# Patient Record
Sex: FEMALE | Race: WHITE | NOT HISPANIC OR LATINO | Employment: OTHER | URBAN - METROPOLITAN AREA
[De-identification: names, ages, dates, MRNs, and addresses within clinical notes are randomized per-mention and may not be internally consistent; named-entity substitution may affect disease eponyms.]

---

## 2017-05-04 ENCOUNTER — TELEPHONE (OUTPATIENT)
Dept: OBSTETRICS AND GYNECOLOGY | Facility: CLINIC | Age: 62
End: 2017-05-04

## 2019-02-20 ENCOUNTER — TRANSCRIBE ORDERS (OUTPATIENT)
Dept: ADMINISTRATIVE | Facility: HOSPITAL | Age: 64
End: 2019-02-20

## 2019-02-20 DIAGNOSIS — Z12.39 BREAST SCREENING: Primary | ICD-10-CM

## 2019-02-25 ENCOUNTER — HOSPITAL ENCOUNTER (OUTPATIENT)
Dept: MAMMOGRAPHY | Facility: HOSPITAL | Age: 64
Discharge: HOME | End: 2019-02-25
Admitting: NURSE PRACTITIONER

## 2019-02-25 DIAGNOSIS — Z12.39 BREAST SCREENING: ICD-10-CM

## 2019-02-25 PROCEDURE — 77067 SCR MAMMO BI INCL CAD: CPT

## 2019-08-22 ENCOUNTER — TRANSCRIBE ORDERS (OUTPATIENT)
Dept: ADMINISTRATIVE | Facility: HOSPITAL | Age: 64
End: 2019-08-22

## 2019-08-22 DIAGNOSIS — R13.10 DYSPHAGIA, UNSPECIFIED TYPE: Primary | ICD-10-CM

## 2019-09-05 ENCOUNTER — HOSPITAL ENCOUNTER (OUTPATIENT)
Dept: GENERAL RADIOLOGY | Facility: HOSPITAL | Age: 64
Discharge: HOME | End: 2019-09-05

## 2019-09-05 ENCOUNTER — HOSPITAL ENCOUNTER (OUTPATIENT)
Dept: GENERAL RADIOLOGY | Facility: HOSPITAL | Age: 64
Discharge: HOME | End: 2019-09-05
Admitting: OTOLARYNGOLOGY

## 2019-09-05 DIAGNOSIS — R13.10 DYSPHAGIA, UNSPECIFIED TYPE: ICD-10-CM

## 2019-09-05 PROCEDURE — 74220 X-RAY XM ESOPHAGUS 1CNTRST: CPT

## 2019-09-05 PROCEDURE — 92611 MOTION FLUOROSCOPY/SWALLOW: CPT

## 2019-09-05 PROCEDURE — 74230 X-RAY XM SWLNG FUNCJ C+: CPT

## 2019-09-05 RX ADMIN — BARIUM SULFATE 50 ML: 400 SUSPENSION ORAL at 10:14

## 2019-09-05 RX ADMIN — BARIUM SULFATE 183 ML: 960 POWDER, FOR SUSPENSION ORAL at 10:13

## 2019-09-05 RX ADMIN — BARIUM SULFATE 135 ML: 980 POWDER, FOR SUSPENSION ORAL at 09:11

## 2019-09-05 RX ADMIN — BARIUM SULFATE 135 ML: 980 POWDER, FOR SUSPENSION ORAL at 10:13

## 2019-09-05 RX ADMIN — ANTACID/ANTIFLATULENT 1 TABLET: 380; 550; 10; 10 GRANULE, EFFERVESCENT ORAL at 09:11

## 2019-09-05 RX ADMIN — BARIUM SULFATE 183 ML: 960 POWDER, FOR SUSPENSION ORAL at 09:11
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
## 2019-10-08 ENCOUNTER — OFFICE VISIT (OUTPATIENT)
Dept: OBSTETRICS AND GYNECOLOGY | Facility: CLINIC | Age: 64
End: 2019-10-08

## 2019-10-08 VITALS
SYSTOLIC BLOOD PRESSURE: 116 MMHG | WEIGHT: 129 LBS | HEIGHT: 59 IN | BODY MASS INDEX: 26 KG/M2 | DIASTOLIC BLOOD PRESSURE: 62 MMHG

## 2019-10-08 DIAGNOSIS — Z00.00 ANNUAL PHYSICAL EXAM: Primary | ICD-10-CM

## 2019-10-08 DIAGNOSIS — N95.2 ATROPHIC VAGINITIS: ICD-10-CM

## 2019-10-08 DIAGNOSIS — N95.1 MENOPAUSE SYNDROME: ICD-10-CM

## 2019-10-08 PROCEDURE — 99386 PREV VISIT NEW AGE 40-64: CPT | Performed by: OBSTETRICS & GYNECOLOGY

## 2019-10-08 PROCEDURE — 99213 OFFICE O/P EST LOW 20 MIN: CPT | Performed by: OBSTETRICS & GYNECOLOGY

## 2019-10-08 RX ORDER — VALSARTAN 40 MG/1
40 TABLET ORAL DAILY
COMMUNITY
Start: 2019-09-23 | End: 2021-07-15

## 2019-10-08 RX ORDER — RIZATRIPTAN BENZOATE 10 MG/1
10 TABLET ORAL ONCE AS NEEDED
COMMUNITY
End: 2021-07-15

## 2019-10-08 RX ORDER — OMEPRAZOLE 20 MG/1
20 CAPSULE, DELAYED RELEASE ORAL DAILY
COMMUNITY
Start: 2019-09-23 | End: 2021-07-15

## 2019-10-08 RX ORDER — HYDROCHLOROTHIAZIDE 12.5 MG/1
12.5 TABLET ORAL DAILY
COMMUNITY
Start: 2019-07-19 | End: 2020-07-18

## 2019-10-08 RX ORDER — HYDROCODONE BITARTRATE AND ACETAMINOPHEN 5; 325 MG/1; MG/1
1 TABLET ORAL EVERY 6 HOURS PRN
COMMUNITY
End: 2021-05-06 | Stop reason: SDUPTHER

## 2019-10-08 NOTE — PROGRESS NOTES
HPI   Domitila Paiz  is a 63 y.o. female who presents for several reasons.  First, it is been many years since she had a gynecologic exam.  She would like to reestablish care.  She reports normal mammogram earlier this year.  Colonoscopy is planned for November.  Second, she has hot flashes and night sweats.  She used to take Estrace pills.  She stopped them approximately 11 months ago.  Since then, her hot flashes and night sweats have returned.  Third, she has dyspareunia due to vaginal dryness.  She reports pain and dryness with difficulty on penetration.  This has not responded to the use of a personal lubricant.    Chief Complaint   Patient presents with   • New Gyn     Patient is here for a new gyn annual.       Past Medical History:   Diagnosis Date   • Hypertension        Past Surgical History:   Procedure Laterality Date   • HYSTERECTOMY         Social History     Socioeconomic History   • Marital status:      Spouse name: Not on file   • Number of children: Not on file   • Years of education: Not on file   • Highest education level: Not on file   Tobacco Use   • Smoking status: Never Smoker   Substance and Sexual Activity   • Alcohol use: No     Frequency: Never   • Drug use: No   • Sexual activity: Yes     Partners: Male       The following portions of the patient's history were reviewed and updated as appropriate: allergies, current medications, past family history, past medical history, past social history, past surgical history and problem list.    Review of Systems this is positive for hot flashes and night sweats.  It is positive for vaginal dryness.  It is positive for dyspareunia.  It is negative for vaginal bleeding.  It is negative for fever or chills.  It is negative for any spleen weight change.  It is negative for nausea or vomiting.  All other systems are reviewed and are negative          Physical Exam   Constitutional: She is oriented to person, place, and time. She appears  well-developed and well-nourished.   HENT:   Head: Normocephalic and atraumatic.   Cardiovascular: Normal rate and regular rhythm.   Pulmonary/Chest: Effort normal and breath sounds normal. She has no wheezes. She has no rales.   The breasts are homogeneous.  There are no palpable lumps.  Nipple discharge and axillary adenopathy are absent.   Abdominal: Soft. She exhibits no distension. There is no tenderness.   Genitourinary: There is no lesion on the right labia. There is no lesion on the left labia. Right adnexum displays no mass. No vaginal discharge found.   Genitourinary Comments: The vagina is atrophic but without lesion  No pelvic masses are palpable   Neurological: She is alert and oriented to person, place, and time.   Skin: Skin is warm and dry.   Nursing note and vitals reviewed.      Assessment    Domitila was seen today for new gyn.    Diagnoses and all orders for this visit:    Annual physical exam    Menopause syndrome    Atrophic vaginitis    Other orders  -     conjugated estrogens (PREMARIN) 0.625 MG/GM vaginal cream; 0.5 grams vaginally twice weekly        Plan  1. Normal gynecologic exam  2. The patient reports a recent normal mammogram.  Counseled regarding the importance of regular screening mammograms.  3. Colonoscopy is planned for next month.  4. Menopausal symptoms.  Counseled regarding options for management.  The benefits, risks and alternatives to using systemic hormone replacement were discussed.  Transdermal hormone replacement was recommended.  I answered the patient's questions and reviewed data from the [facility].  After consideration, the patient declined systemic hormone replacement  5. Dyspareunia and atrophic vaginitis.  Counseled.  We discussed the benefits and risks of using vaginal estrogen.  The patient would like to try this.  I answered her questions and discussed proper use.  A prescription for Premarin cream has been sent.  6. 15 minutes of direct face-to-face counseling were  undertaken to discuss issues #4 and 5 listed above.    7. Return in about 1 year (around 10/8/2020).    Social History     Tobacco Use   Smoking Status Never Smoker   8.     9.

## 2019-12-03 ENCOUNTER — TRANSCRIBE ORDERS (OUTPATIENT)
Dept: ADMINISTRATIVE | Facility: HOSPITAL | Age: 64
End: 2019-12-03

## 2019-12-03 DIAGNOSIS — R22.1 MASS OF RIGHT SIDE OF NECK: Primary | ICD-10-CM

## 2019-12-05 ENCOUNTER — APPOINTMENT (OUTPATIENT)
Dept: CT IMAGING | Facility: HOSPITAL | Age: 64
End: 2019-12-05

## 2020-01-06 ENCOUNTER — HOSPITAL ENCOUNTER (OUTPATIENT)
Dept: CT IMAGING | Facility: HOSPITAL | Age: 65
Discharge: HOME | End: 2020-01-06
Admitting: OTOLARYNGOLOGY

## 2020-01-06 DIAGNOSIS — R22.1 MASS OF RIGHT SIDE OF NECK: ICD-10-CM

## 2020-01-06 LAB — CREAT BLDA-MCNC: 0.8 MG/DL (ref 0.6–1.3)

## 2020-01-06 PROCEDURE — 82565 ASSAY OF CREATININE: CPT

## 2020-01-06 PROCEDURE — 25010000002 IOPAMIDOL 61 % SOLUTION: Performed by: OTOLARYNGOLOGY

## 2020-01-06 PROCEDURE — 70491 CT SOFT TISSUE NECK W/DYE: CPT

## 2020-01-06 RX ADMIN — IOPAMIDOL 75 ML: 612 INJECTION, SOLUTION INTRAVENOUS at 08:45

## 2020-02-04 ENCOUNTER — OFFICE VISIT (OUTPATIENT)
Dept: GASTROENTEROLOGY | Facility: CLINIC | Age: 65
End: 2020-02-04

## 2020-02-04 VITALS
SYSTOLIC BLOOD PRESSURE: 122 MMHG | HEIGHT: 59 IN | WEIGHT: 129.6 LBS | BODY MASS INDEX: 26.13 KG/M2 | TEMPERATURE: 98.5 F | DIASTOLIC BLOOD PRESSURE: 72 MMHG

## 2020-02-04 DIAGNOSIS — R13.10 DYSPHAGIA, UNSPECIFIED TYPE: Primary | ICD-10-CM

## 2020-02-04 DIAGNOSIS — K21.9 GASTROESOPHAGEAL REFLUX DISEASE, ESOPHAGITIS PRESENCE NOT SPECIFIED: ICD-10-CM

## 2020-02-04 PROCEDURE — 99204 OFFICE O/P NEW MOD 45 MIN: CPT | Performed by: INTERNAL MEDICINE

## 2020-02-04 RX ORDER — UBIDECARENONE 75 MG
50 CAPSULE ORAL DAILY
COMMUNITY
End: 2020-11-11

## 2020-02-04 RX ORDER — SODIUM CHLORIDE, SODIUM LACTATE, POTASSIUM CHLORIDE, CALCIUM CHLORIDE 600; 310; 30; 20 MG/100ML; MG/100ML; MG/100ML; MG/100ML
30 INJECTION, SOLUTION INTRAVENOUS CONTINUOUS
Status: CANCELLED | OUTPATIENT
Start: 2020-02-04
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
## 2020-02-24 ENCOUNTER — OUTSIDE FACILITY SERVICE (OUTPATIENT)
Dept: GASTROENTEROLOGY | Facility: CLINIC | Age: 65
End: 2020-02-24

## 2020-02-24 PROCEDURE — 43239 EGD BIOPSY SINGLE/MULTIPLE: CPT | Performed by: INTERNAL MEDICINE

## 2020-03-05 ENCOUNTER — TELEPHONE (OUTPATIENT)
Dept: GASTROENTEROLOGY | Facility: CLINIC | Age: 65
End: 2020-03-05

## 2020-03-05 NOTE — TELEPHONE ENCOUNTER
----- Message from Jarrell PENG MD sent at 3/2/2020  5:39 PM EST -----  Regarding: Biopsy results  Okay to call results, recommend continue/switch PPI for management of Santillan's esophagus.  Would offer follow-up EGD in 2 years time.  If swallowing issues persist can consider video fluoroscopy swallow study as well as esophageal manometry to better define.  ----- Message -----  From: Interface, Scans Incoming  Sent: 3/2/2020   1:46 PM EST  To: Jarrell PENG MD

## 2020-03-05 NOTE — TELEPHONE ENCOUNTER
I would explained to the patient that findings in the duodenum were nonspecific but may be associated with underlying medical conditions although she does not have any the clinical manifestations of those conditions.  I would be happy to discuss this with her at length if she wishes to follow-up and do so.

## 2020-03-20 NOTE — TELEPHONE ENCOUNTER
As per our conversation, would advise patient sleep on a wedge (on her stomach if she wishes) and also to follow antireflux measures such as not eating late and avoiding foods that potentiate reflux.  Manometry would be for testing esophageal function including lower esophageal sphincter pressure to see if that predisposes her to reflux.  I would advise the patient follow-up with myself or the nurse practitioner to discuss these issues at length if she so desires.

## 2020-03-20 NOTE — TELEPHONE ENCOUNTER
"Call to pt.  Advise per path report that mild duodenal lymphocytosis.  Stomach body with gastritis.  GE junction with evidence of Santillan's.    Advise per Dr Franco notes.      Verb understanding.   has had swallow study - see media tab 8/22/19.    States sleeps on her stomach, but if she turns onto her back \"even for just a minute I have terrible choking sensation\".  Does not have this issue if recliner.  Advise elevate HOB, or use pillows.  cannot do this because usually sleeps on stomach.   would like to pursue further testing, but wants to understand better what manometry would show.    Questions to Dr Franco.   "

## 2020-03-23 NOTE — TELEPHONE ENCOUNTER
Call to pt.  Advise per DR Franco note.  Verb understanding.  States will try reflux measures at this time - f/u in future if needed.

## 2020-11-06 ENCOUNTER — APPOINTMENT (OUTPATIENT)
Dept: CARDIOLOGY | Facility: HOSPITAL | Age: 65
End: 2020-11-06

## 2020-11-06 ENCOUNTER — HOSPITAL ENCOUNTER (EMERGENCY)
Facility: HOSPITAL | Age: 65
Discharge: HOME | End: 2020-11-06
Attending: EMERGENCY MEDICINE | Admitting: EMERGENCY MEDICINE

## 2020-11-06 ENCOUNTER — APPOINTMENT (OUTPATIENT)
Dept: GENERAL RADIOLOGY | Facility: HOSPITAL | Age: 65
End: 2020-11-06

## 2020-11-06 VITALS
HEART RATE: 74 BPM | HEIGHT: 59 IN | DIASTOLIC BLOOD PRESSURE: 85 MMHG | SYSTOLIC BLOOD PRESSURE: 139 MMHG | BODY MASS INDEX: 26.18 KG/M2 | OXYGEN SATURATION: 98 % | RESPIRATION RATE: 16 BRPM | TEMPERATURE: 98.5 F

## 2020-11-06 DIAGNOSIS — R06.02 SHORTNESS OF BREATH: ICD-10-CM

## 2020-11-06 DIAGNOSIS — M79.605 LOWER EXTREMITY PAIN, LEFT: Primary | ICD-10-CM

## 2020-11-06 LAB
ALBUMIN SERPL-MCNC: 4.3 G/DL (ref 3.5–5.2)
ALBUMIN/GLOB SERPL: 1.4 G/DL
ALP SERPL-CCNC: 118 U/L (ref 39–117)
ALT SERPL W P-5'-P-CCNC: 31 U/L (ref 1–33)
ANION GAP SERPL CALCULATED.3IONS-SCNC: 11 MMOL/L (ref 5–15)
AST SERPL-CCNC: 43 U/L (ref 1–32)
BASOPHILS # BLD AUTO: 0.07 10*3/MM3 (ref 0–0.2)
BASOPHILS NFR BLD AUTO: 0.7 % (ref 0–1.5)
BH CV LOWER VASCULAR LEFT COMMON FEMORAL AUGMENT: NORMAL
BH CV LOWER VASCULAR LEFT COMMON FEMORAL COMPETENT: NORMAL
BH CV LOWER VASCULAR LEFT COMMON FEMORAL COMPRESS: NORMAL
BH CV LOWER VASCULAR LEFT COMMON FEMORAL PHASIC: NORMAL
BH CV LOWER VASCULAR LEFT COMMON FEMORAL SPONT: NORMAL
BH CV LOWER VASCULAR LEFT DISTAL FEMORAL COMPRESS: NORMAL
BH CV LOWER VASCULAR LEFT GASTRONEMIUS COMPRESS: NORMAL
BH CV LOWER VASCULAR LEFT GREATER SAPH AK COMPRESS: NORMAL
BH CV LOWER VASCULAR LEFT GREATER SAPH BK COMPRESS: NORMAL
BH CV LOWER VASCULAR LEFT LESSER SAPH COMPRESS: NORMAL
BH CV LOWER VASCULAR LEFT MID FEMORAL AUGMENT: NORMAL
BH CV LOWER VASCULAR LEFT MID FEMORAL COMPETENT: NORMAL
BH CV LOWER VASCULAR LEFT MID FEMORAL COMPRESS: NORMAL
BH CV LOWER VASCULAR LEFT MID FEMORAL PHASIC: NORMAL
BH CV LOWER VASCULAR LEFT MID FEMORAL SPONT: NORMAL
BH CV LOWER VASCULAR LEFT PERONEAL COMPRESS: NORMAL
BH CV LOWER VASCULAR LEFT POPLITEAL AUGMENT: NORMAL
BH CV LOWER VASCULAR LEFT POPLITEAL COMPETENT: NORMAL
BH CV LOWER VASCULAR LEFT POPLITEAL COMPRESS: NORMAL
BH CV LOWER VASCULAR LEFT POPLITEAL PHASIC: NORMAL
BH CV LOWER VASCULAR LEFT POPLITEAL SPONT: NORMAL
BH CV LOWER VASCULAR LEFT POSTERIOR TIBIAL COMPRESS: NORMAL
BH CV LOWER VASCULAR LEFT PROFUNDA FEMORAL COMPRESS: NORMAL
BH CV LOWER VASCULAR LEFT PROXIMAL FEMORAL COMPRESS: NORMAL
BH CV LOWER VASCULAR LEFT SAPHENOFEMORAL JUNCTION COMPRESS: NORMAL
BH CV LOWER VASCULAR RIGHT COMMON FEMORAL AUGMENT: NORMAL
BH CV LOWER VASCULAR RIGHT COMMON FEMORAL COMPETENT: NORMAL
BH CV LOWER VASCULAR RIGHT COMMON FEMORAL COMPRESS: NORMAL
BH CV LOWER VASCULAR RIGHT COMMON FEMORAL PHASIC: NORMAL
BH CV LOWER VASCULAR RIGHT COMMON FEMORAL SPONT: NORMAL
BH CV POP FLUID COLLECT LEFT: 1
BILIRUB SERPL-MCNC: 0.2 MG/DL (ref 0–1.2)
BUN SERPL-MCNC: 14 MG/DL (ref 8–23)
BUN/CREAT SERPL: 18.7 (ref 7–25)
CALCIUM SPEC-SCNC: 9.2 MG/DL (ref 8.6–10.5)
CHLORIDE SERPL-SCNC: 101 MMOL/L (ref 98–107)
CO2 SERPL-SCNC: 25 MMOL/L (ref 22–29)
CREAT SERPL-MCNC: 0.75 MG/DL (ref 0.57–1)
D DIMER PPP FEU-MCNC: 0.66 MCGFEU/ML (ref 0–0.49)
DEPRECATED RDW RBC AUTO: 41 FL (ref 37–54)
EOSINOPHIL # BLD AUTO: 0.16 10*3/MM3 (ref 0–0.4)
EOSINOPHIL NFR BLD AUTO: 1.6 % (ref 0.3–6.2)
ERYTHROCYTE [DISTWIDTH] IN BLOOD BY AUTOMATED COUNT: 14.1 % (ref 12.3–15.4)
GFR SERPL CREATININE-BSD FRML MDRD: 78 ML/MIN/1.73
GLOBULIN UR ELPH-MCNC: 3 GM/DL
GLUCOSE SERPL-MCNC: 94 MG/DL (ref 65–99)
HCT VFR BLD AUTO: 33.7 % (ref 34–46.6)
HGB BLD-MCNC: 10.9 G/DL (ref 12–15.9)
HOLD SPECIMEN: NORMAL
HOLD SPECIMEN: NORMAL
IMM GRANULOCYTES # BLD AUTO: 0.03 10*3/MM3 (ref 0–0.05)
IMM GRANULOCYTES NFR BLD AUTO: 0.3 % (ref 0–0.5)
LYMPHOCYTES # BLD AUTO: 2.44 10*3/MM3 (ref 0.7–3.1)
LYMPHOCYTES NFR BLD AUTO: 25 % (ref 19.6–45.3)
MCH RBC QN AUTO: 26.2 PG (ref 26.6–33)
MCHC RBC AUTO-ENTMCNC: 32.3 G/DL (ref 31.5–35.7)
MCV RBC AUTO: 81 FL (ref 79–97)
MONOCYTES # BLD AUTO: 1.1 10*3/MM3 (ref 0.1–0.9)
MONOCYTES NFR BLD AUTO: 11.3 % (ref 5–12)
NEUTROPHILS NFR BLD AUTO: 5.96 10*3/MM3 (ref 1.7–7)
NEUTROPHILS NFR BLD AUTO: 61.1 % (ref 42.7–76)
NRBC BLD AUTO-RTO: 0 /100 WBC (ref 0–0.2)
PLATELET # BLD AUTO: 306 10*3/MM3 (ref 140–450)
PMV BLD AUTO: 9.6 FL (ref 6–12)
POTASSIUM SERPL-SCNC: 4.4 MMOL/L (ref 3.5–5.2)
PROT SERPL-MCNC: 7.3 G/DL (ref 6–8.5)
QT INTERVAL: 359 MS
RBC # BLD AUTO: 4.16 10*6/MM3 (ref 3.77–5.28)
SODIUM SERPL-SCNC: 137 MMOL/L (ref 136–145)
TROPONIN T SERPL-MCNC: <0.01 NG/ML (ref 0–0.03)
WBC # BLD AUTO: 9.76 10*3/MM3 (ref 3.4–10.8)
WHOLE BLOOD HOLD SPECIMEN: NORMAL
WHOLE BLOOD HOLD SPECIMEN: NORMAL

## 2020-11-06 PROCEDURE — 25010000003 MEPERIDINE PER 100 MG: Performed by: EMERGENCY MEDICINE

## 2020-11-06 PROCEDURE — 93971 EXTREMITY STUDY: CPT

## 2020-11-06 PROCEDURE — 96374 THER/PROPH/DIAG INJ IV PUSH: CPT

## 2020-11-06 PROCEDURE — 84484 ASSAY OF TROPONIN QUANT: CPT | Performed by: EMERGENCY MEDICINE

## 2020-11-06 PROCEDURE — 36415 COLL VENOUS BLD VENIPUNCTURE: CPT

## 2020-11-06 PROCEDURE — 85025 COMPLETE CBC W/AUTO DIFF WBC: CPT | Performed by: EMERGENCY MEDICINE

## 2020-11-06 PROCEDURE — 93010 ELECTROCARDIOGRAM REPORT: CPT | Performed by: INTERNAL MEDICINE

## 2020-11-06 PROCEDURE — 71045 X-RAY EXAM CHEST 1 VIEW: CPT

## 2020-11-06 PROCEDURE — 93005 ELECTROCARDIOGRAM TRACING: CPT | Performed by: EMERGENCY MEDICINE

## 2020-11-06 PROCEDURE — 80053 COMPREHEN METABOLIC PANEL: CPT | Performed by: EMERGENCY MEDICINE

## 2020-11-06 PROCEDURE — 99284 EMERGENCY DEPT VISIT MOD MDM: CPT

## 2020-11-06 PROCEDURE — 85379 FIBRIN DEGRADATION QUANT: CPT | Performed by: EMERGENCY MEDICINE

## 2020-11-06 RX ORDER — MEPERIDINE HYDROCHLORIDE 25 MG/ML
50 INJECTION INTRAMUSCULAR; INTRAVENOUS; SUBCUTANEOUS ONCE
Status: COMPLETED | OUTPATIENT
Start: 2020-11-06 | End: 2020-11-06

## 2020-11-06 RX ORDER — SODIUM CHLORIDE 0.9 % (FLUSH) 0.9 %
10 SYRINGE (ML) INJECTION AS NEEDED
Status: DISCONTINUED | OUTPATIENT
Start: 2020-11-06 | End: 2020-11-06 | Stop reason: HOSPADM

## 2020-11-06 RX ORDER — ASPIRIN 81 MG/1
324 TABLET, CHEWABLE ORAL ONCE
Status: DISCONTINUED | OUTPATIENT
Start: 2020-11-06 | End: 2020-11-06 | Stop reason: HOSPADM

## 2020-11-06 RX ADMIN — MEPERIDINE HYDROCHLORIDE 50 MG: 25 INJECTION INTRAMUSCULAR; INTRAVENOUS; SUBCUTANEOUS at 13:20
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
## 2020-11-06 NOTE — DISCHARGE INSTRUCTIONS
Your Doppler ultrasound does not show evidence of blood clots.  Your lab testing does not show evidence of heart attack or other serious cardiac problems.  Chest x-ray was found to be free of pneumonia or abnormal fluid collections.  Please take Tylenol and/or Motrin to help with pain of the left lower extremity.  I would recommend minimizing movement and exertion of the left leg until your symptoms have improved.

## 2020-11-06 NOTE — ED TRIAGE NOTES
Pt arrives via EMS from [facility]. Pt had cardiac stents placed on 11/3/20 at [facility] and was discharged 2 days ago. Pt reports that last night she developed L calf pain and this morning started to experience CP and SOA. Pt has had 2 nitro and 324 mg ASA PTA with some relief. Pt requested to come to [facility] [place] instead of [facility]. Pt masked at arrival and triage staff wore all appropriate PPE.

## 2020-11-11 ENCOUNTER — OFFICE VISIT (OUTPATIENT)
Dept: CARDIOLOGY | Facility: CLINIC | Age: 65
End: 2020-11-11

## 2020-11-11 VITALS
DIASTOLIC BLOOD PRESSURE: 100 MMHG | BODY MASS INDEX: 25.48 KG/M2 | HEART RATE: 84 BPM | SYSTOLIC BLOOD PRESSURE: 162 MMHG | WEIGHT: 126.4 LBS | HEIGHT: 59 IN

## 2020-11-11 DIAGNOSIS — E78.2 MIXED HYPERLIPIDEMIA: ICD-10-CM

## 2020-11-11 DIAGNOSIS — I10 ESSENTIAL HYPERTENSION: ICD-10-CM

## 2020-11-11 DIAGNOSIS — I25.118 CORONARY ARTERY DISEASE OF NATIVE ARTERY OF NATIVE HEART WITH STABLE ANGINA PECTORIS (HCC): Primary | ICD-10-CM

## 2020-11-11 PROCEDURE — 99204 OFFICE O/P NEW MOD 45 MIN: CPT | Performed by: INTERNAL MEDICINE

## 2020-11-11 PROCEDURE — 93000 ELECTROCARDIOGRAM COMPLETE: CPT | Performed by: INTERNAL MEDICINE

## 2020-11-11 RX ORDER — DOXYCYCLINE 100 MG/1
100 TABLET ORAL AS NEEDED
COMMUNITY
Start: 2020-07-14 | End: 2021-07-15

## 2020-11-11 RX ORDER — EZETIMIBE 10 MG/1
10 TABLET ORAL DAILY
COMMUNITY
Start: 2020-10-31 | End: 2021-06-03

## 2020-11-11 RX ORDER — CARVEDILOL 6.25 MG/1
6.25 TABLET ORAL 2 TIMES DAILY WITH MEALS
Qty: 180 TABLET | Refills: 3 | Status: SHIPPED | OUTPATIENT
Start: 2020-11-11 | End: 2020-11-20 | Stop reason: SDUPTHER

## 2020-11-11 RX ORDER — ASPIRIN 81 MG/1
81 TABLET ORAL DAILY
COMMUNITY
Start: 2020-10-19 | End: 2021-04-28

## 2020-11-11 RX ORDER — NITROGLYCERIN 0.4 MG/1
0.4 TABLET SUBLINGUAL
COMMUNITY
Start: 2020-10-31

## 2020-11-11 RX ORDER — PRASUGREL 10 MG/1
10 TABLET, FILM COATED ORAL DAILY
COMMUNITY
Start: 2020-10-31 | End: 2021-04-28

## 2020-11-11 RX ORDER — ROSUVASTATIN CALCIUM 20 MG/1
20 TABLET, COATED ORAL DAILY
COMMUNITY
Start: 2020-10-19 | End: 2021-06-03

## 2020-11-11 RX ORDER — METOPROLOL SUCCINATE 25 MG/1
25 TABLET, EXTENDED RELEASE ORAL DAILY
COMMUNITY
Start: 2020-10-20 | End: 2020-11-11

## 2020-11-11 RX ORDER — ALBUTEROL SULFATE 90 UG/1
2 AEROSOL, METERED RESPIRATORY (INHALATION)
COMMUNITY
End: 2021-07-15

## 2020-11-11 RX ORDER — MULTIPLE VITAMINS W/ MINERALS TAB 9MG-400MCG
1 TAB ORAL DAILY
COMMUNITY
End: 2021-07-15

## 2020-11-11 NOTE — PROGRESS NOTES
[facility] New Patient Office Note     Encounter Date:20  Patient:Domitila Paiz  :1955  MRN:[id number]    Referring Provider: Self    Consulted for: Coronary artery disease    Chief Complaint:   Chief Complaint   Patient presents with   • Chest Pain   • Shortness of Breath     History of Presenting Illness:      Ms. Paiz is a 64 y.o. woman past medical history notable for coronary artery disease status post percutaneous intervention, hypertension, mixed hyperlipidemia, and chronic back pain related orthopedic issues status post surgeries who presents to our office for additional evaluation regarding her recent diagnosis of coronary artery disease with recent stenting.      Patient's cardiac care thus far has been at [facility] she initially underwent a cardiac CTA for further evaluation of her chest pain last month which demonstrated LAD stenosis.  She did undergo a cardiac catheterization on 10/30/2020 where she had a stent placed to the proximal LAD.  Unfortunately, she started have more chest pain after the procedure and the following day she had a repeat cardiac catheterization which demonstrated stable coronary findings.  She did have concerns for access issues from her right groin and had an ultrasound which was normal.  She continued to have chest pain and thus represented back to [facility] ED on 11/3 and was noted to have elevated troponins and continued chest pain and had a repeat cardiac catheterization where her prior moderate 50% lesion in the mid LAD had seem to progress to 75% and was reported as hazy.  She had a second stent placed at that time.      Unfortunately she continues to have chest discomfort.  She states that she continues to have significant dyspnea on exertion as well as chest pressure.  For the most part this is been actually worse since her initial presentation for an elective cardiac catheterization.  With regards to her symptoms they are constant  throughout most the day.  There is minimal exertional component to them.  She does state that it does seem worse if her blood pressure gets elevated.    With regards to her current medical regimen metoprolol is a new medicine for her was added after her recent stenting.  She is also been on dual antiplatelet therapy with aspirin and Effient.  She denies any bleeding issues while on dual antiplatelet therapy.  She has had bruising but relates this to being held down during her last cardiac catheterization.    Due to continued symptoms as well as concerns for possible leg swelling and bruising in her legs she did present to our emergency room on 11/6/2020 where she was ruled out for DVT with lower extremity Dopplers which were normal.  Given the issues that she had at [facility] she wanted to seek out a second opinion and was referred to us from the ED.      Review of Systems:  Review of Systems   Constitution: Positive for malaise/fatigue.   HENT: Negative.    Eyes: Negative.    Cardiovascular: Positive for chest pain, dyspnea on exertion and leg swelling.   Respiratory: Positive for shortness of breath.    Endocrine: Negative.    Hematologic/Lymphatic: Negative.    Skin: Negative.    Musculoskeletal: Negative.    Gastrointestinal: Negative.    Genitourinary: Negative.    Neurological: Negative.    Psychiatric/Behavioral: Negative.    Allergic/Immunologic: Negative.        Prior to Admission medications    Medication Sig Start Date End Date Taking? Authorizing Provider   albuterol sulfate  (90 Base) MCG/ACT inhaler Inhale 2 puffs.   Yes ProviderBarnden MD   aspirin 81 MG EC tablet Take 81 mg by mouth Daily. 10/19/20  Yes Branden Britton MD   doxycycline (ADOXA) 100 MG tablet Take 100 mg by mouth Daily. 7/14/20  Yes Branden Britton MD   ezetimibe (ZETIA) 10 MG tablet Take 10 mg by mouth Daily. 10/31/20  Yes ProviderBranden MD   HYDROcodone-acetaminophen (NORCO) 5-325 MG per tablet  Take 1 tablet by mouth Every 6 (Six) Hours As Needed.   Yes Branden Britton MD   multivitamin with minerals (MULTIPLE VITAMINS-MINERALS PO) Take 1 tablet by mouth Daily.   Yes Branden Britton MD   nitroglycerin (NITROSTAT) 0.4 MG SL tablet Place 0.4 mg under the tongue. 10/31/20  Yes Branden Britton MD   omeprazole (priLOSEC) 20 MG capsule Take 20 mg by mouth Daily. 9/23/19  Yes Branden Britton MD   prasugrel (EFFIENT) 10 MG tablet Take 10 mg by mouth Daily. 10/31/20  Yes Branden Britton MD   rizatriptan (MAXALT) 10 MG tablet Take 10 mg by mouth 1 (One) Time As Needed for Migraine. May repeat in 2 hours if needed   Yes Branden Britton MD   rosuvastatin (CRESTOR) 20 MG tablet Take 20 mg by mouth Daily. 10/19/20  Yes Branden Britton MD   valsartan (DIOVAN) 40 MG tablet Take 40 mg by mouth Daily. 9/23/19  Yes Branden Britton MD   metoprolol succinate XL (TOPROL-XL) 25 MG 24 hr tablet Take 25 mg by mouth Daily. 10/20/20 11/11/20 Yes Branden Britton MD   carvedilol (COREG) 6.25 MG tablet Take 1 tablet by mouth 2 (Two) Times a Day With Meals. 11/11/20   Carlos A Vasques MD   conjugated estrogens (PREMARIN) 0.625 MG/GM vaginal cream 0.5 grams vaginally twice weekly 10/8/19 11/11/20  Allan Hannon MD   vitamin B-12 (CYANOCOBALAMIN) 100 MCG tablet Take 50 mcg by mouth Daily.  11/11/20  ProviderBranden MD       Allergies   Allergen Reactions   • Wasp Venom Anaphylaxis   • Morphine Other (See Comments) and Hives     Pt states she almost od'd in the hosp on this years ago   • Poison Ivy Extract Hives   • Butorphanol Itching, Rash and Hives       Past Medical History:   Diagnosis Date   • Hypertension        Past Surgical History:   Procedure Laterality Date   • CARDIAC SURGERY     • COLONOSCOPY  2012    Dr. Lam   • HYSTERECTOMY         Social History     Socioeconomic History   • Marital status:      Spouse name: Not on file   • Number of children: Not on  "file   • Years of education: Not on file   • Highest education level: Not on file   Tobacco Use   • Smoking status: Never Smoker   • Smokeless tobacco: Never Used   Substance and Sexual Activity   • Alcohol use: No     Frequency: Never     Comment: caffeine use    • Drug use: No   • Sexual activity: Yes     Partners: Male       Family History   Problem Relation Age of Onset   • Hypertension Mother        The following portions of the patient's history were reviewed and updated as appropriate: allergies, current medications, past family history, past medical history, past social history, past surgical history and problem list.       Objective:       Vitals:    11/11/20 1046   BP: 162/100   BP Location: Left arm   Patient Position: Sitting   Pulse: 84   Weight: 57.3 kg (126 lb 6.4 oz)   Height: 149.9 cm (59\")       Physical Exam:  Constitutional: Well appearing, well developed, no acute distress   HENT: Oropharynx clear and membrane moist  Eyes: Normal conjunctiva, no sclera icterus.  Neck: Supple, no carotid bruit bilaterally.  Cardiovascular: Regular rate and rhythm, No Murmur, No bilateral lower extremity edema.  Pulmonary: Normal respiratory effort, normal lung sounds, no wheezing.  Abdominal: Soft, nontender, no hepatosplenomegaly, liver is non-pulsatile.  Neurological: Alert and orient x 3.   Skin: Warm, dry, no ecchymosis, no rash.  Psych: Appropriate mood and affect. Normal judgment and insight.      Lab Results   Component Value Date    GLUCOSE 94 11/06/2020    BUN 14 11/06/2020    CREATININE 0.75 11/06/2020    EGFRIFNONA 78 11/06/2020    BCR 18.7 11/06/2020    K 4.4 11/06/2020    CO2 25.0 11/06/2020    CALCIUM 9.2 11/06/2020    ALBUMIN 4.30 11/06/2020    LABIL2 1.3 03/14/2019    AST 43 (H) 11/06/2020    ALT 31 11/06/2020       Lab Results   Component Value Date    WBC 9.76 11/06/2020    HGB 10.9 (L) 11/06/2020    HCT 33.7 (L) 11/06/2020    MCV 81.0 11/06/2020     11/06/2020       Lab Results "   Component Value Date    TROPONINI 0.092 (H) 11/02/2020    TROPONINT <0.010 11/06/2020       Lab Results   Component Value Date    CHLPL 214 (H) 03/14/2019    CHLPL 208 (H) 07/13/2018    CHLPL 200 (H) 06/20/2018     Lab Results   Component Value Date    TRIG 60 03/14/2019    TRIG 87 07/13/2018    TRIG 56 06/20/2018     Lab Results   Component Value Date    HDL 69 03/14/2019    HDL 65 07/13/2018    HDL 67 06/20/2018     Lab Results   Component Value Date     (H) 03/14/2019     (H) 07/13/2018     (H) 06/20/2018       Lab Results   Component Value Date    TSH 0.957 03/14/2019         ECG 12 Lead    Date/Time: 11/11/2020 2:28 PM  Performed by: Carlos A Vasques MD  Authorized by: Carlos A Vasques MD   Comparison: compared with previous ECG from 11/6/2020  Similar to previous ECG  Rhythm: sinus rhythm  Conduction: 1st degree AV block    Clinical impression: non-specific ECG        Cardiac catheterization 11/3/2020:  · Left main: This gives rise to the LAD and left circumflex. The left main is free of disease.  · LAD: This gives rise to a single diagonal fairly distally. It terminates at the apex. The stent in the proximal vessel is widely patent. Just prior to the diagonal branch there is a focal somewhat hazy 75% stenosis focally  · LCx: This gives rise to 2 moderate marginal is a small terminal marginal. The left circumflex is free of disease.  · RCA: This is dominant giving rise to the PDA and a posterolateral left ventricular branch both of which are large. These extend out to the ape with up to 20% narrowing in the ostium of the PDA x. The right coronary has luminal irregularity  · Successful percutaneous intervention to mid LAD with placement of 2.25 x 12 mm resolute Kt drug-eluting stent    Cardiac catheterization 10/30/2020:  · No change from cardiac catheterization earlier in the day    Cardiac catheterization 10/30/2020:  · Left Main: The left main is a large caliber vessel with a  normal take off from the left coronary cusp that bifurcates to form a left anterior descending artery and a left circumflex artery. There is no angiographically significant coronary artery disease noted.  · Left anterior descending artery:The LAD is a medium caliber vessel. There is a non-calcified underfilled segment proximally followed by sequential calcified lesions 50-75% with a focal mid >75% lesion. There is a focal mid calcified 50% stenosis. There is a small caliber high takeoff diagonal and a moderate sized mid/distal takeoff diagonal 2 with no significant disease  · Left circumflex artery: The circumflex is a medium caliber, non-dominant vessel. There is a mid trifurcation where OM1 and OM2 takeoff from a small caliber AV groove branch. There is a motion artifact mid OM1/2 that is uninterpretable, these are moderate caliber vessels with no significant disease  · Right coronary artery: The RCA is a large caliber, dominant vessel. It has a normal takeoff from the right coronary cusp. The RCA terminates as a PDA and right posterolateral branch. There are focal <25% calcified stenoses in the mid RCA  · Left Ventricle: The ventricular cavity size is within normal limits. There are no stigmata of prior infarction. There is no abnormal filling defect. LVEF is estimated at 55%.  · Successful implantation of a 2.5 x18 mm resolute Kt drug-eluting stent to proximal LAD stenoses    Cardiac CTA/16/2020:  · Total calcium score 125.   · Normal coronary origins and courses.    · Severe proximal LAD disease with sequential lesions, maximum >75%        Assessment:          Diagnosis Plan   1. Coronary artery disease of native artery of native heart with stable angina pectoris (CMS/[facility])  Ambulatory Referral to Cardiac Rehab    ECG 12 Lead   2. Mixed hyperlipidemia     3. Essential hypertension            Plan:       Ms. Paiz is a 64 y.o. woman past medical history notable for coronary artery disease status post  percutaneous intervention, hypertension, mixed hyperlipidemia, and chronic back pain related orthopedic issues status post surgeries who presents to our office for additional evaluation regarding her recent cardiac stenting and chest pain. Her symptoms are difficult to define as she has consistently had chest discomfort and actually has felt worse ever since her initial cardiac catheterization.  At least from a lab and EKG standpoint when she came to our emergency room last week her EKG had normalized and her troponins were normal.  This is reassuring and for this reason would like to try and make some adjustment in her medical regimen as she has now had three cardiac catheterizations over the last two weeks.      I would like to change her from metoprolol to carvedilol as she has had elevated blood pressures and hopefully with optimizing her blood pressure and heart rate her symptoms will improve.  We can also consider adding Imdur or amlodipine pending her response to her beta blocker change.  In the meantime I would also like to get the actual films of her recent cardaic catheterization to better understand her recent stenting and look for other etiologies for pain such as spasm or microvascular disease.     Coronary artery disease with stable angina:  · Will continue dual antiplatelet therapy  · We will change metoprolol to carvedilol and monitor response  · Continue high potency statin  · We will consider either adding Imdur or amlodipine to medical regimen if symptoms continue  · We will get CDs of her recent cardiac catheterizations to better understand if there is alternative etiologies for her pain such as coronary artery spasm microvascular disease    Hypertension:  · We will see her response to change from metoprolol to carvedilol  · Continue valsartan  · Consider adding amlodipine if blood pressure remains elevated with good heart rate control    Mixed Hyperlipidemia:  · Continue statin    Follow Up:  4  Weeks      Thank you for allowing me to participate in the care of Domitila MCGUIRE Izabel. Feel free to contact me directly with any further questions or concerns.    Carlos A Vasques MD  [facility]  11/11/20  14:57 EST

## 2020-11-17 ENCOUNTER — TELEPHONE (OUTPATIENT)
Dept: CARDIOLOGY | Facility: CLINIC | Age: 65
End: 2020-11-17

## 2020-11-17 ENCOUNTER — TELEPHONE (OUTPATIENT)
Dept: CARDIAC REHAB | Facility: HOSPITAL | Age: 65
End: 2020-11-17

## 2020-11-17 NOTE — TELEPHONE ENCOUNTER
Called patient regarding her images from [facility] from [facility].  Cath looked good and would continue to focus on medical therapy at this time and will follow up on response to coreg.

## 2020-11-18 ENCOUNTER — HOSPITAL ENCOUNTER (OUTPATIENT)
Dept: CARDIOLOGY | Facility: HOSPITAL | Age: 65
Discharge: HOME | End: 2020-11-18

## 2020-11-18 DIAGNOSIS — Z09 FOLLOW UP: ICD-10-CM

## 2020-11-20 ENCOUNTER — TRANSCRIBE ORDERS (OUTPATIENT)
Dept: CARDIAC REHAB | Facility: HOSPITAL | Age: 65
End: 2020-11-20

## 2020-11-20 ENCOUNTER — TELEPHONE (OUTPATIENT)
Dept: CARDIOLOGY | Facility: CLINIC | Age: 65
End: 2020-11-20

## 2020-11-20 DIAGNOSIS — I25.10 CAD S/P PERCUTANEOUS CORONARY ANGIOPLASTY: Primary | ICD-10-CM

## 2020-11-20 DIAGNOSIS — Z98.61 CAD S/P PERCUTANEOUS CORONARY ANGIOPLASTY: Primary | ICD-10-CM

## 2020-11-20 RX ORDER — CARVEDILOL 12.5 MG/1
12.5 TABLET ORAL 2 TIMES DAILY WITH MEALS
Qty: 180 TABLET | Refills: 3
Start: 2020-11-20 | End: 2021-02-05

## 2020-11-20 NOTE — TELEPHONE ENCOUNTER
Called patient back and BP and HR still up and still with some CP.  I did review the images form [facility] and final angiogram was normal and no over revascularization targets and will focus on medical optimization.    Will increase Coreg from 6.25 to 12.5 mg    May consider nitrates or calcium channel block if still symptomatic

## 2020-12-03 DIAGNOSIS — I10 ESSENTIAL HYPERTENSION: Primary | ICD-10-CM

## 2020-12-10 ENCOUNTER — OFFICE VISIT (OUTPATIENT)
Dept: CARDIAC REHAB | Facility: HOSPITAL | Age: 65
End: 2020-12-10

## 2020-12-10 ENCOUNTER — TELEPHONE (OUTPATIENT)
Dept: CARDIAC REHAB | Facility: HOSPITAL | Age: 65
End: 2020-12-10

## 2020-12-10 ENCOUNTER — LAB (OUTPATIENT)
Dept: LAB | Facility: HOSPITAL | Age: 65
End: 2020-12-10

## 2020-12-10 DIAGNOSIS — Z95.5 S/P DRUG ELUTING CORONARY STENT PLACEMENT: Primary | ICD-10-CM

## 2020-12-10 DIAGNOSIS — I10 ESSENTIAL HYPERTENSION: ICD-10-CM

## 2020-12-10 DIAGNOSIS — I21.4 NSTEMI, INITIAL EPISODE OF CARE (HCC): ICD-10-CM

## 2020-12-10 LAB
ANION GAP SERPL CALCULATED.3IONS-SCNC: 8.4 MMOL/L (ref 5–15)
BUN SERPL-MCNC: 9 MG/DL (ref 8–23)
BUN/CREAT SERPL: 12.9 (ref 7–25)
CALCIUM SPEC-SCNC: 9.2 MG/DL (ref 8.6–10.5)
CHLORIDE SERPL-SCNC: 105 MMOL/L (ref 98–107)
CO2 SERPL-SCNC: 26.6 MMOL/L (ref 22–29)
CREAT SERPL-MCNC: 0.7 MG/DL (ref 0.57–1)
GFR SERPL CREATININE-BSD FRML MDRD: 84 ML/MIN/1.73
GLUCOSE SERPL-MCNC: 83 MG/DL (ref 65–99)
POTASSIUM SERPL-SCNC: 3.9 MMOL/L (ref 3.5–5.2)
SODIUM SERPL-SCNC: 140 MMOL/L (ref 136–145)

## 2020-12-10 PROCEDURE — 93797 PHYS/QHP OP CAR RHAB WO ECG: CPT

## 2020-12-10 PROCEDURE — 80048 BASIC METABOLIC PNL TOTAL CA: CPT

## 2020-12-10 PROCEDURE — 36415 COLL VENOUS BLD VENIPUNCTURE: CPT

## 2020-12-11 ENCOUNTER — TELEPHONE (OUTPATIENT)
Dept: CARDIOLOGY | Facility: CLINIC | Age: 65
End: 2020-12-11

## 2020-12-11 RX ORDER — HYDROCHLOROTHIAZIDE 12.5 MG/1
12.5 CAPSULE, GELATIN COATED ORAL DAILY
Qty: 90 CAPSULE | Refills: 3
Start: 2020-12-11 | End: 2021-02-05

## 2020-12-11 NOTE — TELEPHONE ENCOUNTER
Called patient back regarding BP and chest pain at cardiac rehab.  Unfortunately, I think she is a little confused regarding her medications    She is still only taking 6.25 mg BID rather than 12.5 mg BID.  She also has not started her HCTZ 12.5 mg either.    I have asked her to make these changes and we have moved her appointment up to make sure that these changes are made and that her blood pressure is improving.  If still not at goal would consider increasing coreg or adding amlodipine or imdur

## 2020-12-14 ENCOUNTER — TREATMENT (OUTPATIENT)
Dept: CARDIAC REHAB | Facility: HOSPITAL | Age: 65
End: 2020-12-14

## 2020-12-14 DIAGNOSIS — Z95.5 S/P DRUG ELUTING CORONARY STENT PLACEMENT: Primary | ICD-10-CM

## 2020-12-14 PROCEDURE — 93798 PHYS/QHP OP CAR RHAB W/ECG: CPT

## 2020-12-16 ENCOUNTER — TREATMENT (OUTPATIENT)
Dept: CARDIAC REHAB | Facility: HOSPITAL | Age: 65
End: 2020-12-16

## 2020-12-16 DIAGNOSIS — Z95.5 S/P DRUG ELUTING CORONARY STENT PLACEMENT: Primary | ICD-10-CM

## 2020-12-16 PROCEDURE — 93798 PHYS/QHP OP CAR RHAB W/ECG: CPT

## 2020-12-17 ENCOUNTER — OFFICE VISIT (OUTPATIENT)
Dept: CARDIOLOGY | Facility: CLINIC | Age: 65
End: 2020-12-17

## 2020-12-17 VITALS
HEART RATE: 63 BPM | WEIGHT: 126.4 LBS | SYSTOLIC BLOOD PRESSURE: 128 MMHG | HEIGHT: 59 IN | BODY MASS INDEX: 25.48 KG/M2 | DIASTOLIC BLOOD PRESSURE: 80 MMHG

## 2020-12-17 DIAGNOSIS — I10 ESSENTIAL HYPERTENSION: ICD-10-CM

## 2020-12-17 DIAGNOSIS — I25.118 CORONARY ARTERY DISEASE OF NATIVE ARTERY OF NATIVE HEART WITH STABLE ANGINA PECTORIS (HCC): Primary | ICD-10-CM

## 2020-12-17 DIAGNOSIS — E78.2 MIXED HYPERLIPIDEMIA: ICD-10-CM

## 2020-12-17 PROCEDURE — 99214 OFFICE O/P EST MOD 30 MIN: CPT | Performed by: INTERNAL MEDICINE

## 2020-12-17 PROCEDURE — 93000 ELECTROCARDIOGRAM COMPLETE: CPT | Performed by: INTERNAL MEDICINE

## 2020-12-17 RX ORDER — AMLODIPINE BESYLATE 2.5 MG/1
2.5 TABLET ORAL DAILY
Qty: 90 TABLET | Refills: 3 | Status: SHIPPED | OUTPATIENT
Start: 2020-12-17 | End: 2021-02-05

## 2020-12-17 NOTE — PROGRESS NOTES
[facility] New Patient Office Note     Encounter Date:20  Patient:Domitila Paiz  :1955  MRN:[id number]      Chief Complaint:   Chief Complaint   Patient presents with   • Chest Pain     1 month f/u   • Shortness of Breath   • Coronary Artery Disease     History of Presenting Illness:      Ms. Paiz is a 65 y.o. woman past medical history notable for coronary artery disease status post percutaneous intervention, hypertension, mixed hyperlipidemia, and chronic back pain related orthopedic issues status post surgeries who presents to our office for follow up.  Since her last visit she was still having issues with elevated blood pressures.  Unfortunate there is some confusion regarding recommendations were made regarding titration of her blood pressure regimen but fortunately after clarifying these and increasing her carvedilol up to 12.5 mg twice a day as well as restarting her hydrochlorothiazide her blood pressure is making improvements.  She has started cardiac rehab and after getting her blood pressure better controlled is doing better at cardiac rehab.  She does continue to have occasional episodes of right-sided chest pain.  This pain typically occurs randomly throughout the day.  Will occur maybe 4 or 5 times a week sporadically.  Her pain is not exertional.  In general from an exertional standpoint she is slowly improving.  She actually went on a hike with her son this past weekend and did quite well.    Her only other complaint is occasional leg swelling and pain on her left side of her leg.  She did have some prior spine surgery issues      Review of Systems:  Review of Systems   Constitution: Positive for malaise/fatigue.   HENT: Negative.    Eyes: Negative.    Cardiovascular: Positive for chest pain, dyspnea on exertion and leg swelling.   Respiratory: Positive for shortness of breath.    Endocrine: Negative.    Hematologic/Lymphatic: Negative.    Skin: Negative.     Musculoskeletal: Negative.    Gastrointestinal: Negative.    Genitourinary: Negative.    Neurological: Negative.    Psychiatric/Behavioral: Negative.    Allergic/Immunologic: Negative.        Current Outpatient Medications on File Prior to Visit   Medication Sig Dispense Refill   • albuterol sulfate  (90 Base) MCG/ACT inhaler Inhale 2 puffs.     • aspirin 81 MG EC tablet Take 81 mg by mouth Daily.     • carvedilol (COREG) 12.5 MG tablet Take 1 tablet by mouth 2 (Two) Times a Day With Meals. 180 tablet 3   • doxycycline (ADOXA) 100 MG tablet Take 100 mg by mouth Daily.     • ezetimibe (ZETIA) 10 MG tablet Take 10 mg by mouth Daily.     • hydroCHLOROthiazide (MICROZIDE) 12.5 MG capsule Take 1 capsule by mouth Daily. 90 capsule 3   • HYDROcodone-acetaminophen (NORCO) 5-325 MG per tablet Take 1 tablet by mouth Every 6 (Six) Hours As Needed.     • multivitamin with minerals (MULTIPLE VITAMINS-MINERALS PO) Take 1 tablet by mouth Daily.     • nitroglycerin (NITROSTAT) 0.4 MG SL tablet Place 0.4 mg under the tongue.     • omeprazole (priLOSEC) 20 MG capsule Take 20 mg by mouth Daily.     • prasugrel (EFFIENT) 10 MG tablet Take 10 mg by mouth Daily.     • rizatriptan (MAXALT) 10 MG tablet Take 10 mg by mouth 1 (One) Time As Needed for Migraine. May repeat in 2 hours if needed     • rosuvastatin (CRESTOR) 20 MG tablet Take 20 mg by mouth Daily.     • valsartan (DIOVAN) 40 MG tablet Take 40 mg by mouth Daily.       No current facility-administered medications on file prior to visit.          Allergies   Allergen Reactions   • Wasp Venom Anaphylaxis   • Morphine Other (See Comments) and Hives     Pt states she almost od'd in the hosp on this years ago   • Poison Ivy Extract Hives   • Butorphanol Itching, Rash and Hives       Past Medical History:   Diagnosis Date   • Hypertension        Past Surgical History:   Procedure Laterality Date   • CARDIAC SURGERY     • COLONOSCOPY  2012    Dr. Lam   • HYSTERECTOMY    "      Social History     Socioeconomic History   • Marital status:      Spouse name: Not on file   • Number of children: Not on file   • Years of education: Not on file   • Highest education level: Not on file   Tobacco Use   • Smoking status: Never Smoker   • Smokeless tobacco: Never Used   Substance and Sexual Activity   • Alcohol use: No     Frequency: Never     Comment: caffeine use    • Drug use: No   • Sexual activity: Yes     Partners: Male       Family History   Problem Relation Age of Onset   • Hypertension Mother        The following portions of the patient's history were reviewed and updated as appropriate: allergies, current medications, past family history, past medical history, past social history, past surgical history and problem list.       Objective:       Vitals:    12/17/20 0831   BP: 128/80   BP Location: Left arm   Patient Position: Sitting   Pulse: 63   Weight: 57.3 kg (126 lb 6.4 oz)   Height: 149.9 cm (59\")       Physical Exam:  Constitutional: Well appearing, well developed, no acute distress   HENT: Oropharynx clear and membrane moist  Eyes: Normal conjunctiva, no sclera icterus.  Neck: Supple, no carotid bruit bilaterally.  Cardiovascular: Regular rate and rhythm, No Murmur, No bilateral lower extremity edema.  Pulmonary: Normal respiratory effort, normal lung sounds, no wheezing.  Abdominal: Soft, nontender, no hepatosplenomegaly, liver is non-pulsatile.  Neurological: Alert and orient x 3.   Skin: Warm, dry, no ecchymosis, no rash.  Psych: Appropriate mood and affect. Normal judgment and insight.      Lab Results   Component Value Date    GLUCOSE 83 12/10/2020    BUN 9 12/10/2020    CREATININE 0.70 12/10/2020    EGFRIFNONA 84 12/10/2020    BCR 12.9 12/10/2020    K 3.9 12/10/2020    CO2 26.6 12/10/2020    CALCIUM 9.2 12/10/2020    ALBUMIN 4.30 11/06/2020    LABIL2 1.3 03/14/2019    AST 43 (H) 11/06/2020    ALT 31 11/06/2020       Lab Results   Component Value Date    WBC 5.90 " 11/23/2020    HGB 9.7 (L) 11/23/2020    HCT 32.2 (L) 11/23/2020    MCV 85.6 11/23/2020     11/23/2020       Lab Results   Component Value Date    TROPONINI 0.092 (H) 11/02/2020    TROPONINT <0.010 11/06/2020       Lab Results   Component Value Date    CHLPL 214 (H) 03/14/2019    CHLPL 208 (H) 07/13/2018    CHLPL 200 (H) 06/20/2018     Lab Results   Component Value Date    TRIG 60 03/14/2019    TRIG 87 07/13/2018    TRIG 56 06/20/2018     Lab Results   Component Value Date    HDL 69 03/14/2019    HDL 65 07/13/2018    HDL 67 06/20/2018     Lab Results   Component Value Date     (H) 03/14/2019     (H) 07/13/2018     (H) 06/20/2018       Lab Results   Component Value Date    TSH 0.878 11/18/2020         ECG 12 Lead    Date/Time: 12/17/2020 9:30 AM  Performed by: Carlos A Vasques MD  Authorized by: Carlos A Vasques MD   Comparison: compared with previous ECG from 11/11/2020  Similar to previous ECG  Rhythm: sinus rhythm    Clinical impression: normal ECG        Cardiac catheterization 11/3/2020:  · Left main: This gives rise to the LAD and left circumflex. The left main is free of disease.  · LAD: This gives rise to a single diagonal fairly distally. It terminates at the apex. The stent in the proximal vessel is widely patent. Just prior to the diagonal branch there is a focal somewhat hazy 75% stenosis focally  · LCx: This gives rise to 2 moderate marginal is a small terminal marginal. The left circumflex is free of disease.  · RCA: This is dominant giving rise to the PDA and a posterolateral left ventricular branch both of which are large. These extend out to the ape with up to 20% narrowing in the ostium of the PDA x. The right coronary has luminal irregularity  · Successful percutaneous intervention to mid LAD with placement of 2.25 x 12 mm resolute Kt drug-eluting stent    Cardiac catheterization 10/30/2020:  · No change from cardiac catheterization earlier in the day    Cardiac  catheterization 10/30/2020:  · Left Main: The left main is a large caliber vessel with a normal take off from the left coronary cusp that bifurcates to form a left anterior descending artery and a left circumflex artery. There is no angiographically significant coronary artery disease noted.  · Left anterior descending artery:The LAD is a medium caliber vessel. There is a non-calcified underfilled segment proximally followed by sequential calcified lesions 50-75% with a focal mid >75% lesion. There is a focal mid calcified 50% stenosis. There is a small caliber high takeoff diagonal and a moderate sized mid/distal takeoff diagonal 2 with no significant disease  · Left circumflex artery: The circumflex is a medium caliber, non-dominant vessel. There is a mid trifurcation where OM1 and OM2 takeoff from a small caliber AV groove branch. There is a motion artifact mid OM1/2 that is uninterpretable, these are moderate caliber vessels with no significant disease  · Right coronary artery: The RCA is a large caliber, dominant vessel. It has a normal takeoff from the right coronary cusp. The RCA terminates as a PDA and right posterolateral branch. There are focal <25% calcified stenoses in the mid RCA  · Left Ventricle: The ventricular cavity size is within normal limits. There are no stigmata of prior infarction. There is no abnormal filling defect. LVEF is estimated at 55%.  · Successful implantation of a 2.5 x18 mm resolute Kt drug-eluting stent to proximal LAD stenoses    Cardiac CTA/16/2020:  · Total calcium score 125.   · Normal coronary origins and courses.    · Severe proximal LAD disease with sequential lesions, maximum >75%        Assessment:          Diagnosis Plan   1. Coronary artery disease of native artery of native heart with stable angina pectoris (CMS/[facility])     2. Mixed hyperlipidemia     3. Essential hypertension            Plan:       Ms. Paiz is a 65 y.o. woman past medical history notable for  coronary artery disease status post percutaneous intervention, hypertension, mixed hyperlipidemia, and chronic back pain related orthopedic issues status post surgeries who presents to our office for scheduled follow-up.  Overall patient is doing better but still having occasional episodes of chest pain.  I would like to start amlodipine to see if some of this is somewhat related of coronary artery spasm to help.  This would also give us added benefit of improving her blood pressure which at home is still running occasionally in the 140s to 150s but in general her blood pressure heart rate are significantly improved.  I did obtain the CDs of her recent cardiac catheterization and reviewed the images which demonstrate good angiographic results after percutaneous interventions.  I would try and focus on continued medical management for the time being over repeat ischemic evaluation or cardiac catheterization.    Coronary artery disease with stable angina:  · Will continue dual antiplatelet therapy  · Continue carvedilol 12.5 mg BID  · Continue high potency statin  · We will start amlodipine 2.5 mg and monitor response    Hypertension:  · Blood pressure well controlled continue with current therapy and will monitor response to starting low-dose amlodipine    Mixed Hyperlipidemia:  · Continue statin as LDL prior to starting statin therapy  · Will get follow-up lipid panel and on follow-up    Follow Up:  3 Months      Thank you for allowing me to participate in the care of Domitila Paiz. Feel free to contact me directly with any further questions or concerns.    Carlos A Vasques MD  [facility]  12/17/20  09:31 EST

## 2020-12-28 ENCOUNTER — TREATMENT (OUTPATIENT)
Dept: CARDIAC REHAB | Facility: HOSPITAL | Age: 65
End: 2020-12-28

## 2020-12-28 DIAGNOSIS — Z95.5 S/P DRUG ELUTING CORONARY STENT PLACEMENT: Primary | ICD-10-CM

## 2020-12-28 DIAGNOSIS — I21.4 NSTEMI, INITIAL EPISODE OF CARE (HCC): ICD-10-CM

## 2020-12-28 PROCEDURE — 93798 PHYS/QHP OP CAR RHAB W/ECG: CPT

## 2021-01-04 ENCOUNTER — TREATMENT (OUTPATIENT)
Dept: CARDIAC REHAB | Facility: HOSPITAL | Age: 66
End: 2021-01-04

## 2021-01-04 DIAGNOSIS — Z95.5 S/P DRUG ELUTING CORONARY STENT PLACEMENT: Primary | ICD-10-CM

## 2021-01-04 PROCEDURE — 93798 PHYS/QHP OP CAR RHAB W/ECG: CPT

## 2021-01-08 ENCOUNTER — TREATMENT (OUTPATIENT)
Dept: CARDIAC REHAB | Facility: HOSPITAL | Age: 66
End: 2021-01-08

## 2021-01-08 DIAGNOSIS — Z95.5 S/P DRUG ELUTING CORONARY STENT PLACEMENT: Primary | ICD-10-CM

## 2021-01-08 PROCEDURE — 93798 PHYS/QHP OP CAR RHAB W/ECG: CPT

## 2021-01-11 ENCOUNTER — TREATMENT (OUTPATIENT)
Dept: CARDIAC REHAB | Facility: HOSPITAL | Age: 66
End: 2021-01-11

## 2021-01-11 DIAGNOSIS — Z95.5 S/P DRUG ELUTING CORONARY STENT PLACEMENT: Primary | ICD-10-CM

## 2021-01-11 PROCEDURE — 93798 PHYS/QHP OP CAR RHAB W/ECG: CPT

## 2021-01-13 ENCOUNTER — TREATMENT (OUTPATIENT)
Dept: CARDIAC REHAB | Facility: HOSPITAL | Age: 66
End: 2021-01-13

## 2021-01-13 DIAGNOSIS — Z95.5 S/P DRUG ELUTING CORONARY STENT PLACEMENT: Primary | ICD-10-CM

## 2021-01-13 PROCEDURE — 93798 PHYS/QHP OP CAR RHAB W/ECG: CPT

## 2021-01-20 ENCOUNTER — TREATMENT (OUTPATIENT)
Dept: CARDIAC REHAB | Facility: HOSPITAL | Age: 66
End: 2021-01-20

## 2021-01-20 DIAGNOSIS — Z95.5 S/P DRUG ELUTING CORONARY STENT PLACEMENT: Primary | ICD-10-CM

## 2021-01-20 PROCEDURE — 93798 PHYS/QHP OP CAR RHAB W/ECG: CPT

## 2021-01-22 ENCOUNTER — TREATMENT (OUTPATIENT)
Dept: CARDIAC REHAB | Facility: HOSPITAL | Age: 66
End: 2021-01-22

## 2021-01-22 DIAGNOSIS — I21.4 NSTEMI, INITIAL EPISODE OF CARE (HCC): Primary | ICD-10-CM

## 2021-01-22 DIAGNOSIS — Z95.5 S/P DRUG ELUTING CORONARY STENT PLACEMENT: ICD-10-CM

## 2021-01-22 LAB — GLUCOSE BLDC GLUCOMTR-MCNC: 115 MG/DL (ref 70–130)

## 2021-01-22 PROCEDURE — 93798 PHYS/QHP OP CAR RHAB W/ECG: CPT

## 2021-01-22 PROCEDURE — 82962 GLUCOSE BLOOD TEST: CPT

## 2021-01-22 NOTE — CONSULTS
Met with patient to discuss stress and stress management. Pt has many good stress mamagement habits already. Talked about the necessity of building in relaxation as often as possible. Explored other stress management techniques such as lani and prayer. Discussed how and why stress affects the body. Patient is concerned about possible diabetes and sleep problems. Advised patient to follow up with her doctor concerning both as soon as possible.

## 2021-01-25 ENCOUNTER — TREATMENT (OUTPATIENT)
Dept: CARDIAC REHAB | Facility: HOSPITAL | Age: 66
End: 2021-01-25

## 2021-01-25 DIAGNOSIS — I21.4 NSTEMI, INITIAL EPISODE OF CARE (HCC): Primary | ICD-10-CM

## 2021-01-25 DIAGNOSIS — Z95.5 S/P DRUG ELUTING CORONARY STENT PLACEMENT: ICD-10-CM

## 2021-01-25 PROCEDURE — 93798 PHYS/QHP OP CAR RHAB W/ECG: CPT

## 2021-01-27 ENCOUNTER — TREATMENT (OUTPATIENT)
Dept: CARDIAC REHAB | Facility: HOSPITAL | Age: 66
End: 2021-01-27

## 2021-01-27 DIAGNOSIS — Z95.5 S/P DRUG ELUTING CORONARY STENT PLACEMENT: ICD-10-CM

## 2021-01-27 DIAGNOSIS — I21.4 NSTEMI, INITIAL EPISODE OF CARE (HCC): Primary | ICD-10-CM

## 2021-01-27 LAB
GLUCOSE BLDC GLUCOMTR-MCNC: 132 MG/DL (ref 70–130)
GLUCOSE BLDC GLUCOMTR-MCNC: 73 MG/DL (ref 70–130)

## 2021-01-27 PROCEDURE — 82962 GLUCOSE BLOOD TEST: CPT

## 2021-01-27 PROCEDURE — 93798 PHYS/QHP OP CAR RHAB W/ECG: CPT

## 2021-02-03 ENCOUNTER — TREATMENT (OUTPATIENT)
Dept: CARDIAC REHAB | Facility: HOSPITAL | Age: 66
End: 2021-02-03

## 2021-02-03 DIAGNOSIS — I21.4 NSTEMI, INITIAL EPISODE OF CARE (HCC): Primary | ICD-10-CM

## 2021-02-03 PROCEDURE — 93798 PHYS/QHP OP CAR RHAB W/ECG: CPT

## 2021-02-05 ENCOUNTER — LAB (OUTPATIENT)
Dept: LAB | Facility: HOSPITAL | Age: 66
End: 2021-02-05

## 2021-02-05 ENCOUNTER — TREATMENT (OUTPATIENT)
Dept: CARDIAC REHAB | Facility: HOSPITAL | Age: 66
End: 2021-02-05

## 2021-02-05 ENCOUNTER — TELEPHONE (OUTPATIENT)
Dept: CARDIOLOGY | Facility: CLINIC | Age: 66
End: 2021-02-05

## 2021-02-05 ENCOUNTER — OFFICE VISIT (OUTPATIENT)
Dept: CARDIOLOGY | Facility: CLINIC | Age: 66
End: 2021-02-05

## 2021-02-05 VITALS
BODY MASS INDEX: 26.08 KG/M2 | HEART RATE: 68 BPM | DIASTOLIC BLOOD PRESSURE: 64 MMHG | WEIGHT: 129.4 LBS | SYSTOLIC BLOOD PRESSURE: 96 MMHG | HEIGHT: 59 IN

## 2021-02-05 DIAGNOSIS — I21.4 NSTEMI, INITIAL EPISODE OF CARE (HCC): Primary | ICD-10-CM

## 2021-02-05 DIAGNOSIS — I25.118 CORONARY ARTERY DISEASE OF NATIVE ARTERY OF NATIVE HEART WITH STABLE ANGINA PECTORIS (HCC): ICD-10-CM

## 2021-02-05 DIAGNOSIS — R55 NEAR SYNCOPE: ICD-10-CM

## 2021-02-05 DIAGNOSIS — E78.2 MIXED HYPERLIPIDEMIA: ICD-10-CM

## 2021-02-05 DIAGNOSIS — I10 ESSENTIAL HYPERTENSION: ICD-10-CM

## 2021-02-05 DIAGNOSIS — I25.118 CORONARY ARTERY DISEASE OF NATIVE ARTERY OF NATIVE HEART WITH STABLE ANGINA PECTORIS (HCC): Primary | ICD-10-CM

## 2021-02-05 DIAGNOSIS — Z95.5 S/P DRUG ELUTING CORONARY STENT PLACEMENT: ICD-10-CM

## 2021-02-05 DIAGNOSIS — R00.2 PALPITATIONS: ICD-10-CM

## 2021-02-05 DIAGNOSIS — R42 DIZZINESS: ICD-10-CM

## 2021-02-05 LAB
CHOLEST SERPL-MCNC: 111 MG/DL (ref 0–200)
HDLC SERPL-MCNC: 58 MG/DL (ref 40–60)
LDLC SERPL CALC-MCNC: 33 MG/DL (ref 0–100)
LDLC/HDLC SERPL: 0.54 {RATIO}
TRIGL SERPL-MCNC: 107 MG/DL (ref 0–150)
VLDLC SERPL-MCNC: 20 MG/DL (ref 5–40)

## 2021-02-05 PROCEDURE — 93798 PHYS/QHP OP CAR RHAB W/ECG: CPT

## 2021-02-05 PROCEDURE — 93000 ELECTROCARDIOGRAM COMPLETE: CPT | Performed by: NURSE PRACTITIONER

## 2021-02-05 PROCEDURE — 99214 OFFICE O/P EST MOD 30 MIN: CPT | Performed by: NURSE PRACTITIONER

## 2021-02-05 PROCEDURE — 36415 COLL VENOUS BLD VENIPUNCTURE: CPT

## 2021-02-05 PROCEDURE — 80061 LIPID PANEL: CPT

## 2021-02-05 RX ORDER — CARVEDILOL 6.25 MG/1
6.25 TABLET ORAL 2 TIMES DAILY WITH MEALS
Start: 2021-02-05 | End: 2021-04-28 | Stop reason: SDUPTHER

## 2021-02-05 NOTE — TELEPHONE ENCOUNTER
Advised pt of stable labs and that there will be no changes to their regimen at this time. Advised to call office w/ any further questions or concerns.    RAFFY Larson

## 2021-02-05 NOTE — PROGRESS NOTES
Date of Office Visit: 2021  Encounter Provider: GIOVANNY Liu  Place of Service: [facility] CARDIOLOGY  Patient Name: Domitila Paiz  :1955    Chief Complaint   Patient presents with   • Syncope   :     HPI: Domitila Paiz is a 65 y.o. female who presents today for follow-up.  Old records have been obtained and reviewed by me.  She is a patient with a past medical history significant for coronary artery disease status post PCI, hypertension, and hyperlipidemia.  In 2020, she had a cardiac CTA at [facility] demonstrating LAD stenosis.  This was followed by cardiac catheterization and stenting of the proximal LAD.  Following that procedure, she continued having chest pain and subsequently underwent repeat cardiac catheterization demonstrating stable coronary findings.  Due to continued chest pain, she presented back to the ED where she was noted to have an elevated troponin.  Repeat cardiac catheterization demonstrated progression of the mid LAD stenosis for which she underwent a second stent placement.     She was first seen in our office by Dr. Vasques in 2020 for evaluation of her coronary disease and recent stenting.  Unfortunately, she continued having chest discomfort in addition to significant dyspnea on exertion.  She had been in our emergency room prior to that appointment for the same complaints at which time her troponin and EKG were normal.  Dr. Vasques reviewed films from her cardiac catheterization and felt everything looked stable.  Medical therapy was recommended and she was transitioned to carvedilol.   She was last seen in the office by Dr. Vasques on 2020 at which time she reported continued occasional episodes of right-sided chest pain which was overall improved.  Her only other complaint was occasional leg swelling and pain on the left side of her leg.  Dr. Vasques started her on amlodipine.  She was advised to  follow-up in 3 months.   She presents today with several complaints and concerns.  She is still having chest pains on and off that are not exertional lasting anywhere from 5 minutes to an hour.  Reportedly, they are the same as they were when she saw Dr. Vasques.  She has shortness of breath all the time that is certainly worse with exertion.  She denies any PND orthopnea.  She is fatigued all of the time.  She has occasional palpitations.  Recently, she has been having terrible issues with dizziness and low blood pressure spells.  These usually occur following her cardiac rehab session.  She becomes very dizzy and diaphoretic like she is going to pass out.  This usually coincides with blood pressures in the 90s systolic.  However, her blood pressure in the morning before taking any of her medications is usually in the 140 systolic.  Her PCP stopped her amlodipine 2 days ago.  She has been taking the carvedilol 6.25 mg in the a.m. and 12.5 mg in the p.m.  Additionally, she has been having issues with low blood sugars.  She saw her PCP a few weeks ago and was diagnosed with diabetes.  Her hemoglobin A1c was 7.4.  However, she was not started on any medications.  Evidently her PCP wanted her to discuss this with us before hand.  She reports bouts of feeling very hot and diaphoretic but also bouts of feeling very cold.  She denies any edema, syncope, bleeding difficulties or melena.    Past Medical History:   Diagnosis Date   • Hypertension        Past Surgical History:   Procedure Laterality Date   • CARDIAC SURGERY     • COLONOSCOPY  2012    Dr. Lam   • HYSTERECTOMY         Social History     Socioeconomic History   • Marital status:      Spouse name: Not on file   • Number of children: Not on file   • Years of education: Not on file   • Highest education level: Not on file   Tobacco Use   • Smoking status: Never Smoker   • Smokeless tobacco: Never Used   Substance and Sexual Activity   • Alcohol use: No      Frequency: Never     Comment: caffeine use    • Drug use: No   • Sexual activity: Yes     Partners: Male       Family History   Problem Relation Age of Onset   • Hypertension Mother        Review of Systems   Constitution: Positive for malaise/fatigue.   Cardiovascular: Positive for chest pain, dyspnea on exertion, near-syncope and palpitations. Negative for leg swelling, orthopnea, paroxysmal nocturnal dyspnea and syncope.   Respiratory: Negative.    Hematologic/Lymphatic: Negative for bleeding problem.   Musculoskeletal: Negative for falls.   Gastrointestinal: Negative for melena.   Neurological: Positive for light-headedness. Negative for dizziness.       Allergies   Allergen Reactions   • Wasp Venom Anaphylaxis   • Morphine Other (See Comments) and Hives     Pt states she almost od'd in the hosp on this years ago   • Poison Ivy Extract Hives   • Butorphanol Itching, Rash and Hives         Current Outpatient Medications:   •  albuterol sulfate  (90 Base) MCG/ACT inhaler, Inhale 2 puffs., Disp: , Rfl:   •  aspirin 81 MG EC tablet, Take 81 mg by mouth Daily., Disp: , Rfl:   •  carvedilol (COREG) 6.25 MG tablet, Take 1 tablet by mouth 2 (Two) Times a Day With Meals., Disp: , Rfl:   •  doxycycline (ADOXA) 100 MG tablet, Take 100 mg by mouth Daily., Disp: , Rfl:   •  ezetimibe (ZETIA) 10 MG tablet, Take 10 mg by mouth Daily., Disp: , Rfl:   •  HYDROcodone-acetaminophen (NORCO) 5-325 MG per tablet, Take 1 tablet by mouth Every 6 (Six) Hours As Needed., Disp: , Rfl:   •  multivitamin with minerals (MULTIPLE VITAMINS-MINERALS PO), Take 1 tablet by mouth Daily., Disp: , Rfl:   •  nitroglycerin (NITROSTAT) 0.4 MG SL tablet, Place 0.4 mg under the tongue., Disp: , Rfl:   •  omeprazole (priLOSEC) 20 MG capsule, Take 20 mg by mouth Daily., Disp: , Rfl:   •  prasugrel (EFFIENT) 10 MG tablet, Take 10 mg by mouth Daily., Disp: , Rfl:   •  rizatriptan (MAXALT) 10 MG tablet, Take 10 mg by mouth 1 (One) Time As Needed for  "Migraine. May repeat in 2 hours if needed, Disp: , Rfl:   •  rosuvastatin (CRESTOR) 20 MG tablet, Take 20 mg by mouth Daily., Disp: , Rfl:   •  valsartan (DIOVAN) 40 MG tablet, Take 40 mg by mouth Daily., Disp: , Rfl:       Objective:     Vitals:    02/05/21 0955 02/05/21 1005   BP: 108/70 96/64   BP Location: Left arm Right arm   Patient Position: Sitting Sitting   Pulse: 68    Weight: 58.7 kg (129 lb 6.4 oz)    Height: 149.9 cm (59\")      Body mass index is 26.14 kg/m².    PHYSICAL EXAM:    Neck:      Vascular: No JVD.   Pulmonary:      Effort: Pulmonary effort is normal.      Breath sounds: Normal breath sounds.   Cardiovascular:      Normal rate. Regular rhythm.      Murmurs: There is no murmur.      No gallop. No click. No rub.   Pulses:     Intact distal pulses.           ECG 12 Lead    Date/Time: 2/5/2021 10:11 AM  Performed by: Stefania Bey APRN  Authorized by: Stefania Bey APRN   Comparison: compared with previous ECG from 12/17/2020  Similar to previous ECG  Rhythm: sinus rhythm  Rate: normal  BPM: 68  T inversion: V1  T flattening: aVL and V2    Clinical impression: normal ECG  Comments: Indication: CAD              Assessment:       Diagnosis Plan   1. Coronary artery disease of native artery of native heart with stable angina pectoris (CMS/[facility])  ECG 12 Lead    Lipid Panel   2. Essential hypertension     3. Mixed hyperlipidemia  Lipid Panel   4. Dizziness  Holter Monitor - 24 Hour   5. Near syncope  Holter Monitor - 24 Hour   6. Palpitations       Orders Placed This Encounter   Procedures   • Lipid Panel     Standing Status:   Future     Number of Occurrences:   1     Standing Expiration Date:   2/5/2022   • Holter Monitor - 24 Hour     Standing Status:   Future     Standing Expiration Date:   2/5/2022     Order Specific Question:   Reason for exam?     Answer:   Dizziness     Order Specific Question:   Reason for exam?     Answer:   Presyncope or Syncope   • ECG 12 Lead     This order " was created via procedure documentation          Plan:         1.  Coronary artery disease.  Status post stenting of the proximal LAD in October 2020.  She is still reporting continued complaints of intermittent chest pain; however, it is unchanged from prior.  Her EKG is very stable.  Continue dual antiplatelet therapy with aspirin and Effient as well as carvedilol and statin.      2.  Hypertension.  Her blood pressure is actually low today.  I think low blood pressure is driving a lot of her symptoms of dizziness and near syncope.  I am going to stop her hydrochlorothiazide and decrease the carvedilol to 6.25 mg twice daily.  I advised her to call me with an update next week.      3.  Hyperlipidemia.  She is on high intensity statin therapy with rosuvastatin 20 mg daily.  Repeat lipid panel today revealed an LDL of 33 and an HDL of 58.  Continue current regimen.      4.  Dizziness/near syncope/low blood pressure.  As mentioned above, I think all of these issues are related to low blood pressure.  In addition to the medication changes, I will also order a 24-hour Holter monitor to rule out any underlying arrhythmias although my suspicion is low.      Overall I think she is stable.  In regards to her diabetes, I certainly think she needs medication.  I encouraged her to follow back up with her PCP.  I discussed her continued complaints of chest pain and shortness of breath with Dr. Vasques.  He agrees with the changes to the blood pressure medications.  We will just have to see how she does with this adjustment to make further changes to her antianginal regimen.  She will follow-up with Dr. Vasques on 3/25/2021 or sooner if needed.      As always, it has been a pleasure to participate in your patient's care.      Sincerely,         GIOVANNY Mercado

## 2021-02-05 NOTE — TELEPHONE ENCOUNTER
----- Message from GIOVANNY Galo sent at 2/5/2021 12:53 PM EST -----  Please let her know her cholesterol looks excellent.  Continue current regimen.

## 2021-02-08 ENCOUNTER — TREATMENT (OUTPATIENT)
Dept: CARDIAC REHAB | Facility: HOSPITAL | Age: 66
End: 2021-02-08

## 2021-02-08 DIAGNOSIS — I21.4 NSTEMI, INITIAL EPISODE OF CARE (HCC): Primary | ICD-10-CM

## 2021-02-08 PROCEDURE — 93798 PHYS/QHP OP CAR RHAB W/ECG: CPT

## 2021-02-11 ENCOUNTER — TELEPHONE (OUTPATIENT)
Dept: CARDIOLOGY | Facility: CLINIC | Age: 66
End: 2021-02-11

## 2021-02-24 ENCOUNTER — TREATMENT (OUTPATIENT)
Dept: CARDIAC REHAB | Facility: HOSPITAL | Age: 66
End: 2021-02-24

## 2021-02-24 DIAGNOSIS — I21.4 NSTEMI, INITIAL EPISODE OF CARE (HCC): Primary | ICD-10-CM

## 2021-02-24 DIAGNOSIS — Z95.5 S/P DRUG ELUTING CORONARY STENT PLACEMENT: ICD-10-CM

## 2021-02-24 PROCEDURE — 93798 PHYS/QHP OP CAR RHAB W/ECG: CPT

## 2021-03-01 ENCOUNTER — TREATMENT (OUTPATIENT)
Dept: CARDIAC REHAB | Facility: HOSPITAL | Age: 66
End: 2021-03-01

## 2021-03-01 DIAGNOSIS — I21.4 NSTEMI, INITIAL EPISODE OF CARE (HCC): Primary | ICD-10-CM

## 2021-03-01 PROCEDURE — 93798 PHYS/QHP OP CAR RHAB W/ECG: CPT

## 2021-03-03 ENCOUNTER — TREATMENT (OUTPATIENT)
Dept: CARDIAC REHAB | Facility: HOSPITAL | Age: 66
End: 2021-03-03

## 2021-03-03 DIAGNOSIS — I21.4 NSTEMI, INITIAL EPISODE OF CARE (HCC): Primary | ICD-10-CM

## 2021-03-03 DIAGNOSIS — Z95.5 S/P DRUG ELUTING CORONARY STENT PLACEMENT: ICD-10-CM

## 2021-03-03 PROCEDURE — 93798 PHYS/QHP OP CAR RHAB W/ECG: CPT

## 2021-03-05 ENCOUNTER — TREATMENT (OUTPATIENT)
Dept: CARDIAC REHAB | Facility: HOSPITAL | Age: 66
End: 2021-03-05

## 2021-03-05 DIAGNOSIS — I21.4 NSTEMI, INITIAL EPISODE OF CARE (HCC): Primary | ICD-10-CM

## 2021-03-05 PROCEDURE — 93798 PHYS/QHP OP CAR RHAB W/ECG: CPT

## 2021-03-08 ENCOUNTER — APPOINTMENT (OUTPATIENT)
Dept: CARDIAC REHAB | Facility: HOSPITAL | Age: 66
End: 2021-03-08

## 2021-03-10 ENCOUNTER — APPOINTMENT (OUTPATIENT)
Dept: CARDIAC REHAB | Facility: HOSPITAL | Age: 66
End: 2021-03-10

## 2021-03-12 ENCOUNTER — APPOINTMENT (OUTPATIENT)
Dept: CARDIAC REHAB | Facility: HOSPITAL | Age: 66
End: 2021-03-12

## 2021-03-15 ENCOUNTER — APPOINTMENT (OUTPATIENT)
Dept: CARDIAC REHAB | Facility: HOSPITAL | Age: 66
End: 2021-03-15

## 2021-03-17 ENCOUNTER — APPOINTMENT (OUTPATIENT)
Dept: CARDIAC REHAB | Facility: HOSPITAL | Age: 66
End: 2021-03-17

## 2021-03-19 ENCOUNTER — APPOINTMENT (OUTPATIENT)
Dept: CARDIAC REHAB | Facility: HOSPITAL | Age: 66
End: 2021-03-19

## 2021-03-23 ENCOUNTER — APPOINTMENT (OUTPATIENT)
Dept: WOMENS IMAGING | Facility: HOSPITAL | Age: 66
End: 2021-03-23

## 2021-03-23 ENCOUNTER — PROCEDURE VISIT (OUTPATIENT)
Dept: OBSTETRICS AND GYNECOLOGY | Facility: CLINIC | Age: 66
End: 2021-03-23

## 2021-03-23 ENCOUNTER — OFFICE VISIT (OUTPATIENT)
Dept: OBSTETRICS AND GYNECOLOGY | Facility: CLINIC | Age: 66
End: 2021-03-23

## 2021-03-23 VITALS
WEIGHT: 130 LBS | HEIGHT: 59 IN | SYSTOLIC BLOOD PRESSURE: 138 MMHG | BODY MASS INDEX: 26.21 KG/M2 | DIASTOLIC BLOOD PRESSURE: 70 MMHG

## 2021-03-23 DIAGNOSIS — Z01.419 WELL WOMAN EXAM: Primary | ICD-10-CM

## 2021-03-23 DIAGNOSIS — Z78.0 ASYMPTOMATIC MENOPAUSAL STATE: ICD-10-CM

## 2021-03-23 DIAGNOSIS — Z12.31 ENCOUNTER FOR SCREENING MAMMOGRAM FOR MALIGNANT NEOPLASM OF BREAST: ICD-10-CM

## 2021-03-23 DIAGNOSIS — N95.2 VAGINAL ATROPHY: ICD-10-CM

## 2021-03-23 DIAGNOSIS — Z13.820 OSTEOPOROSIS SCREENING: ICD-10-CM

## 2021-03-23 DIAGNOSIS — Z12.31 VISIT FOR SCREENING MAMMOGRAM: Primary | ICD-10-CM

## 2021-03-23 DIAGNOSIS — N89.8 VAGINAL ODOR: ICD-10-CM

## 2021-03-23 PROCEDURE — G0101 CA SCREEN;PELVIC/BREAST EXAM: HCPCS | Performed by: STUDENT IN AN ORGANIZED HEALTH CARE EDUCATION/TRAINING PROGRAM

## 2021-03-23 PROCEDURE — 77067 SCR MAMMO BI INCL CAD: CPT | Performed by: RADIOLOGY

## 2021-03-23 PROCEDURE — 77063 BREAST TOMOSYNTHESIS BI: CPT | Performed by: OBSTETRICS & GYNECOLOGY

## 2021-03-23 PROCEDURE — 99213 OFFICE O/P EST LOW 20 MIN: CPT | Performed by: STUDENT IN AN ORGANIZED HEALTH CARE EDUCATION/TRAINING PROGRAM

## 2021-03-23 PROCEDURE — 77067 SCR MAMMO BI INCL CAD: CPT | Performed by: OBSTETRICS & GYNECOLOGY

## 2021-03-23 PROCEDURE — 77063 BREAST TOMOSYNTHESIS BI: CPT | Performed by: RADIOLOGY

## 2021-03-23 RX ORDER — CONJUGATED ESTROGENS 0.62 MG/G
CREAM VAGINAL 2 TIMES WEEKLY
Qty: 30 G | Refills: 1 | Status: SHIPPED | OUTPATIENT
Start: 2021-03-25 | End: 2021-07-15

## 2021-03-25 ENCOUNTER — OFFICE VISIT (OUTPATIENT)
Dept: CARDIOLOGY | Facility: CLINIC | Age: 66
End: 2021-03-25

## 2021-03-25 VITALS
SYSTOLIC BLOOD PRESSURE: 130 MMHG | DIASTOLIC BLOOD PRESSURE: 80 MMHG | HEART RATE: 76 BPM | WEIGHT: 133.2 LBS | BODY MASS INDEX: 26.85 KG/M2 | HEIGHT: 59 IN

## 2021-03-25 DIAGNOSIS — R00.2 PALPITATIONS: ICD-10-CM

## 2021-03-25 DIAGNOSIS — I10 ESSENTIAL HYPERTENSION: ICD-10-CM

## 2021-03-25 DIAGNOSIS — I25.118 CORONARY ARTERY DISEASE OF NATIVE ARTERY OF NATIVE HEART WITH STABLE ANGINA PECTORIS (HCC): Primary | ICD-10-CM

## 2021-03-25 DIAGNOSIS — E78.2 MIXED HYPERLIPIDEMIA: ICD-10-CM

## 2021-03-25 LAB
A VAGINAE DNA VAG QL NAA+PROBE: NORMAL SCORE
BVAB2 DNA VAG QL NAA+PROBE: NORMAL SCORE
C ALBICANS DNA VAG QL NAA+PROBE: NEGATIVE
C GLABRATA DNA VAG QL NAA+PROBE: NEGATIVE
MEGA1 DNA VAG QL NAA+PROBE: NORMAL SCORE

## 2021-03-25 PROCEDURE — 99214 OFFICE O/P EST MOD 30 MIN: CPT | Performed by: INTERNAL MEDICINE

## 2021-03-25 PROCEDURE — 93000 ELECTROCARDIOGRAM COMPLETE: CPT | Performed by: INTERNAL MEDICINE

## 2021-03-25 RX ORDER — ISOSORBIDE MONONITRATE 30 MG/1
30 TABLET, EXTENDED RELEASE ORAL DAILY
Qty: 30 TABLET | Refills: 0 | Status: SHIPPED | OUTPATIENT
Start: 2021-03-25 | End: 2021-05-17

## 2021-03-25 NOTE — PROGRESS NOTES
[facility] New Patient Office Note     Encounter Date:21  Patient:Domitila Paiz  :1955  MRN:[id number]      Chief Complaint:   Chief Complaint   Patient presents with   • Coronary Artery Disease     3 month f/u   • Hypertension   • Hyperlipidemia     History of Presenting Illness:      Ms. Paiz is a 65 y.o. woman past medical history notable for coronary artery disease status post percutaneous intervention, hypertension, mixed hyperlipidemia, and chronic back pain related orthopedic issues status post surgeries who presents to our office for follow up.  She was last seen by one of our nurse practitioners Stefania Bey and I did review her last office visit note.  She was having new issues with palpitations and a Holter monitor was performed.  Since her last appointment patient is doing about the same.  She does state that she is having more episodes of palpitations.  These are associated with chest pain.  They occur about twice a week.  She did have a Holter monitor performed after her last visit to assess this but did not really capture anything.  She does state that they seem to be worse now and more frequent.  She is doing cardiac rehab which is helping her breathing is improving.  She does have issues with low blood sugars at cardiac rehab.  Additionally there were issues with low blood pressure on her prior blood pressure regimen and her carvedilol was decreased and amlodipine was stopped.  Fortunately now it seems like she will have higher blood pressures at home.  She will have blood pressures in the 150s at home even as high as 160s.  She does not have any further issues with low blood pressure.      Review of Systems:  Review of Systems   Constitutional: Positive for malaise/fatigue.   HENT: Negative.    Eyes: Negative.    Cardiovascular: Positive for chest pain, dyspnea on exertion, leg swelling and palpitations.   Respiratory: Positive for shortness of breath.    Endocrine:  Negative.    Hematologic/Lymphatic: Negative.    Skin: Negative.    Musculoskeletal: Negative.    Gastrointestinal: Negative.    Genitourinary: Negative.    Neurological: Negative.    Psychiatric/Behavioral: Negative.    Allergic/Immunologic: Negative.        Current Outpatient Medications on File Prior to Visit   Medication Sig Dispense Refill   • albuterol sulfate  (90 Base) MCG/ACT inhaler Inhale 2 puffs.     • aspirin 81 MG EC tablet Take 81 mg by mouth Daily.     • carvedilol (COREG) 6.25 MG tablet Take 1 tablet by mouth 2 (Two) Times a Day With Meals.     • conjugated estrogens (Premarin) 0.625 MG/GM vaginal cream Insert  into the vagina 2 (Two) Times a Week. Use pea size amount. 30 g 1   • doxycycline (ADOXA) 100 MG tablet Take 100 mg by mouth As Needed.     • ezetimibe (ZETIA) 10 MG tablet Take 10 mg by mouth Daily.     • Fluticasone-Umeclidin-Vilant (Trelegy Ellipta) 200-62.5-25 MCG/INH aerosol powder  Inhale 1 puff Daily.     • HYDROcodone-acetaminophen (NORCO) 5-325 MG per tablet Take 1 tablet by mouth Every 6 (Six) Hours As Needed.     • multivitamin with minerals (MULTIPLE VITAMINS-MINERALS PO) Take 1 tablet by mouth Daily.     • nitroglycerin (NITROSTAT) 0.4 MG SL tablet Place 0.4 mg under the tongue.     • omeprazole (priLOSEC) 20 MG capsule Take 20 mg by mouth Daily.     • prasugrel (EFFIENT) 10 MG tablet Take 10 mg by mouth Daily.     • rizatriptan (MAXALT) 10 MG tablet Take 10 mg by mouth 1 (One) Time As Needed for Migraine. May repeat in 2 hours if needed     • rosuvastatin (CRESTOR) 20 MG tablet Take 20 mg by mouth Daily.     • valsartan (DIOVAN) 40 MG tablet Take 40 mg by mouth Daily.       No current facility-administered medications on file prior to visit.         Allergies   Allergen Reactions   • Wasp Venom Anaphylaxis   • Morphine Other (See Comments) and Hives     Pt states she almost od'd in the hosp on this years ago   • Poison Ivy Extract Hives   • Butorphanol Itching, Rash and  "Hives       Past Medical History:   Diagnosis Date   • Heart attack (CMS/HCC)    • Hypertension        Past Surgical History:   Procedure Laterality Date   • CARDIAC SURGERY     • COLONOSCOPY  2012    Dr. Lam   • HYSTERECTOMY         Social History     Socioeconomic History   • Marital status:      Spouse name: Not on file   • Number of children: Not on file   • Years of education: Not on file   • Highest education level: Not on file   Tobacco Use   • Smoking status: Never Smoker   • Smokeless tobacco: Never Used   Substance and Sexual Activity   • Alcohol use: No     Comment: caffeine use    • Drug use: No   • Sexual activity: Yes     Partners: Male       Family History   Problem Relation Age of Onset   • Hypertension Mother        The following portions of the patient's history were reviewed and updated as appropriate: allergies, current medications, past family history, past medical history, past social history, past surgical history and problem list.       Objective:       Vitals:    03/25/21 0805   BP: 130/80   BP Location: Left arm   Patient Position: Sitting   Pulse: 76   Weight: 60.4 kg (133 lb 3.2 oz)   Height: 149.9 cm (59.02\")     Body mass index is 26.88 kg/m².     Physical Exam:  Constitutional: Well appearing, well developed, no acute distress   HENT: Oropharynx clear and membrane moist  Eyes: Normal conjunctiva, no sclera icterus.  Neck: Supple, no carotid bruit bilaterally.  Cardiovascular: Regular rate and rhythm, No Murmur, No bilateral lower extremity edema.  Pulmonary: Normal respiratory effort, normal lung sounds, no wheezing.  Abdominal: Soft, nontender, no hepatosplenomegaly, liver is non-pulsatile.  Neurological: Alert and orient x 3.   Skin: Warm, dry, no ecchymosis, no rash.  Psych: Appropriate mood and affect. Normal judgment and insight.      Lab Results   Component Value Date    GLUCOSE 83 12/10/2020    BUN 15 01/22/2021    CREATININE 0.6 (L) 01/22/2021    EGFRIFNONA 84 " 12/10/2020    BCR 23.0 (H) 01/22/2021    K 4.1 01/22/2021    CO2 27 01/22/2021    CALCIUM 8.9 01/22/2021    ALBUMIN 4.0 01/22/2021    LABIL2 1.5 01/22/2021    AST 24 01/22/2021    ALT 22 01/22/2021       Lab Results   Component Value Date    WBC 5.28 01/26/2021    HGB 9.6 (L) 01/26/2021    HCT 33.0 (L) 01/26/2021    MCV 82.5 01/26/2021     01/26/2021       Lab Results   Component Value Date    TROPONINI 0.092 (H) 11/02/2020    TROPONINT <0.010 11/06/2020       Lab Results   Component Value Date    CHOL 111 02/05/2021    CHLPL 214 (H) 03/14/2019    CHLPL 208 (H) 07/13/2018    CHLPL 200 (H) 06/20/2018     Lab Results   Component Value Date    TRIG 107 02/05/2021    TRIG 60 03/14/2019    TRIG 87 07/13/2018     Lab Results   Component Value Date    HDL 58 02/05/2021    HDL 69 03/14/2019    HDL 65 07/13/2018     Lab Results   Component Value Date    LDL 33 02/05/2021     (H) 03/14/2019     (H) 07/13/2018       Lab Results   Component Value Date    TSH 0.878 11/18/2020         ECG 12 Lead    Date/Time: 3/25/2021 8:17 AM  Performed by: Carlos A Vasques MD  Authorized by: Carlos A Vasques MD   Comparison: compared with previous ECG from 2/5/2021  Similar to previous ECG  Rhythm: sinus rhythm    Clinical impression: normal ECG        Holter Monitor 2/11/2021:  · A normal monitor study.  · Underlying heart rhythm was sinus rhythm with an average heart rate of 79 bpm and a range of 55 bpm up to 110 bpm  · No diary submitted or symptoms reported during study    Cardiac catheterization 11/3/2020:  · Left main: This gives rise to the LAD and left circumflex. The left main is free of disease.  · LAD: This gives rise to a single diagonal fairly distally. It terminates at the apex. The stent in the proximal vessel is widely patent. Just prior to the diagonal branch there is a focal somewhat hazy 75% stenosis focally  · LCx: This gives rise to 2 moderate marginal is a small terminal marginal. The left  circumflex is free of disease.  · RCA: This is dominant giving rise to the PDA and a posterolateral left ventricular branch both of which are large. These extend out to the ape with up to 20% narrowing in the ostium of the PDA x. The right coronary has luminal irregularity  · Successful percutaneous intervention to mid LAD with placement of 2.25 x 12 mm resolute Kt drug-eluting stent    Cardiac catheterization 10/30/2020:  · No change from cardiac catheterization earlier in the day    Cardiac catheterization 10/30/2020:  · Left Main: The left main is a large caliber vessel with a normal take off from the left coronary cusp that bifurcates to form a left anterior descending artery and a left circumflex artery. There is no angiographically significant coronary artery disease noted.  · Left anterior descending artery:The LAD is a medium caliber vessel. There is a non-calcified underfilled segment proximally followed by sequential calcified lesions 50-75% with a focal mid >75% lesion. There is a focal mid calcified 50% stenosis. There is a small caliber high takeoff diagonal and a moderate sized mid/distal takeoff diagonal 2 with no significant disease  · Left circumflex artery: The circumflex is a medium caliber, non-dominant vessel. There is a mid trifurcation where OM1 and OM2 takeoff from a small caliber AV groove branch. There is a motion artifact mid OM1/2 that is uninterpretable, these are moderate caliber vessels with no significant disease  · Right coronary artery: The RCA is a large caliber, dominant vessel. It has a normal takeoff from the right coronary cusp. The RCA terminates as a PDA and right posterolateral branch. There are focal <25% calcified stenoses in the mid RCA  · Left Ventricle: The ventricular cavity size is within normal limits. There are no stigmata of prior infarction. There is no abnormal filling defect. LVEF is estimated at 55%.  · Successful implantation of a 2.5 x18 mm resolute Kt  drug-eluting stent to proximal LAD stenoses    Cardiac CTA/16/2020:  · Total calcium score 125.   · Normal coronary origins and courses.    · Severe proximal LAD disease with sequential lesions, maximum >75%        Assessment:          Diagnosis Plan   1. Coronary artery disease of native artery of native heart with stable angina pectoris (CMS/HCC)  ECG 12 Lead    Adult Transthoracic Echo Complete W/ Cont if Necessary Per Protocol    Cardiac Event Monitor   2. Mixed hyperlipidemia     3. Essential hypertension     4. Palpitations  Adult Transthoracic Echo Complete W/ Cont if Necessary Per Protocol    Cardiac Event Monitor          Plan:       Ms. Paiz is a 65 y.o. woman past medical history notable for coronary artery disease status post percutaneous intervention, hypertension, mixed hyperlipidemia, and chronic back pain related orthopedic issues status post surgeries who presents to our office for scheduled follow-up.  Overall she is doing about the same.  Her EKG is unchanged but symptoms are the same if not a little worse.  I would like to try low-dose Imdur to see if this will help out with controlling her blood pressure a little bit better as well as some of her chest pains.  If she fails this then I would try Ranexa.  If after trying further medical therapy then I think we need to consider a repeat cardiac catheterization to ensure that were not missing any thing from a stent issue.  She has not had an echocardiogram and I would like to check that to make sure that were not missing any structural abnormalities which could be causing her chest pain or palpitations.  I would also like to get a longer-term monitor to better assess for any significant arrhythmia which may be causing some of her symptoms as we were not able to capture any arrhythmia on a 48-hour Holter    Coronary artery disease with stable angina:  · Will continue dual antiplatelet therapy  · Continue carvedilol 6.25 mg BID  · Continue high  potency statin  · Had a edema and low BP with amlodipine  · Will try long acting nitrates and if issues then would try ranexa     Hypertension:  · Blood pressure well controlled continue with current therapy    Mixed Hyperlipidemia:  · Continue statin as LDL prior to starting statin therapy  · Lipid panel 2/2021 demonstrates good LDL and total cholesterol  · CMP 1/2021 demonstrates normal ALT and AST    Palpitations:  · Follow-up 2-week event monitor  · Follow-up echocardiogram    Follow Up:  2 Months      Thank you for allowing me to participate in the care of Domitila Paiz. Feel free to contact me directly with any further questions or concerns.    Carlos A Vasques MD  [facility]  03/25/21  08:39 EDT

## 2021-04-07 ENCOUNTER — APPOINTMENT (OUTPATIENT)
Dept: BONE DENSITY | Facility: HOSPITAL | Age: 66
End: 2021-04-07

## 2021-04-20 ENCOUNTER — HOSPITAL ENCOUNTER (OUTPATIENT)
Dept: CARDIOLOGY | Facility: HOSPITAL | Age: 66
Discharge: HOME | End: 2021-04-20
Admitting: INTERNAL MEDICINE

## 2021-04-20 DIAGNOSIS — I25.118 CORONARY ARTERY DISEASE OF NATIVE ARTERY OF NATIVE HEART WITH STABLE ANGINA PECTORIS (HCC): ICD-10-CM

## 2021-04-20 DIAGNOSIS — R00.2 PALPITATIONS: ICD-10-CM

## 2021-04-20 PROCEDURE — 93306 TTE W/DOPPLER COMPLETE: CPT

## 2021-04-20 PROCEDURE — 93306 TTE W/DOPPLER COMPLETE: CPT | Performed by: INTERNAL MEDICINE

## 2021-04-21 LAB
AORTIC ARCH: 2.1 CM
ASCENDING AORTA: 3.5 CM
BH CV ECHO MEAS - ACS: 2 CM
BH CV ECHO MEAS - AO MAX PG (FULL): 4.3 MMHG
BH CV ECHO MEAS - AO MAX PG: 6.7 MMHG
BH CV ECHO MEAS - AO MEAN PG (FULL): 3 MMHG
BH CV ECHO MEAS - AO MEAN PG: 4 MMHG
BH CV ECHO MEAS - AO ROOT AREA (BSA CORRECTED): 1.9
BH CV ECHO MEAS - AO ROOT AREA: 7.1 CM^2
BH CV ECHO MEAS - AO ROOT DIAM: 3 CM
BH CV ECHO MEAS - AO V2 MAX: 129 CM/SEC
BH CV ECHO MEAS - AO V2 MEAN: 92.8 CM/SEC
BH CV ECHO MEAS - AO V2 VTI: 27.7 CM
BH CV ECHO MEAS - ASC AORTA: 3.5 CM
BH CV ECHO MEAS - AVA(I,A): 1.6 CM^2
BH CV ECHO MEAS - AVA(I,D): 1.6 CM^2
BH CV ECHO MEAS - AVA(V,A): 1.7 CM^2
BH CV ECHO MEAS - AVA(V,D): 1.7 CM^2
BH CV ECHO MEAS - BSA(HAYCOCK): 1.6 M^2
BH CV ECHO MEAS - BSA: 1.6 M^2
BH CV ECHO MEAS - BZI_BMI: 26.9 KILOGRAMS/M^2
BH CV ECHO MEAS - BZI_METRIC_HEIGHT: 149.9 CM
BH CV ECHO MEAS - BZI_METRIC_WEIGHT: 60.3 KG
BH CV ECHO MEAS - EDV(MOD-SP2): 54 ML
BH CV ECHO MEAS - EDV(MOD-SP4): 68 ML
BH CV ECHO MEAS - EDV(TEICH): 78.6 ML
BH CV ECHO MEAS - EF(CUBED): 67.1 %
BH CV ECHO MEAS - EF(MOD-BP): 58.9 %
BH CV ECHO MEAS - EF(MOD-SP2): 57.4 %
BH CV ECHO MEAS - EF(MOD-SP4): 58.8 %
BH CV ECHO MEAS - EF(TEICH): 59 %
BH CV ECHO MEAS - ESV(MOD-SP2): 23 ML
BH CV ECHO MEAS - ESV(MOD-SP4): 28 ML
BH CV ECHO MEAS - ESV(TEICH): 32.2 ML
BH CV ECHO MEAS - FS: 31 %
BH CV ECHO MEAS - IVS/LVPW: 1
BH CV ECHO MEAS - IVSD: 1.2 CM
BH CV ECHO MEAS - LAT PEAK E' VEL: 8.5 CM/SEC
BH CV ECHO MEAS - LV DIASTOLIC VOL/BSA (35-75): 43.9 ML/M^2
BH CV ECHO MEAS - LV MASS(C)D: 178.2 GRAMS
BH CV ECHO MEAS - LV MASS(C)DI: 114.9 GRAMS/M^2
BH CV ECHO MEAS - LV MAX PG: 2.4 MMHG
BH CV ECHO MEAS - LV MEAN PG: 1 MMHG
BH CV ECHO MEAS - LV SYSTOLIC VOL/BSA (12-30): 18.1 ML/M^2
BH CV ECHO MEAS - LV V1 MAX: 77 CM/SEC
BH CV ECHO MEAS - LV V1 MEAN: 54.1 CM/SEC
BH CV ECHO MEAS - LV V1 VTI: 16 CM
BH CV ECHO MEAS - LVIDD: 4.2 CM
BH CV ECHO MEAS - LVIDS: 2.9 CM
BH CV ECHO MEAS - LVLD AP2: 5.9 CM
BH CV ECHO MEAS - LVLD AP4: 6.2 CM
BH CV ECHO MEAS - LVLS AP2: 5.4 CM
BH CV ECHO MEAS - LVLS AP4: 5.4 CM
BH CV ECHO MEAS - LVOT AREA (M): 2.8 CM^2
BH CV ECHO MEAS - LVOT AREA: 2.8 CM^2
BH CV ECHO MEAS - LVOT DIAM: 1.9 CM
BH CV ECHO MEAS - LVPWD: 1.2 CM
BH CV ECHO MEAS - MED PEAK E' VEL: 7 CM/SEC
BH CV ECHO MEAS - MR MAX PG: 18.7 MMHG
BH CV ECHO MEAS - MR MAX VEL: 216 CM/SEC
BH CV ECHO MEAS - MV A DUR: 0.14 SEC
BH CV ECHO MEAS - MV A MAX VEL: 91.7 CM/SEC
BH CV ECHO MEAS - MV DEC SLOPE: 215 CM/SEC^2
BH CV ECHO MEAS - MV DEC TIME: 0.25 SEC
BH CV ECHO MEAS - MV E MAX VEL: 61.7 CM/SEC
BH CV ECHO MEAS - MV E/A: 0.67
BH CV ECHO MEAS - MV MAX PG: 4.2 MMHG
BH CV ECHO MEAS - MV MEAN PG: 2 MMHG
BH CV ECHO MEAS - MV P1/2T MAX VEL: 77.4 CM/SEC
BH CV ECHO MEAS - MV P1/2T: 105.4 MSEC
BH CV ECHO MEAS - MV V2 MAX: 102 CM/SEC
BH CV ECHO MEAS - MV V2 MEAN: 60.3 CM/SEC
BH CV ECHO MEAS - MV V2 VTI: 27.1 CM
BH CV ECHO MEAS - MVA P1/2T LCG: 2.8 CM^2
BH CV ECHO MEAS - MVA(P1/2T): 2.1 CM^2
BH CV ECHO MEAS - MVA(VTI): 1.7 CM^2
BH CV ECHO MEAS - PA ACC TIME: 0.15 SEC
BH CV ECHO MEAS - PA MAX PG (FULL): 1.1 MMHG
BH CV ECHO MEAS - PA MAX PG: 2.4 MMHG
BH CV ECHO MEAS - PA PR(ACCEL): 12.4 MMHG
BH CV ECHO MEAS - PA V2 MAX: 77.4 CM/SEC
BH CV ECHO MEAS - PULM A REVS DUR: 0.11 SEC
BH CV ECHO MEAS - PULM A REVS VEL: 24 CM/SEC
BH CV ECHO MEAS - PULM DIAS VEL: 32.6 CM/SEC
BH CV ECHO MEAS - PULM S/D: 1.5
BH CV ECHO MEAS - PULM SYS VEL: 48.4 CM/SEC
BH CV ECHO MEAS - PVA(V,A): 2.1 CM^2
BH CV ECHO MEAS - PVA(V,D): 2.1 CM^2
BH CV ECHO MEAS - QP/QS: 0.86
BH CV ECHO MEAS - RAP SYSTOLE: 3 MMHG
BH CV ECHO MEAS - RV MAX PG: 1.3 MMHG
BH CV ECHO MEAS - RV MEAN PG: 1 MMHG
BH CV ECHO MEAS - RV V1 MAX: 56.8 CM/SEC
BH CV ECHO MEAS - RV V1 MEAN: 38.8 CM/SEC
BH CV ECHO MEAS - RV V1 VTI: 13.7 CM
BH CV ECHO MEAS - RVOT AREA: 2.8 CM^2
BH CV ECHO MEAS - RVOT DIAM: 1.9 CM
BH CV ECHO MEAS - RVSP: 25 MMHG
BH CV ECHO MEAS - SI(AO): 126.3 ML/M^2
BH CV ECHO MEAS - SI(CUBED): 32.1 ML/M^2
BH CV ECHO MEAS - SI(LVOT): 29.3 ML/M^2
BH CV ECHO MEAS - SI(MOD-SP2): 20 ML/M^2
BH CV ECHO MEAS - SI(MOD-SP4): 25.8 ML/M^2
BH CV ECHO MEAS - SI(TEICH): 29.9 ML/M^2
BH CV ECHO MEAS - SUP REN AO DIAM: 2.2 CM
BH CV ECHO MEAS - SV(AO): 195.8 ML
BH CV ECHO MEAS - SV(CUBED): 49.7 ML
BH CV ECHO MEAS - SV(LVOT): 45.4 ML
BH CV ECHO MEAS - SV(MOD-SP2): 31 ML
BH CV ECHO MEAS - SV(MOD-SP4): 40 ML
BH CV ECHO MEAS - SV(RVOT): 38.8 ML
BH CV ECHO MEAS - SV(TEICH): 46.4 ML
BH CV ECHO MEAS - TAPSE (>1.6): 2.2 CM
BH CV ECHO MEAS - TR MAX VEL: 233 CM/SEC
BH CV ECHO MEASUREMENTS AVERAGE E/E' RATIO: 7.96
BH CV XLRA - RV BASE: 2.5 CM
BH CV XLRA - RV LENGTH: 6.1 CM
BH CV XLRA - RV MID: 2.7 CM
BH CV XLRA - TDI S': 14.3 CM/SEC
LEFT ATRIUM VOLUME INDEX: 23 ML/M2
MAXIMAL PREDICTED HEART RATE: 155 BPM
SINUS: 2.8 CM
STJ: 2.8 CM
STRESS TARGET HR: 132 BPM

## 2021-04-28 ENCOUNTER — TELEPHONE (OUTPATIENT)
Dept: CARDIOLOGY | Facility: CLINIC | Age: 66
End: 2021-04-28

## 2021-04-28 RX ORDER — CARVEDILOL 12.5 MG/1
12.5 TABLET ORAL 2 TIMES DAILY WITH MEALS
Qty: 180 TABLET | Refills: 3 | Status: SHIPPED | OUTPATIENT
Start: 2021-04-28 | End: 2021-05-06

## 2021-04-28 RX ORDER — CLOPIDOGREL BISULFATE 75 MG/1
75 TABLET ORAL DAILY
Qty: 90 TABLET | Refills: 3 | Status: SHIPPED | OUTPATIENT
Start: 2021-04-28 | End: 2021-06-03 | Stop reason: SDUPTHER

## 2021-04-28 NOTE — TELEPHONE ENCOUNTER
Called patient regarding atrial fibrillation being found on event monitor.  Patient does have an elevated risk for stroke due to age, gender, and hypertension.  I have recommended starting anticoagulation and she will start Eliquis 5 mg twice daily.  She will stop her aspirin and Effient and will start clopidogrel.  She is fairly symptomatic and I have had her increase her carvedilol from 6.25 mg up to 12.5 mg.  Will monitor her response to this and decide if any further agents are needed to help with rate/rhythm control.    She was needing to change her appointment and I told her she can move up her appointment a little bit given that we now know what we are dealing with on her event monitor.

## 2021-05-03 ENCOUNTER — APPOINTMENT (OUTPATIENT)
Dept: CT IMAGING | Facility: HOSPITAL | Age: 66
End: 2021-05-03

## 2021-05-03 ENCOUNTER — APPOINTMENT (OUTPATIENT)
Dept: GENERAL RADIOLOGY | Facility: HOSPITAL | Age: 66
End: 2021-05-03

## 2021-05-03 ENCOUNTER — HOSPITAL ENCOUNTER (EMERGENCY)
Facility: HOSPITAL | Age: 66
Discharge: HOME | End: 2021-05-03
Attending: EMERGENCY MEDICINE | Admitting: EMERGENCY MEDICINE

## 2021-05-03 ENCOUNTER — TELEPHONE (OUTPATIENT)
Dept: CARDIOLOGY | Facility: CLINIC | Age: 66
End: 2021-05-03

## 2021-05-03 VITALS
TEMPERATURE: 98.3 F | BODY MASS INDEX: 26.9 KG/M2 | DIASTOLIC BLOOD PRESSURE: 61 MMHG | OXYGEN SATURATION: 97 % | SYSTOLIC BLOOD PRESSURE: 115 MMHG | HEIGHT: 59 IN | RESPIRATION RATE: 17 BRPM | HEART RATE: 66 BPM

## 2021-05-03 DIAGNOSIS — R07.89 ATYPICAL CHEST PAIN: Primary | ICD-10-CM

## 2021-05-03 DIAGNOSIS — R41.0 CONFUSION: ICD-10-CM

## 2021-05-03 LAB
ALBUMIN SERPL-MCNC: 4.2 G/DL (ref 3.5–5.2)
ALBUMIN/GLOB SERPL: 1.6 G/DL
ALP SERPL-CCNC: 83 U/L (ref 39–117)
ALT SERPL W P-5'-P-CCNC: 20 U/L (ref 1–33)
ANION GAP SERPL CALCULATED.3IONS-SCNC: 9.8 MMOL/L (ref 5–15)
AST SERPL-CCNC: 22 U/L (ref 1–32)
BASOPHILS # BLD AUTO: 0.07 10*3/MM3 (ref 0–0.2)
BASOPHILS NFR BLD AUTO: 1 % (ref 0–1.5)
BILIRUB SERPL-MCNC: 0.2 MG/DL (ref 0–1.2)
BUN SERPL-MCNC: 13 MG/DL (ref 8–23)
BUN/CREAT SERPL: 14.6 (ref 7–25)
CALCIUM SPEC-SCNC: 8.9 MG/DL (ref 8.6–10.5)
CHLORIDE SERPL-SCNC: 103 MMOL/L (ref 98–107)
CO2 SERPL-SCNC: 26.2 MMOL/L (ref 22–29)
CREAT SERPL-MCNC: 0.89 MG/DL (ref 0.57–1)
D DIMER PPP FEU-MCNC: 0.49 MCGFEU/ML (ref 0–0.49)
DEPRECATED RDW RBC AUTO: 45.6 FL (ref 37–54)
EOSINOPHIL # BLD AUTO: 0.24 10*3/MM3 (ref 0–0.4)
EOSINOPHIL NFR BLD AUTO: 3.6 % (ref 0.3–6.2)
ERYTHROCYTE [DISTWIDTH] IN BLOOD BY AUTOMATED COUNT: 16.4 % (ref 12.3–15.4)
GFR SERPL CREATININE-BSD FRML MDRD: 64 ML/MIN/1.73
GLOBULIN UR ELPH-MCNC: 2.6 GM/DL
GLUCOSE SERPL-MCNC: 127 MG/DL (ref 65–99)
HCT VFR BLD AUTO: 34.2 % (ref 34–46.6)
HGB BLD-MCNC: 10.6 G/DL (ref 12–15.9)
HOLD SPECIMEN: NORMAL
HOLD SPECIMEN: NORMAL
IMM GRANULOCYTES # BLD AUTO: 0.04 10*3/MM3 (ref 0–0.05)
IMM GRANULOCYTES NFR BLD AUTO: 0.6 % (ref 0–0.5)
LIPASE SERPL-CCNC: 38 U/L (ref 13–60)
LYMPHOCYTES # BLD AUTO: 2.06 10*3/MM3 (ref 0.7–3.1)
LYMPHOCYTES NFR BLD AUTO: 30.6 % (ref 19.6–45.3)
MCH RBC QN AUTO: 24.3 PG (ref 26.6–33)
MCHC RBC AUTO-ENTMCNC: 31 G/DL (ref 31.5–35.7)
MCV RBC AUTO: 78.4 FL (ref 79–97)
MONOCYTES # BLD AUTO: 0.55 10*3/MM3 (ref 0.1–0.9)
MONOCYTES NFR BLD AUTO: 8.2 % (ref 5–12)
NEUTROPHILS NFR BLD AUTO: 3.78 10*3/MM3 (ref 1.7–7)
NEUTROPHILS NFR BLD AUTO: 56 % (ref 42.7–76)
NRBC BLD AUTO-RTO: 0 /100 WBC (ref 0–0.2)
PLATELET # BLD AUTO: 268 10*3/MM3 (ref 140–450)
PMV BLD AUTO: 9 FL (ref 6–12)
POTASSIUM SERPL-SCNC: 3.7 MMOL/L (ref 3.5–5.2)
PROT SERPL-MCNC: 6.8 G/DL (ref 6–8.5)
QT INTERVAL: 410 MS
RBC # BLD AUTO: 4.36 10*6/MM3 (ref 3.77–5.28)
SODIUM SERPL-SCNC: 139 MMOL/L (ref 136–145)
TROPONIN T SERPL-MCNC: <0.01 NG/ML (ref 0–0.03)
TROPONIN T SERPL-MCNC: <0.01 NG/ML (ref 0–0.03)
WBC # BLD AUTO: 6.74 10*3/MM3 (ref 3.4–10.8)
WHOLE BLOOD HOLD SPECIMEN: NORMAL
WHOLE BLOOD HOLD SPECIMEN: NORMAL

## 2021-05-03 PROCEDURE — 71046 X-RAY EXAM CHEST 2 VIEWS: CPT

## 2021-05-03 PROCEDURE — 85025 COMPLETE CBC W/AUTO DIFF WBC: CPT

## 2021-05-03 PROCEDURE — 84484 ASSAY OF TROPONIN QUANT: CPT | Performed by: EMERGENCY MEDICINE

## 2021-05-03 PROCEDURE — 80053 COMPREHEN METABOLIC PANEL: CPT | Performed by: EMERGENCY MEDICINE

## 2021-05-03 PROCEDURE — 93005 ELECTROCARDIOGRAM TRACING: CPT | Performed by: EMERGENCY MEDICINE

## 2021-05-03 PROCEDURE — 93010 ELECTROCARDIOGRAM REPORT: CPT | Performed by: INTERNAL MEDICINE

## 2021-05-03 PROCEDURE — 96375 TX/PRO/DX INJ NEW DRUG ADDON: CPT

## 2021-05-03 PROCEDURE — 25010000002 ONDANSETRON PER 1 MG: Performed by: EMERGENCY MEDICINE

## 2021-05-03 PROCEDURE — 85379 FIBRIN DEGRADATION QUANT: CPT | Performed by: EMERGENCY MEDICINE

## 2021-05-03 PROCEDURE — 25010000002 HYDROMORPHONE PER 4 MG: Performed by: EMERGENCY MEDICINE

## 2021-05-03 PROCEDURE — 99285 EMERGENCY DEPT VISIT HI MDM: CPT

## 2021-05-03 PROCEDURE — 96374 THER/PROPH/DIAG INJ IV PUSH: CPT

## 2021-05-03 PROCEDURE — 70450 CT HEAD/BRAIN W/O DYE: CPT

## 2021-05-03 PROCEDURE — 93005 ELECTROCARDIOGRAM TRACING: CPT

## 2021-05-03 PROCEDURE — 83690 ASSAY OF LIPASE: CPT | Performed by: EMERGENCY MEDICINE

## 2021-05-03 RX ORDER — SODIUM CHLORIDE 0.9 % (FLUSH) 0.9 %
10 SYRINGE (ML) INJECTION AS NEEDED
Status: DISCONTINUED | OUTPATIENT
Start: 2021-05-03 | End: 2021-05-03 | Stop reason: HOSPADM

## 2021-05-03 RX ORDER — HYDROCODONE BITARTRATE AND ACETAMINOPHEN 5; 325 MG/1; MG/1
1 TABLET ORAL EVERY 6 HOURS PRN
Qty: 5 TABLET | Refills: 0 | Status: SHIPPED | OUTPATIENT
Start: 2021-05-03 | End: 2021-07-15

## 2021-05-03 RX ORDER — HYDROMORPHONE HYDROCHLORIDE 1 MG/ML
0.5 INJECTION, SOLUTION INTRAMUSCULAR; INTRAVENOUS; SUBCUTANEOUS ONCE
Status: COMPLETED | OUTPATIENT
Start: 2021-05-03 | End: 2021-05-03

## 2021-05-03 RX ORDER — ONDANSETRON 2 MG/ML
4 INJECTION INTRAMUSCULAR; INTRAVENOUS ONCE
Status: COMPLETED | OUTPATIENT
Start: 2021-05-03 | End: 2021-05-03

## 2021-05-03 RX ADMIN — HYDROMORPHONE HYDROCHLORIDE 0.5 MG: 1 INJECTION, SOLUTION INTRAMUSCULAR; INTRAVENOUS; SUBCUTANEOUS at 14:50

## 2021-05-03 RX ADMIN — ONDANSETRON 4 MG: 2 INJECTION INTRAMUSCULAR; INTRAVENOUS at 14:49

## 2021-05-03 NOTE — ED NOTES
"Pt c/o \"severe chest pain\" that she describes as \"stabbing\" that started three days but worsened today. Pt sees Dr. Vasques after she had a MI. Pt also having shortness of breath.    Mask placed on patient in triage. Triage staff wore appropriate PPE during interaction with patient.        Ratna Robledo RN  05/03/21 [id number]    "

## 2021-05-03 NOTE — ED NOTES
This RN in appropriate ppe while in pt room. Pt wearing mask.        Julissa Rodriguez, RN  05/03/21 0773

## 2021-05-03 NOTE — TELEPHONE ENCOUNTER
Yes agree with plan if pain is escalating that fast and with the other symptoms described agree with ER evaluation in light on recently starting AC

## 2021-05-03 NOTE — ED PROVIDER NOTES
EMERGENCY DEPARTMENT ENCOUNTER    Room Number:  28/28  PCP: Melissa Billy APRN  Historian: Patient  History Limited By: Nothing      HPI  Chief Complaint: Chest pain  Context: Domitila Paiz is a 65 y.o. female who presents to the ED c/o chest pain.  Patient states pain started Friday.  Has been having intermittent chest pain since Friday.  Symptoms have gotten significantly worse over the last day or 2.  Patient states pain is sharp and stabbing.  States it lasts for a few minutes and then goes away.  States it is not exertional.  Has had no significant leg swelling.  Patient currently on anticoagulation.  Patient has had recent atrial fibrillation.  Had recent NSTEMI with stent placement.  Has had no vomiting or diarrhea.  She also had a fall and they think she is a little more confused.      Location: Center right chest  Radiation: None  Character: Sharp  Duration: 3 days  Severity: Moderate  Progression: Not improving  Aggravating Factors: Direct pressure and deep breathing  Alleviating Factors: Nothing        MEDICAL RECORD REVIEW    Patient followed by cardiology here and had a recent Holter that showed atrial fibrillation          PAST MEDICAL HISTORY  Active Ambulatory Problems     Diagnosis Date Noted   • Coronary artery disease of native artery of native heart with stable angina pectoris (CMS/HCC) 11/11/2020   • Mixed hyperlipidemia 11/11/2020   • Essential hypertension 11/11/2020   • Dizziness 02/05/2021   • Near syncope 02/05/2021   • Palpitations 02/05/2021     Resolved Ambulatory Problems     Diagnosis Date Noted   • No Resolved Ambulatory Problems     Past Medical History:   Diagnosis Date   • Heart attack (CMS/HCC)    • Hypertension          PAST SURGICAL HISTORY  Past Surgical History:   Procedure Laterality Date   • CARDIAC SURGERY     • COLONOSCOPY  2012    Dr. Lam   • HYSTERECTOMY           FAMILY HISTORY  Family History   Problem Relation Age of Onset   • Hypertension Mother           SOCIAL HISTORY  Social History     Socioeconomic History   • Marital status:      Spouse name: Not on file   • Number of children: Not on file   • Years of education: Not on file   • Highest education level: Not on file   Tobacco Use   • Smoking status: Never Smoker   • Smokeless tobacco: Never Used   Substance and Sexual Activity   • Alcohol use: No     Comment: caffeine use    • Drug use: No   • Sexual activity: Yes     Partners: Male         ALLERGIES  Wasp venom, Morphine, Poison ivy extract, and Butorphanol        REVIEW OF SYSTEMS  Review of Systems   Constitutional: Negative for fever.   HENT: Negative for sore throat.    Eyes: Negative.    Respiratory: Negative for cough and shortness of breath.    Cardiovascular: Positive for chest pain.   Gastrointestinal: Negative for abdominal pain, diarrhea and vomiting.   Genitourinary: Negative for dysuria.   Musculoskeletal: Negative for neck pain.   Skin: Negative for rash.   Allergic/Immunologic: Negative.    Neurological: Negative for weakness, numbness and headaches.   Hematological: Negative.    Psychiatric/Behavioral: Negative.    All other systems reviewed and are negative.           PHYSICAL EXAM  ED Triage Vitals   Temp Heart Rate Resp BP SpO2   05/03/21 1205 05/03/21 1205 05/03/21 1205 05/03/21 1220 05/03/21 1205   98.3 °F (36.8 °C) 78 15 160/77 96 %      Temp src Heart Rate Source Patient Position BP Location FiO2 (%)   05/03/21 1205 -- -- -- --   Tympanic           Physical Exam  Vitals and nursing note reviewed.   Constitutional:       General: She is not in acute distress.  HENT:      Head: Normocephalic and atraumatic.   Eyes:      Pupils: Pupils are equal, round, and reactive to light.   Cardiovascular:      Rate and Rhythm: Normal rate and regular rhythm.      Heart sounds: Normal heart sounds.   Pulmonary:      Effort: Pulmonary effort is normal. No respiratory distress.      Breath sounds: Normal breath sounds.   Chest:      Chest  wall: Tenderness present.   Abdominal:      Palpations: Abdomen is soft.      Tenderness: There is no abdominal tenderness. There is no guarding or rebound.   Musculoskeletal:         General: Normal range of motion.      Cervical back: Normal range of motion and neck supple.   Skin:     General: Skin is warm and dry.      Findings: No rash.   Neurological:      Mental Status: She is alert and oriented to person, place, and time.      Sensory: Sensation is intact.   Psychiatric:         Mood and Affect: Mood and affect normal.       Patient was wearing a face mask when I entered the room and they continued to wear a mask throughout their stay in the ED.  I wore PPE, including  gloves, face mask with shield or face mask with goggles whenever I was in the room with patient.       LAB RESULTS  Recent Results (from the past 24 hour(s))   ECG 12 Lead    Collection Time: 05/03/21 12:08 PM   Result Value Ref Range    QT Interval 410 ms   Comprehensive Metabolic Panel    Collection Time: 05/03/21 12:24 PM    Specimen: Blood   Result Value Ref Range    Glucose 127 (H) 65 - 99 mg/dL    BUN 13 8 - 23 mg/dL    Creatinine 0.89 0.57 - 1.00 mg/dL    Sodium 139 136 - 145 mmol/L    Potassium 3.7 3.5 - 5.2 mmol/L    Chloride 103 98 - 107 mmol/L    CO2 26.2 22.0 - 29.0 mmol/L    Calcium 8.9 8.6 - 10.5 mg/dL    Total Protein 6.8 6.0 - 8.5 g/dL    Albumin 4.20 3.50 - 5.20 g/dL    ALT (SGPT) 20 1 - 33 U/L    AST (SGOT) 22 1 - 32 U/L    Alkaline Phosphatase 83 39 - 117 U/L    Total Bilirubin 0.2 0.0 - 1.2 mg/dL    eGFR Non African Amer 64 >60 mL/min/1.73    Globulin 2.6 gm/dL    A/G Ratio 1.6 g/dL    BUN/Creatinine Ratio 14.6 7.0 - 25.0    Anion Gap 9.8 5.0 - 15.0 mmol/L   Troponin    Collection Time: 05/03/21 12:24 PM    Specimen: Blood   Result Value Ref Range    Troponin T <0.010 0.000 - 0.030 ng/mL   Light Blue Top    Collection Time: 05/03/21 12:24 PM   Result Value Ref Range    Extra Tube hold for add-on    Green Top (Gel)     Collection Time: 05/03/21 12:24 PM   Result Value Ref Range    Extra Tube Hold for add-ons.    Lavender Top    Collection Time: 05/03/21 12:24 PM   Result Value Ref Range    Extra Tube hold for add-on    Gold Top - SST    Collection Time: 05/03/21 12:24 PM   Result Value Ref Range    Extra Tube Hold for add-ons.    CBC Auto Differential    Collection Time: 05/03/21 12:24 PM    Specimen: Blood   Result Value Ref Range    WBC 6.74 3.40 - 10.80 10*3/mm3    RBC 4.36 3.77 - 5.28 10*6/mm3    Hemoglobin 10.6 (L) 12.0 - 15.9 g/dL    Hematocrit 34.2 34.0 - 46.6 %    MCV 78.4 (L) 79.0 - 97.0 fL    MCH 24.3 (L) 26.6 - 33.0 pg    MCHC 31.0 (L) 31.5 - 35.7 g/dL    RDW 16.4 (H) 12.3 - 15.4 %    RDW-SD 45.6 37.0 - 54.0 fl    MPV 9.0 6.0 - 12.0 fL    Platelets 268 140 - 450 10*3/mm3    Neutrophil % 56.0 42.7 - 76.0 %    Lymphocyte % 30.6 19.6 - 45.3 %    Monocyte % 8.2 5.0 - 12.0 %    Eosinophil % 3.6 0.3 - 6.2 %    Basophil % 1.0 0.0 - 1.5 %    Immature Grans % 0.6 (H) 0.0 - 0.5 %    Neutrophils, Absolute 3.78 1.70 - 7.00 10*3/mm3    Lymphocytes, Absolute 2.06 0.70 - 3.10 10*3/mm3    Monocytes, Absolute 0.55 0.10 - 0.90 10*3/mm3    Eosinophils, Absolute 0.24 0.00 - 0.40 10*3/mm3    Basophils, Absolute 0.07 0.00 - 0.20 10*3/mm3    Immature Grans, Absolute 0.04 0.00 - 0.05 10*3/mm3    nRBC 0.0 0.0 - 0.2 /100 WBC   Lipase    Collection Time: 05/03/21 12:24 PM    Specimen: Blood   Result Value Ref Range    Lipase 38 13 - 60 U/L   D-dimer, Quantitative    Collection Time: 05/03/21 12:24 PM    Specimen: Blood   Result Value Ref Range    D-Dimer, Quantitative 0.49 0.00 - 0.49 MCGFEU/mL   Troponin    Collection Time: 05/03/21  2:38 PM    Specimen: Blood   Result Value Ref Range    Troponin T <0.010 0.000 - 0.030 ng/mL       Ordered the above labs and reviewed the results.        RADIOLOGY  CT Head Without Contrast   Preliminary Result   No evidence of intracranial hemorrhage or of acute   infarction. See above. If indicated, further  evaluation could be   performed with MRI examination of brain.               Radiation dose reduction techniques were utilized, including automated   exposure control and exposure modulation based on body size.              XR Chest 2 View   Final Result   No focal pulmonary consolidation. Borderline heart size.   Follow-up as clinical indications persist.       This report was finalized on 5/3/2021 12:42 PM by Dr. Donny Dunn M.D.               Ordered the above noted radiological studies. Reviewed by me in PACS.          PROCEDURES  Procedures      EKG:          EKG time: 1208  Rhythm/Rate: Normal sinus rhythm 68  P waves and HI: Normal P waves  QRS, axis: Normal QRS  ST and T waves: Normal ST-T waves    Interpreted Contemporaneously by me, independently viewed  No prior        MEDICATIONS GIVEN IN ER  Medications   sodium chloride 0.9 % flush 10 mL (has no administration in time range)   HYDROmorphone (DILAUDID) injection 0.5 mg (0.5 mg Intravenous Given 5/3/21 1450)   ondansetron (ZOFRAN) injection 4 mg (4 mg Intravenous Given 5/3/21 1449)             PROGRESS AND CONSULTS  ED Course as of May 03 1706   Mon May 03, 2021   1526 15:26 EDT  Patient presents for chest pain that sounds very atypical.  Patient states pain is sharp and gets worse with deep breathing cough and palpation.  Patient's chest x-ray negative for pneumothorax or pneumonia.  Patient's on blood thinners and has normal pulse and pulse oximetry as well as D-dimer.  Patient has serial troponins that are negative.  Pain is not exertional.  Discussed with Dr. Vasques and he will see in the office.  States she feels a little mentally clouded but CT head negative for bleeding. Asking for small amount of pain meds for home.    [SL]      ED Course User Index  [SL] Rc Allison MD           MEDICAL DECISION MAKING      MDM  Number of Diagnoses or Management Options     Amount and/or Complexity of Data Reviewed  Clinical lab tests: reviewed  and ordered (Serial troponins normal)  Tests in the radiology section of CPT®: reviewed and ordered (CT head negative)  Tests in the medicine section of CPT®: reviewed  Discuss the patient with other providers: yes (Discussed with Dr. Vasques who will follow in the office)               DIAGNOSIS  Final diagnoses:   Atypical chest pain   Confusion           DISPOSITION  DISCHARGE    Patient discharged in stable condition.    Reviewed implications of results, diagnosis, meds, responsibility to follow up, warning signs and symptoms of possible worsening, potential complications and reasons to return to ER, including worsening symptoms.    Patient/Family voiced understanding of above instructions.    Discussed plan for discharge, as there is no emergent indication for admission. Patient referred to primary care provider for BP management due to today's BP. Pt/family is agreeable and understands need for follow up and repeat testing.  Pt is aware that discharge does not mean that nothing is wrong but it indicates no emergency is present that requires admission and they must continue care with follow-up as given below or physician of their choice.     FOLLOW-UP  Melissa Billy, APRN  [address] [facility] [address]  [place] [id number] [phone]    Schedule an appointment as soon as possible for a visit       Carlos A Vasques MD  [address] [facility]  [address]  [place] [id number] [phone]    Schedule an appointment as soon as possible for a visit            Medication List      Changed    * HYDROcodone-acetaminophen 5-325 MG per tablet  Commonly known as: NORCO  What changed: Another medication with the same name was added. Make sure you understand how and when to take each.     * HYDROcodone-acetaminophen 5-325 MG per tablet  Commonly known as: NORCO  Take 1 tablet by mouth Every 6 (Six) Hours As Needed for Moderate Pain .  What changed: You were already taking a medication with the same name, and this  prescription was added. Make sure you understand how and when to take each.         * This list has 2 medication(s) that are the same as other medications prescribed for you. Read the directions carefully, and ask your doctor or other care provider to review them with you.               Where to Get Your Medications      These medications were sent to [address] - [address] - [phone]  - [phone]   [address] [place] [address]    Phone: [phone]   · HYDROcodone-acetaminophen 5-325 MG per tablet             Latest Documented Vital Signs:  As of 17:06 EDT  BP- 115/61 HR- 66 Temp- 98.3 °F (36.8 °C) (Tympanic) O2 sat- 97%                           Rc Allison MD  05/03/21 1263

## 2021-05-03 NOTE — TELEPHONE ENCOUNTER
"pts daughter and daughter calling in concerned about symptoms the pt has been having.    Daughter stating pt is having chest pain that started yesterday, radiated to her left arm.  She is \"very weak,\" SOA and sleeping more that usual. The chest pain was bad yesterday, and she is reporting it is a little better today, but still there.    bp this am 161/87 hr 87    Daughter also stated that she has noted a decline in her moms mental clarity over the last couple of weeks.  She is \"tripping over nothing\"  She fell 2 weeks ago while walking a trail.  She is \"not thinking clearly\" last brain fog.    I have advised she take her to the ER for further evaluation.  Thanks  Nohemy Lopez RN  Triage nurse    "

## 2021-05-04 ENCOUNTER — TELEPHONE (OUTPATIENT)
Dept: CARDIOLOGY | Facility: CLINIC | Age: 66
End: 2021-05-04

## 2021-05-04 NOTE — TELEPHONE ENCOUNTER
Pt seen in E.R. yesterday calling to notify you. How should we proceed? f/u appt moved up or continue same care?    DA

## 2021-05-05 NOTE — PROGRESS NOTES
[facility] New Patient Office Note     Encounter Date:21  Patient:Domitila Paiz  :1955  MRN:[id number]      Chief Complaint:   Chief Complaint   Patient presents with   • [facility] ER F/U     History of Presenting Illness:      Ms. Paiz is a 65 y.o. woman past medical history notable for coronary artery disease status post percutaneous intervention, hypertension, mixed hyperlipidemia, and chronic back pain related orthopedic issues status post surgeries who presents to our office for follow up after recent emergency room visit for chest pain.  Work-up at that time did not identify any evidence of ischemia.  Her chest pain continues to be somewhat atypical but persistent.  Also patient continues to complain of labile blood pressures.      Review of Systems:  Review of Systems   Constitutional: Positive for malaise/fatigue.   HENT: Negative.    Eyes: Negative.    Cardiovascular: Positive for chest pain, dyspnea on exertion, leg swelling and palpitations.   Respiratory: Positive for shortness of breath.    Endocrine: Negative.    Hematologic/Lymphatic: Negative.    Skin: Negative.    Musculoskeletal: Negative.    Gastrointestinal: Negative.    Genitourinary: Negative.    Neurological: Negative.    Psychiatric/Behavioral: Negative.    Allergic/Immunologic: Negative.        Current Outpatient Medications on File Prior to Visit   Medication Sig Dispense Refill   • albuterol sulfate  (90 Base) MCG/ACT inhaler Inhale 2 puffs.     • apixaban (ELIQUIS) 5 MG tablet tablet Take 1 tablet by mouth Every 12 (Twelve) Hours. 180 tablet 3   • clopidogrel (PLAVIX) 75 MG tablet Take 1 tablet by mouth Daily. 90 tablet 3   • conjugated estrogens (Premarin) 0.625 MG/GM vaginal cream Insert  into the vagina 2 (Two) Times a Week. Use pea size amount. 30 g 1   • doxycycline (ADOXA) 100 MG tablet Take 100 mg by mouth As Needed.     • ezetimibe (ZETIA) 10 MG tablet Take 10 mg by mouth Daily.     •  Fluticasone-Umeclidin-Vilant (Trelegy Ellipta) 200-62.5-25 MCG/INH aerosol powder  Inhale 1 puff Daily.     • HYDROcodone-acetaminophen (NORCO) 5-325 MG per tablet Take 1 tablet by mouth Every 6 (Six) Hours As Needed for Moderate Pain . 5 tablet 0   • isosorbide mononitrate (IMDUR) 30 MG 24 hr tablet Take 1 tablet by mouth Daily. 30 tablet 0   • multivitamin with minerals (MULTIPLE VITAMINS-MINERALS PO) Take 1 tablet by mouth Daily.     • nitroglycerin (NITROSTAT) 0.4 MG SL tablet Place 0.4 mg under the tongue.     • omeprazole (priLOSEC) 20 MG capsule Take 20 mg by mouth Daily.     • rizatriptan (MAXALT) 10 MG tablet Take 10 mg by mouth 1 (One) Time As Needed for Migraine. May repeat in 2 hours if needed     • rosuvastatin (CRESTOR) 20 MG tablet Take 20 mg by mouth Daily.     • valsartan (DIOVAN) 40 MG tablet Take 40 mg by mouth Daily.     • [DISCONTINUED] carvedilol (COREG) 12.5 MG tablet Take 1 tablet by mouth 2 (Two) Times a Day With Meals. 180 tablet 3   • [DISCONTINUED] HYDROcodone-acetaminophen (NORCO) 5-325 MG per tablet Take 1 tablet by mouth Every 6 (Six) Hours As Needed.       No current facility-administered medications on file prior to visit.         Allergies   Allergen Reactions   • Wasp Venom Anaphylaxis   • Morphine Other (See Comments) and Hives     Pt states she almost od'd in the hosp on this years ago   • Poison Ivy Extract Hives   • Butorphanol Itching, Rash and Hives   • Other Rash     Burn cream       Past Medical History:   Diagnosis Date   • Heart attack (CMS/HCC)    • Hypertension        Past Surgical History:   Procedure Laterality Date   • CARDIAC SURGERY     • COLONOSCOPY  2012    Dr. Lam   • HYSTERECTOMY         Social History     Socioeconomic History   • Marital status:      Spouse name: Not on file   • Number of children: Not on file   • Years of education: Not on file   • Highest education level: Not on file   Tobacco Use   • Smoking status: Never Smoker   • Smokeless  "tobacco: Never Used   Substance and Sexual Activity   • Alcohol use: No     Comment: caffeine use    • Drug use: No   • Sexual activity: Yes     Partners: Male       Family History   Problem Relation Age of Onset   • Hypertension Mother        The following portions of the patient's history were reviewed and updated as appropriate: allergies, current medications, past family history, past medical history, past social history, past surgical history and problem list.       Objective:       Vitals:    05/06/21 0804   BP: 144/92   BP Location: Left arm   Patient Position: Sitting   Pulse: 67   Weight: 60.3 kg (133 lb)   Height: 149.9 cm (59\")     Body mass index is 26.86 kg/m².     Physical Exam:  Constitutional: Well appearing, well developed, no acute distress   HENT: Oropharynx clear and membrane moist  Eyes: Normal conjunctiva, no sclera icterus.  Neck: Supple, no carotid bruit bilaterally.  Cardiovascular: Regular rate and rhythm, No Murmur, No bilateral lower extremity edema.  Pulmonary: Normal respiratory effort, normal lung sounds, no wheezing.  Abdominal: Soft, nontender, no hepatosplenomegaly, liver is non-pulsatile.  Neurological: Alert and orient x 3.   Skin: Warm, dry, no ecchymosis, no rash.  Psych: Appropriate mood and affect. Normal judgment and insight.      Lab Results   Component Value Date    GLUCOSE 127 (H) 05/03/2021    BUN 13 05/03/2021    CREATININE 0.89 05/03/2021    EGFRIFNONA 64 05/03/2021    BCR 14.6 05/03/2021    K 3.7 05/03/2021    CO2 26.2 05/03/2021    CALCIUM 8.9 05/03/2021    ALBUMIN 4.20 05/03/2021    LABIL2 1.5 01/22/2021    AST 22 05/03/2021    ALT 20 05/03/2021       Lab Results   Component Value Date    WBC 6.74 05/03/2021    HGB 10.6 (L) 05/03/2021    HCT 34.2 05/03/2021    MCV 78.4 (L) 05/03/2021     05/03/2021       Lab Results   Component Value Date    TROPONINI 0.092 (H) 11/02/2020    TROPONINT <0.010 05/03/2021       Lab Results   Component Value Date    CHOL 111 " 02/05/2021    CHLPL 214 (H) 03/14/2019    CHLPL 208 (H) 07/13/2018    CHLPL 200 (H) 06/20/2018     Lab Results   Component Value Date    TRIG 107 02/05/2021    TRIG 60 03/14/2019    TRIG 87 07/13/2018     Lab Results   Component Value Date    HDL 58 02/05/2021    HDL 69 03/14/2019    HDL 65 07/13/2018     Lab Results   Component Value Date    LDL 33 02/05/2021     (H) 03/14/2019     (H) 07/13/2018       Lab Results   Component Value Date    TSH 0.878 11/18/2020         ECG 12 Lead    Date/Time: 5/6/2021 10:38 AM  Performed by: Carlos A Vasques MD  Authorized by: Carlos A Vasques MD   Comparison: compared with previous ECG from 5/2/2021  Similar to previous ECG  Rhythm: sinus rhythm    Clinical impression: normal ECG        Holter Monitor 2/11/2021:  · A normal monitor study.  · Underlying heart rhythm was sinus rhythm with an average heart rate of 79 bpm and a range of 55 bpm up to 110 bpm  · No diary submitted or symptoms reported during study    Cardiac catheterization 11/3/2020:  · Left main: This gives rise to the LAD and left circumflex. The left main is free of disease.  · LAD: This gives rise to a single diagonal fairly distally. It terminates at the apex. The stent in the proximal vessel is widely patent. Just prior to the diagonal branch there is a focal somewhat hazy 75% stenosis focally  · LCx: This gives rise to 2 moderate marginal is a small terminal marginal. The left circumflex is free of disease.  · RCA: This is dominant giving rise to the PDA and a posterolateral left ventricular branch both of which are large. These extend out to the ape with up to 20% narrowing in the ostium of the PDA x. The right coronary has luminal irregularity  · Successful percutaneous intervention to mid LAD with placement of 2.25 x 12 mm resolute [place] drug-eluting stent    Cardiac catheterization 10/30/2020:  · No change from cardiac catheterization earlier in the day    Cardiac catheterization  10/30/2020:  · Left Main: The left main is a large caliber vessel with a normal take off from the left coronary cusp that bifurcates to form a left anterior descending artery and a left circumflex artery. There is no angiographically significant coronary artery disease noted.  · Left anterior descending artery:The LAD is a medium caliber vessel. There is a non-calcified underfilled segment proximally followed by sequential calcified lesions 50-75% with a focal mid >75% lesion. There is a focal mid calcified 50% stenosis. There is a small caliber high takeoff diagonal and a moderate sized mid/distal takeoff diagonal 2 with no significant disease  · Left circumflex artery: The circumflex is a medium caliber, non-dominant vessel. There is a mid trifurcation where OM1 and OM2 takeoff from a small caliber AV groove branch. There is a motion artifact mid OM1/2 that is uninterpretable, these are moderate caliber vessels with no significant disease  · Right coronary artery: The RCA is a large caliber, dominant vessel. It has a normal takeoff from the right coronary cusp. The RCA terminates as a PDA and right posterolateral branch. There are focal <25% calcified stenoses in the mid RCA  · Left Ventricle: The ventricular cavity size is within normal limits. There are no stigmata of prior infarction. There is no abnormal filling defect. LVEF is estimated at 55%.  · Successful implantation of a 2.5 x18 mm resolute [place] drug-eluting stent to proximal LAD stenoses    Cardiac CTA/16/2020:  · Total calcium score 125.   · Normal coronary origins and courses.    · Severe proximal LAD disease with sequential lesions, maximum >75%        Assessment:          Diagnosis Plan   1. Coronary artery disease of native artery of native heart with stable angina pectoris (CMS/HCC)  Stress Test With Myocardial Perfusion One Day   2. Paroxysmal atrial fibrillation (CMS/HCC)     3. Mixed hyperlipidemia     4. Essential hypertension            Plan:        Ms. Paiz is a 65 y.o. woman past medical history notable for coronary artery disease status post percutaneous intervention, hypertension, mixed hyperlipidemia, and chronic back pain related orthopedic issues status post surgeries who presents to our office for scheduled follow-up.  In general her symptoms are hard to pin down.  She was diagnosed with paroxysmal atrial fibrillation on her event monitor.  The results of which are still pending for her final result and like to see if I can correlate her symptoms with paroxysmal atrial fibrillation.  Should her complaints of feeling lightheaded or falling are not related to any significant heart block or tachybradycardia syndrome as we have not gotten any alerts of any significant bradycardia arrhythmias on her event monitor.  Nonetheless would like to try and optimize her blood pressure regimen again.  Would like for her to divide up her ARB and to half a tablet twice a day.  Furthermore would like to change her carvedilol over to atenolol to see if this will improve blood pressure control without having it be as labile.  If her blood pressure is high on this we might go back to a thiazide diuretic to manage.  Given her ongoing chest discomfort I would recommend a reevaluation with a stress test.  I would hold off on a repeat cardiac catheterization at this time as she has had 3 over the last year but if still having pain despite optimal medical therapy and stress testing may consider cardiac catheterization at that time.    Coronary artery disease with stable angina:  · Continue clopidogrel and Eliquis  · We will change from carvedilol to atenolol and monitor response  · Continue high potency statin  · Had a edema and low BP with amlodipine  · To new long-acting isosorbide mononitrate    · Follow-up repeat stress test    Hypertension:  · Blood pressure has been labile and will divide up valsartan and to twice a day dosing and will change carvedilol to  atenolol  · May add back hydrochlorothiazide/chlorthalidone if blood pressure is noted to be elevated with the above changes.    Mixed Hyperlipidemia:  · Continue statin as LDL prior to starting statin therapy  · Lipid panel 2/2021 demonstrates good LDL and total cholesterol  · CMP 1/2021 demonstrates normal ALT and AST    Paroxysmal atrial fibrillation:  · Continue anticoagulation  · Continue beta blocker    Follow Up:  6 weeks      Thank you for allowing me to participate in the care of Domitila MAYNOR Paiz. Feel free to contact me directly with any further questions or concerns.    Carlos A Vasques MD  [facility]  05/06/21  10:41 EDT

## 2021-05-06 ENCOUNTER — OFFICE VISIT (OUTPATIENT)
Dept: CARDIOLOGY | Facility: CLINIC | Age: 66
End: 2021-05-06

## 2021-05-06 ENCOUNTER — TELEPHONE (OUTPATIENT)
Dept: CARDIOLOGY | Facility: CLINIC | Age: 66
End: 2021-05-06

## 2021-05-06 VITALS
SYSTOLIC BLOOD PRESSURE: 144 MMHG | HEART RATE: 67 BPM | BODY MASS INDEX: 26.81 KG/M2 | WEIGHT: 133 LBS | HEIGHT: 59 IN | DIASTOLIC BLOOD PRESSURE: 92 MMHG

## 2021-05-06 DIAGNOSIS — I25.118 CORONARY ARTERY DISEASE OF NATIVE ARTERY OF NATIVE HEART WITH STABLE ANGINA PECTORIS (HCC): Primary | ICD-10-CM

## 2021-05-06 DIAGNOSIS — I48.0 PAROXYSMAL ATRIAL FIBRILLATION (HCC): ICD-10-CM

## 2021-05-06 DIAGNOSIS — I10 ESSENTIAL HYPERTENSION: ICD-10-CM

## 2021-05-06 DIAGNOSIS — E78.2 MIXED HYPERLIPIDEMIA: ICD-10-CM

## 2021-05-06 PROCEDURE — 99214 OFFICE O/P EST MOD 30 MIN: CPT | Performed by: INTERNAL MEDICINE

## 2021-05-06 PROCEDURE — 93000 ELECTROCARDIOGRAM COMPLETE: CPT | Performed by: INTERNAL MEDICINE

## 2021-05-06 RX ORDER — ATENOLOL 50 MG/1
50 TABLET ORAL DAILY
Qty: 90 TABLET | Refills: 3 | Status: SHIPPED | OUTPATIENT
Start: 2021-05-06 | End: 2021-12-30

## 2021-05-06 NOTE — TELEPHONE ENCOUNTER
Received message regarding patient's Eliquis.  This is no longer covered by her insurance.  They do accept Xarelto 20 mg daily.  I have changed her prescription to Xarelto.  Can we let her know that once her Eliquis runs out she can transition over to Xarelto.  Let me know if she has any questions.

## 2021-05-06 NOTE — TELEPHONE ENCOUNTER
Called and spoke with patient. I explain to patient that her insurance does not want to pay for the Eliquis, so we had to switch her to Xarleto 20 mg. Patient verbalized understanding. She did not have any questions or concerns.

## 2021-05-06 NOTE — PATIENT INSTRUCTIONS
1. Will divide Valsartan 1/2 tablet BID  2. Stop Carvedilol  3. Will start Atenolol 50 mg Daily  4. Will get stress test to evaluate blood flow

## 2021-05-10 ENCOUNTER — TELEPHONE (OUTPATIENT)
Dept: CARDIOLOGY | Facility: CLINIC | Age: 66
End: 2021-05-10

## 2021-05-12 NOTE — TELEPHONE ENCOUNTER
Called patient and finally got hold of her.  Had troubles getting her over the last couple of days.  We did change her Eliquis over to Xarelto as this was covered by her insurance over Eliquis.

## 2021-05-17 RX ORDER — ISOSORBIDE MONONITRATE 30 MG/1
TABLET, EXTENDED RELEASE ORAL
Qty: 60 TABLET | Refills: 2 | Status: SHIPPED | OUTPATIENT
Start: 2021-05-17 | End: 2021-06-03

## 2021-05-18 ENCOUNTER — HOSPITAL ENCOUNTER (OUTPATIENT)
Dept: CARDIOLOGY | Facility: HOSPITAL | Age: 66
Discharge: HOME | End: 2021-05-18
Admitting: INTERNAL MEDICINE

## 2021-05-18 VITALS — WEIGHT: 132.94 LBS | HEIGHT: 59 IN | BODY MASS INDEX: 26.8 KG/M2

## 2021-05-18 DIAGNOSIS — I25.118 CORONARY ARTERY DISEASE OF NATIVE ARTERY OF NATIVE HEART WITH STABLE ANGINA PECTORIS (HCC): ICD-10-CM

## 2021-05-18 LAB
BH CV NUCLEAR PRIOR STUDY: 3
BH CV REST NUCLEAR ISOTOPE DOSE: 11.2 MCI
BH CV STRESS BP STAGE 1: NORMAL
BH CV STRESS COMMENTS STAGE 1: NORMAL
BH CV STRESS DOSE REGADENOSON STAGE 1: 0.4
BH CV STRESS DURATION MIN STAGE 1: 0
BH CV STRESS DURATION SEC STAGE 1: 10
BH CV STRESS HR STAGE 1: 113
BH CV STRESS NUCLEAR ISOTOPE DOSE: 34.6 MCI
BH CV STRESS PROTOCOL 1: NORMAL
BH CV STRESS RECOVERY BP: NORMAL MMHG
BH CV STRESS RECOVERY HR: 94 BPM
BH CV STRESS STAGE 1: 1
LV EF NUC BP: 64 %
MAXIMAL PREDICTED HEART RATE: 155 BPM
PERCENT MAX PREDICTED HR: 72.9 %
STRESS BASELINE BP: NORMAL MMHG
STRESS BASELINE HR: 69 BPM
STRESS PERCENT HR: 86 %
STRESS POST EXERCISE DUR MIN: 0 MIN
STRESS POST EXERCISE DUR SEC: 10 SEC
STRESS POST PEAK BP: NORMAL MMHG
STRESS POST PEAK HR: 113 BPM
STRESS TARGET HR: 132 BPM

## 2021-05-18 PROCEDURE — 78452 HT MUSCLE IMAGE SPECT MULT: CPT

## 2021-05-18 PROCEDURE — 93017 CV STRESS TEST TRACING ONLY: CPT

## 2021-05-18 PROCEDURE — 93018 CV STRESS TEST I&R ONLY: CPT | Performed by: INTERNAL MEDICINE

## 2021-05-18 PROCEDURE — 93016 CV STRESS TEST SUPVJ ONLY: CPT | Performed by: INTERNAL MEDICINE

## 2021-05-18 PROCEDURE — 0 TECHNETIUM TETROFOSMIN KIT: Performed by: INTERNAL MEDICINE

## 2021-05-18 PROCEDURE — 78452 HT MUSCLE IMAGE SPECT MULT: CPT | Performed by: INTERNAL MEDICINE

## 2021-05-18 PROCEDURE — 25010000002 REGADENOSON 0.4 MG/5ML SOLUTION: Performed by: INTERNAL MEDICINE

## 2021-05-18 PROCEDURE — A9502 TC99M TETROFOSMIN: HCPCS | Performed by: INTERNAL MEDICINE

## 2021-05-18 RX ADMIN — TETROFOSMIN 1 DOSE: 1.38 INJECTION, POWDER, LYOPHILIZED, FOR SOLUTION INTRAVENOUS at 09:06

## 2021-05-18 RX ADMIN — TETROFOSMIN 1 DOSE: 1.38 INJECTION, POWDER, LYOPHILIZED, FOR SOLUTION INTRAVENOUS at 08:35

## 2021-05-18 RX ADMIN — REGADENOSON 0.4 MG: 0.08 INJECTION, SOLUTION INTRAVENOUS at 09:06

## 2021-05-19 ENCOUNTER — TELEPHONE (OUTPATIENT)
Dept: CARDIOLOGY | Facility: CLINIC | Age: 66
End: 2021-05-19

## 2021-05-19 NOTE — TELEPHONE ENCOUNTER
Called patient with stress test results which were normal.      She is still having issues with her Xalerto Rx.  This has been sent to her [facility] on 5/6.  Can we make sure there is no issues with her picking up this medication?  She states that they do not have it?      Thanks

## 2021-05-19 NOTE — TELEPHONE ENCOUNTER
Called and spoke with pharmacy. The cost would $539 for a 90 day supply, if she would like to just get a 30 day supply it would only knock it down a couple of hundreds of dollars. Patient will call her insurance company tomorrow to see if she has met her deductible. As of right now I will leave her some Xarleto samples up front ready for pick-up.

## 2021-06-03 ENCOUNTER — OFFICE VISIT (OUTPATIENT)
Dept: CARDIOLOGY | Facility: CLINIC | Age: 66
End: 2021-06-03

## 2021-06-03 VITALS
SYSTOLIC BLOOD PRESSURE: 140 MMHG | HEIGHT: 59 IN | WEIGHT: 132 LBS | BODY MASS INDEX: 26.61 KG/M2 | HEART RATE: 59 BPM | DIASTOLIC BLOOD PRESSURE: 82 MMHG

## 2021-06-03 DIAGNOSIS — I25.118 CORONARY ARTERY DISEASE OF NATIVE ARTERY OF NATIVE HEART WITH STABLE ANGINA PECTORIS (HCC): Primary | ICD-10-CM

## 2021-06-03 DIAGNOSIS — I10 ESSENTIAL HYPERTENSION: ICD-10-CM

## 2021-06-03 DIAGNOSIS — E78.2 MIXED HYPERLIPIDEMIA: ICD-10-CM

## 2021-06-03 DIAGNOSIS — I48.0 PAROXYSMAL ATRIAL FIBRILLATION (HCC): ICD-10-CM

## 2021-06-03 PROCEDURE — 99214 OFFICE O/P EST MOD 30 MIN: CPT | Performed by: INTERNAL MEDICINE

## 2021-06-03 RX ORDER — CLOPIDOGREL BISULFATE 75 MG/1
75 TABLET ORAL DAILY
Qty: 90 TABLET | Refills: 3 | Status: SHIPPED | OUTPATIENT
Start: 2021-06-03 | End: 2022-02-11

## 2021-06-03 NOTE — PROGRESS NOTES
[facility] Follow Up Office Note     Encounter Date:21  Patient:Domitila Paiz  :1955  MRN:[id number]      Chief Complaint:   Chief Complaint   Patient presents with   • Coronary Artery Disease     4 week f/u   • Hypertension   • Hyperlipidemia   • Atrial Fibrillation     History of Presenting Illness:      Ms. Paiz is a 65 y.o. woman past medical history notable for coronary artery disease status post percutaneous intervention, hypertension, mixed hyperlipidemia, and chronic back pain related orthopedic issues status post surgeries who presents to our office for follow up.  She was last seen a few weeks ago due to a recent emergency room visit for chest pain.  Work-up at that time was unrevealing.  We did follow-up with a stress test which was also normal with no evidence of ischemia.  In general her chest pain is doing better.  She has the biggest complaint of generalized muscle aches all over as well as headaches.  Of note we did discuss that isosorbide mononitrate could cause headaches.  When she for started this medication she was not really having much in the way of headaches but really this is gradually worsened over the last month or so.  Additionally with regards to her myalgias we have been monitoring these but they seem to be getting worse as of late.    He still has occasional chest pain again which remains somewhat atypical localized to the left chest wall and nonradiating lasting for about an hour.  They are actually getting better in frequency and severity.      Review of Systems:  Review of Systems   Constitutional: Positive for malaise/fatigue.   Eyes: Negative.    Cardiovascular: Positive for chest pain and leg swelling.   Respiratory: Positive for shortness of breath.    Endocrine: Negative.    Hematologic/Lymphatic: Negative.    Skin: Negative.    Musculoskeletal: Negative.    Gastrointestinal: Negative.    Genitourinary: Negative.    Neurological: Positive for  headaches.   Psychiatric/Behavioral: Negative.    Allergic/Immunologic: Negative.        Current Outpatient Medications on File Prior to Visit   Medication Sig Dispense Refill   • albuterol sulfate  (90 Base) MCG/ACT inhaler Inhale 2 puffs.     • atenolol (TENORMIN) 50 MG tablet Take 1 tablet by mouth Daily. 90 tablet 3   • clopidogrel (PLAVIX) 75 MG tablet Take 1 tablet by mouth Daily. 90 tablet 3   • conjugated estrogens (Premarin) 0.625 MG/GM vaginal cream Insert  into the vagina 2 (Two) Times a Week. Use pea size amount. 30 g 1   • doxycycline (ADOXA) 100 MG tablet Take 100 mg by mouth As Needed.     • ezetimibe (ZETIA) 10 MG tablet Take 10 mg by mouth Daily.     • Fluticasone-Umeclidin-Vilant (Trelegy Ellipta) 200-62.5-25 MCG/INH aerosol powder  Inhale 1 puff Daily.     • HYDROcodone-acetaminophen (NORCO) 5-325 MG per tablet Take 1 tablet by mouth Every 6 (Six) Hours As Needed for Moderate Pain . 5 tablet 0   • isosorbide mononitrate (IMDUR) 30 MG 24 hr tablet Take 1 tablet by mouth once daily 60 tablet 2   • multivitamin with minerals (MULTIPLE VITAMINS-MINERALS PO) Take 1 tablet by mouth Daily.     • nitroglycerin (NITROSTAT) 0.4 MG SL tablet Place 0.4 mg under the tongue.     • omeprazole (priLOSEC) 20 MG capsule Take 20 mg by mouth Daily.     • rivaroxaban (XARELTO) 20 MG tablet Take 1 tablet by mouth Daily. 90 tablet 3   • rizatriptan (MAXALT) 10 MG tablet Take 10 mg by mouth 1 (One) Time As Needed for Migraine. May repeat in 2 hours if needed     • rosuvastatin (CRESTOR) 20 MG tablet Take 20 mg by mouth Daily.     • valsartan (DIOVAN) 40 MG tablet Take 40 mg by mouth Daily.       No current facility-administered medications on file prior to visit.         Allergies   Allergen Reactions   • Wasp Venom Anaphylaxis   • Morphine Other (See Comments) and Hives     Pt states she almost od'd in the hosp on this years ago   • Poison Ivy Extract Hives   • Butorphanol Itching, Rash and Hives   • Other Rash  "    Burn cream       Past Medical History:   Diagnosis Date   • Heart attack (CMS/HCC)    • Hypertension        Past Surgical History:   Procedure Laterality Date   • CARDIAC SURGERY     • COLONOSCOPY  2012    Dr. Lam   • HYSTERECTOMY         Social History     Socioeconomic History   • Marital status:      Spouse name: Not on file   • Number of children: Not on file   • Years of education: Not on file   • Highest education level: Not on file   Tobacco Use   • Smoking status: Never Smoker   • Smokeless tobacco: Never Used   Substance and Sexual Activity   • Alcohol use: No     Comment: caffeine use    • Drug use: No   • Sexual activity: Yes     Partners: Male       Family History   Problem Relation Age of Onset   • Hypertension Mother        The following portions of the patient's history were reviewed and updated as appropriate: allergies, current medications, past family history, past medical history, past social history, past surgical history and problem list.       Objective:       Vitals:    06/03/21 1508   BP: 140/82   BP Location: Left arm   Patient Position: Sitting   Pulse: 59   Weight: 59.9 kg (132 lb)   Height: 149 cm (58.66\")     Body mass index is 26.97 kg/m².     Physical Exam:  Constitutional: Well appearing, well developed, no acute distress   HENT: Oropharynx clear and membrane moist  Eyes: Normal conjunctiva, no sclera icterus.  Neck: Supple, no carotid bruit bilaterally.  Cardiovascular: Regular rate and rhythm, No Murmur, No bilateral lower extremity edema.  Pulmonary: Normal respiratory effort, normal lung sounds, no wheezing.  Abdominal: Soft, nontender, no hepatosplenomegaly, liver is non-pulsatile.  Neurological: Alert and orient x 3.   Skin: Warm, dry, no ecchymosis, no rash.  Psych: Appropriate mood and affect. Normal judgment and insight.      Lab Results   Component Value Date    GLUCOSE 127 (H) 05/03/2021    BUN 13 05/03/2021    CREATININE 0.89 05/03/2021    EGFRIFNONA 64 " 05/03/2021    BCR 14.6 05/03/2021    K 3.7 05/03/2021    CO2 26.2 05/03/2021    CALCIUM 8.9 05/03/2021    ALBUMIN 4.20 05/03/2021    LABIL2 1.5 01/22/2021    AST 22 05/03/2021    ALT 20 05/03/2021       Lab Results   Component Value Date    WBC 6.74 05/03/2021    HGB 10.6 (L) 05/03/2021    HCT 34.2 05/03/2021    MCV 78.4 (L) 05/03/2021     05/03/2021       Lab Results   Component Value Date    TROPONINI 0.092 (H) 11/02/2020    TROPONINT <0.010 05/03/2021       Lab Results   Component Value Date    CHOL 111 02/05/2021    CHLPL 214 (H) 03/14/2019    CHLPL 208 (H) 07/13/2018    CHLPL 200 (H) 06/20/2018     Lab Results   Component Value Date    TRIG 107 02/05/2021    TRIG 60 03/14/2019    TRIG 87 07/13/2018     Lab Results   Component Value Date    HDL 58 02/05/2021    HDL 69 03/14/2019    HDL 65 07/13/2018     Lab Results   Component Value Date    LDL 33 02/05/2021     (H) 03/14/2019     (H) 07/13/2018       Lab Results   Component Value Date    TSH 0.878 11/18/2020       Stress MPI 5/18/2021:  · Findings consistent with an indeterminate ECG stress test.  · Left ventricular ejection fraction is normal. (Calculated EF = 64%).  · Myocardial perfusion imaging indicates a normal myocardial perfusion study with no evidence of ischemia.  · Impressions are consistent with a low risk study.  · There is no prior study available for comparison.    Event Monitor 5/12/2021 with tracing reviewed by myself:  · An abnormal monitor study.  · Underlying heart rhythm was sinus rhythm.  · Paroxysmal atrial fibrillation noted during study at a burden of less than 1%.  · 3-second pause noted on conversion from atrial fibrillation to sinus rhythm.  · Symptoms of fatigue and chest pain correlated with sinus rhythm.    Holter Monitor 2/11/2021:  · A normal monitor study.  · Underlying heart rhythm was sinus rhythm with an average heart rate of 79 bpm and a range of 55 bpm up to 110 bpm  · No diary submitted or symptoms  reported during study    Cardiac catheterization 11/3/2020:  · Left main: This gives rise to the LAD and left circumflex. The left main is free of disease.  · LAD: This gives rise to a single diagonal fairly distally. It terminates at the apex. The stent in the proximal vessel is widely patent. Just prior to the diagonal branch there is a focal somewhat hazy 75% stenosis focally  · LCx: This gives rise to 2 moderate marginal is a small terminal marginal. The left circumflex is free of disease.  · RCA: This is dominant giving rise to the PDA and a posterolateral left ventricular branch both of which are large. These extend out to the ape with up to 20% narrowing in the ostium of the PDA x. The right coronary has luminal irregularity  · Successful percutaneous intervention to mid LAD with placement of 2.25 x 12 mm resolute Natrona drug-eluting stent    Cardiac catheterization 10/30/2020:  · No change from cardiac catheterization earlier in the day    Cardiac catheterization 10/30/2020:  · Left Main: The left main is a large caliber vessel with a normal take off from the left coronary cusp that bifurcates to form a left anterior descending artery and a left circumflex artery. There is no angiographically significant coronary artery disease noted.  · Left anterior descending artery:The LAD is a medium caliber vessel. There is a non-calcified underfilled segment proximally followed by sequential calcified lesions 50-75% with a focal mid >75% lesion. There is a focal mid calcified 50% stenosis. There is a small caliber high takeoff diagonal and a moderate sized mid/distal takeoff diagonal 2 with no significant disease  · Left circumflex artery: The circumflex is a medium caliber, non-dominant vessel. There is a mid trifurcation where OM1 and OM2 takeoff from a small caliber AV groove branch. There is a motion artifact mid OM1/2 that is uninterpretable, these are moderate caliber vessels with no significant disease  · Right  coronary artery: The RCA is a large caliber, dominant vessel. It has a normal takeoff from the right coronary cusp. The RCA terminates as a PDA and right posterolateral branch. There are focal <25% calcified stenoses in the mid RCA  · Left Ventricle: The ventricular cavity size is within normal limits. There are no stigmata of prior infarction. There is no abnormal filling defect. LVEF is estimated at 55%.  · Successful implantation of a 2.5 x18 mm resolute Kt drug-eluting stent to proximal LAD stenoses    Cardiac CTA/16/2020:  · Total calcium score 125.   · Normal coronary origins and courses.    · Severe proximal LAD disease with sequential lesions, maximum >75%        Assessment:          Diagnosis Plan   1. Coronary artery disease of native artery of native heart with stable angina pectoris (CMS/HCC)     2. Mixed hyperlipidemia     3. Essential hypertension     4. Paroxysmal atrial fibrillation (CMS/[facility])            Plan:       Ms. Paiz is a 65 y.o. woman past medical history notable for coronary artery disease status post percutaneous intervention, hypertension, mixed hyperlipidemia, and chronic back pain related orthopedic issues status post surgeries who presents to our office for scheduled follow-up.  Overall patient seems to be doing recently well from a chest pain standpoint.  We will hold off on further evaluation as the next step would likely be a heart catheterization since her pain remains unchanged and actually somewhat improved.  Furthermore her recent stress test was also low risk.  If still having issues that would be our next step.  With regards to her headaches I will stop her isosorbide mononitrate.  With regards to her myalgias I would like to stop her Zetia and rosuvastatin.  I would like for this to washout for about 2 weeks and then she will call us back and let us know how she is doing.  If her symptoms are improved then we might be dealing with a statin myalgia side effect.  At that  point I think we could try reintroducing a lower dose rosuvastatin at a weekly dose to her regimen and may be increase this slowly to 3 times a week as her last lipid panel actually showed it is extremely low LDL and her current regimen might be too much for her anyways.  Finally with regards to her anticoagulation she is saying that Xarelto is too expensive.  We will try samples for another round but if still being an issue we discussed consideration of switching over to Coumadin.  Additionally it appears that she was still taking her Effient and Xarelto.  I have asked her to change over to clopidogrel and Xarelto.  I believe the mixup occurred because she did not  her Eliquis due to it being expensive.      Coronary artery disease with stable angina:  · Patient will stop Effient and continue with clopidogrel and Xarelto  · Continue atenolol   · We will stop statin for the time being and reassess myalgias   · Had a edema and low BP with amlodipine  · Patient had headaches with long-acting isosorbide mononitrate      Hypertension:  · Blood pressure seems to be doing better on current regimen.  Will monitor blood pressure after stopping isosorbide mononitrate.    · May add back hydrochlorothiazide/chlorthalidone if blood pressure is noted to be elevated with the above changes however overall her blood pressure seems to be evening out better on her current regimen    Mixed Hyperlipidemia:  · Holding statins due to concerns for myalgia and will reassess symptoms in 2 weeks and consider restarting at lower dose  · Lipid panel 2/2021 demonstrates good LDL and total cholesterol  · CMP 5/2021 demonstrates normal ALT and AST    Paroxysmal atrial fibrillation:  · Continue anticoagulation may need to consider Coumadin if patient unable to afford Xarelto  · Continue beta blocker    Follow Up:  3 Months      Thank you for allowing me to participate in the care of Domitila MAYNOR Paiz. Feel free to contact me directly with any  further questions or concerns.    Carlos A Vasques MD  [facility]  06/03/21  15:22 EDT

## 2021-06-03 NOTE — PATIENT INSTRUCTIONS
1. Will stop Rosuvastatin (Crestor) and Zetia and monitor back and muscle aches.  Call us in two week to let us know how you are feeling off of this medication  2. Stop Imdur due to headaches   3. Stop Effient and  Clopidogrel

## 2021-07-15 ENCOUNTER — OFFICE VISIT (OUTPATIENT)
Dept: OBSTETRICS AND GYNECOLOGY | Facility: CLINIC | Age: 66
End: 2021-07-15

## 2021-07-15 VITALS
HEIGHT: 59 IN | BODY MASS INDEX: 26.41 KG/M2 | DIASTOLIC BLOOD PRESSURE: 75 MMHG | WEIGHT: 131 LBS | SYSTOLIC BLOOD PRESSURE: 152 MMHG

## 2021-07-15 DIAGNOSIS — R10.2 PELVIC PAIN: Primary | ICD-10-CM

## 2021-07-15 DIAGNOSIS — R31.9 HEMATURIA, UNSPECIFIED TYPE: ICD-10-CM

## 2021-07-15 LAB
BILIRUB BLD-MCNC: NEGATIVE MG/DL
CLARITY, POC: CLEAR
COLOR UR: YELLOW
GLUCOSE UR STRIP-MCNC: NEGATIVE MG/DL
KETONES UR QL: NEGATIVE
LEUKOCYTE EST, POC: NEGATIVE
NITRITE UR-MCNC: NEGATIVE MG/ML
PH UR: 5.5 [PH] (ref 5–8)
PROT UR STRIP-MCNC: NEGATIVE MG/DL
RBC # UR STRIP: ABNORMAL /UL
SP GR UR: 1.01 (ref 1–1.03)
UROBILINOGEN UR QL: NORMAL

## 2021-07-15 PROCEDURE — 81002 URINALYSIS NONAUTO W/O SCOPE: CPT | Performed by: NURSE PRACTITIONER

## 2021-07-15 PROCEDURE — 99214 OFFICE O/P EST MOD 30 MIN: CPT | Performed by: NURSE PRACTITIONER

## 2021-07-15 RX ORDER — NITROFURANTOIN 25; 75 MG/1; MG/1
100 CAPSULE ORAL 2 TIMES DAILY
Qty: 10 CAPSULE | Refills: 0 | Status: SHIPPED | OUTPATIENT
Start: 2021-07-15 | End: 2021-07-20

## 2021-07-15 RX ORDER — VALSARTAN 40 MG/1
40 TABLET ORAL DAILY
COMMUNITY
End: 2021-12-30

## 2021-07-15 RX ORDER — HYDROCHLOROTHIAZIDE 12.5 MG/1
12.5 CAPSULE, GELATIN COATED ORAL DAILY
COMMUNITY
End: 2021-12-30

## 2021-07-15 NOTE — PROGRESS NOTES
"Chief Complaint   Patient presents with   • Abdominal Pain     Pt c/o pain on left side.Pt has had a hyst but states she still has her ovaries.         SUBJECTIVE:     Domitila Paiz is a 65 y.o.  who presents with c/o left sided pelvic pain X 2-3 weeks. Pain feels sharp in nature.  This is a new problem. Prior hysterectomy w/o BSO.     C/o \"something is bulging out\" of vagina. There is no pain. She is having mixed incontinence X2 weeks. Prior bladder repair. She reports a several year (approx 5 hr hx) history of hematuria. She has not seen urology for this issue. She is a nonsmoker. Denies current dysuria    This is my first time meeting Domitila Paiz  She is a pt of Dr Tompkins's    Past Medical History:   Diagnosis Date   • Heart attack (CMS/HCC)    • Hypertension       Past Surgical History:   Procedure Laterality Date   • CARDIAC SURGERY     • COLONOSCOPY      Dr. Lam   • HYSTERECTOMY        Social History     Tobacco Use   • Smoking status: Never Smoker   • Smokeless tobacco: Never Used   Substance Use Topics   • Alcohol use: No     Comment: caffeine use    • Drug use: No     OB History    Para Term  AB Living   2 2 2         SAB TAB Ectopic Molar Multiple Live Births                    # Outcome Date GA Lbr Slick/2nd Weight Sex Delivery Anes PTL Lv   2 Term            1 Term                 Review of Systems   Constitutional: Negative for chills, fatigue and fever.   Gastrointestinal: Negative for abdominal distention, abdominal pain, constipation, diarrhea, nausea and vomiting.   Genitourinary: Positive for dyspareunia, frequency, pelvic pain (LLQ pain) and urgency. Negative for dysuria, vaginal bleeding, vaginal discharge and vaginal pain.        + bladder leakage  + vaginal odor  -vaginal itching   Musculoskeletal: Positive for back pain (left side, hx of back surgery). Negative for gait problem.       OBJECTIVE:   Vitals:    07/15/21 1025   BP: 152/75   Weight: 59.4 kg (131 lb) " "  Height: 149.9 cm (59\")        Physical Exam  Vitals and nursing note reviewed.   Constitutional:       Appearance: Normal appearance.   HENT:      Head: Normocephalic and atraumatic.   Eyes:      Pupils: Pupils are equal, round, and reactive to light.   Cardiovascular:      Rate and Rhythm: Normal rate.      Pulses: Normal pulses.   Pulmonary:      Effort: Pulmonary effort is normal.   Abdominal:      General: There is no distension.      Palpations: Abdomen is soft. There is no mass.      Tenderness: There is abdominal tenderness. There is no right CVA tenderness, left CVA tenderness or guarding.      Hernia: No hernia is present. There is no hernia in the left inguinal area or right inguinal area.   Genitourinary:     General: Normal vulva.      Exam position: Lithotomy position.      Pubic Area: No rash or pubic lice.       Labia:         Right: No rash, tenderness, lesion or injury.         Left: No rash, tenderness, lesion or injury.       Urethra: No prolapse, urethral pain, urethral swelling or urethral lesion.      Vagina: Tenderness (intolerant of vaginal exam) present. No vaginal discharge, erythema or bleeding.      Uterus: Normal.       Adnexa:         Right: No mass, tenderness or fullness.          Left: Tenderness present. No mass or fullness.        Comments: Mild first degree cystocele  Moderate vaginal atrophy noted  Musculoskeletal:         General: Normal range of motion.      Cervical back: Normal range of motion.   Lymphadenopathy:      Lower Body: No right inguinal adenopathy. No left inguinal adenopathy.   Skin:     General: Skin is warm and dry.   Neurological:      General: No focal deficit present.      Mental Status: She is alert and oriented to person, place, and time.      Cranial Nerves: No cranial nerve deficit.   Psychiatric:         Mood and Affect: Mood normal.         Behavior: Behavior normal.         Thought Content: Thought content normal.         Judgment: Judgment normal. "         ASSESSMENT:   1) Pelvic pain  2) Mixed incontinence  3) Hematuria    PLAN:   Urine dip +blood, sent for urine culture  Urology referral to further evaluate hematuria  NuSwab collected  Transvaginal ultrasound completed and reviewed with pt-normal pelvic ultrasound  Given new onset of incontinence and pelvic pain, will treat empirically for UTI while awaiting culture results  Macrobid sent to pharmacy  Reviewed S&S of worsening infection    Follow up:PRN and annually     I have spent 35 min in face to face time with the patient and 30 min of this time was spent in counseling on the above stated issues.       Xenia Sousa, APRN  7/15/2021  10:36 EDT

## 2021-07-16 ENCOUNTER — TELEPHONE (OUTPATIENT)
Dept: OBSTETRICS AND GYNECOLOGY | Facility: CLINIC | Age: 66
End: 2021-07-16

## 2021-07-16 NOTE — TELEPHONE ENCOUNTER
Pt was not available to talk when I called.  She will call back at her earliest convenience for appt details at [facility].    Please inform pt she is scheduled at [facility] w/Dr. Angle Chin on Thursday 7/22/2021 at 4:00pm, 3:45 arrival.  The address is [address] [place] [address].  If she needs to r/s, she may call them at [phone].    Pt # [phone]

## 2021-07-17 LAB
BACTERIA UR CULT: NO GROWTH
BACTERIA UR CULT: NORMAL

## 2021-07-19 ENCOUNTER — TELEPHONE (OUTPATIENT)
Dept: OBSTETRICS AND GYNECOLOGY | Facility: CLINIC | Age: 66
End: 2021-07-19

## 2021-07-19 NOTE — TELEPHONE ENCOUNTER
----- Message from GIOVANNY Interiano sent at 7/19/2021 10:44 AM EDT -----  Please let the pt know that her vaginal cultures and urine culture were negative. Thank you

## 2021-07-21 NOTE — TELEPHONE ENCOUNTER
Tony called, she is out of town and will not be able to make that appt. She said she was unable to reach [facility] to r/s at the [phone] number. She wanted to see if we would reschedule her for next week after Monday.     Thank you

## 2021-07-21 NOTE — TELEPHONE ENCOUNTER
Pt called me as well.  Informed her I would r/s appt at [facility] to a Thursday or Friday morning.  I was not informed pt would be out of town.      I called [facility], was on hold for 20 minutes.  I finally left a voicemail requesting a call back for rescheduling.     [facility] [phone]    Pt # [phone]

## 2021-12-08 ENCOUNTER — TELEPHONE (OUTPATIENT)
Dept: GASTROENTEROLOGY | Facility: CLINIC | Age: 66
End: 2021-12-08

## 2021-12-30 ENCOUNTER — TRANSCRIBE ORDERS (OUTPATIENT)
Dept: CARDIOLOGY | Facility: CLINIC | Age: 66
End: 2021-12-30

## 2021-12-30 ENCOUNTER — OFFICE VISIT (OUTPATIENT)
Dept: CARDIOLOGY | Facility: CLINIC | Age: 66
End: 2021-12-30

## 2021-12-30 VITALS
DIASTOLIC BLOOD PRESSURE: 80 MMHG | WEIGHT: 131.8 LBS | BODY MASS INDEX: 26.57 KG/M2 | HEART RATE: 76 BPM | SYSTOLIC BLOOD PRESSURE: 132 MMHG | HEIGHT: 59 IN

## 2021-12-30 DIAGNOSIS — I25.118 CORONARY ARTERY DISEASE OF NATIVE ARTERY OF NATIVE HEART WITH STABLE ANGINA PECTORIS (HCC): ICD-10-CM

## 2021-12-30 DIAGNOSIS — I10 ESSENTIAL HYPERTENSION: ICD-10-CM

## 2021-12-30 DIAGNOSIS — R07.9 CHEST PAIN, UNSPECIFIED TYPE: Primary | ICD-10-CM

## 2021-12-30 DIAGNOSIS — I48.0 PAROXYSMAL ATRIAL FIBRILLATION (HCC): ICD-10-CM

## 2021-12-30 DIAGNOSIS — Z01.810 PREPROCEDURAL CARDIOVASCULAR EXAMINATION: ICD-10-CM

## 2021-12-30 DIAGNOSIS — E78.2 MIXED HYPERLIPIDEMIA: ICD-10-CM

## 2021-12-30 DIAGNOSIS — Z13.6 SCREENING FOR CARDIOVASCULAR CONDITION: Primary | ICD-10-CM

## 2021-12-30 DIAGNOSIS — Z01.818 OTHER SPECIFIED PRE-OPERATIVE EXAMINATION: ICD-10-CM

## 2021-12-30 PROCEDURE — 99214 OFFICE O/P EST MOD 30 MIN: CPT | Performed by: INTERNAL MEDICINE

## 2021-12-30 PROCEDURE — 93000 ELECTROCARDIOGRAM COMPLETE: CPT | Performed by: INTERNAL MEDICINE

## 2021-12-30 RX ORDER — HYDRALAZINE HYDROCHLORIDE 25 MG/1
25 TABLET, FILM COATED ORAL DAILY
Status: ON HOLD | COMMUNITY
Start: 2021-11-19 | End: 2022-01-04

## 2021-12-30 RX ORDER — HYDRALAZINE HYDROCHLORIDE 50 MG/1
50 TABLET, FILM COATED ORAL NIGHTLY
COMMUNITY
Start: 2021-12-14 | End: 2022-09-13 | Stop reason: SDUPTHER

## 2021-12-30 RX ORDER — HYDROCHLOROTHIAZIDE 12.5 MG/1
12.5 CAPSULE, GELATIN COATED ORAL DAILY
Qty: 90 CAPSULE | Refills: 3 | Status: ON HOLD
Start: 2021-12-30 | End: 2022-01-04

## 2021-12-30 RX ORDER — MINOCYCLINE HYDROCHLORIDE 50 MG/1
50 CAPSULE ORAL DAILY
COMMUNITY
Start: 2021-12-06 | End: 2022-01-27

## 2021-12-30 NOTE — PROGRESS NOTES
[facility] Follow Up Office Note     Encounter Date:21  Patient:Domitila Paiz  :1955  MRN:[id number]      Chief Complaint:   Chief Complaint   Patient presents with   • Coronary Artery Disease     6 month f/u   • PAF     History of Presenting Illness:      Ms. Paiz is a 66 y.o. woman past medical history notable for coronary artery disease status post percutaneous intervention, hypertension, mixed hyperlipidemia, and chronic back pain related orthopedic issues status post surgeries who presents to our office for follow up.  Unfortunately the last month patient has had significant escalation or anginal symptoms.  She has been very challenging to control with regards to her chest pain.  She has had chest pain ever since her stenting in  but this time is having much more exertional symptoms.  Even walking up her driveway she is getting chest pain taking sublingual nitroglycerin.  She has a lot of medication intolerances including beta-blocker, amlodipine, and long-acting nitrates.  Is been hard to manage her symptoms even if this is noncoronary artery disease related but she does have a history of stenting in the past due to renal disease.      Review of Systems:  Review of Systems   Constitutional: Positive for malaise/fatigue.   Eyes: Negative.    Cardiovascular: Positive for chest pain and leg swelling.   Respiratory: Positive for shortness of breath.    Endocrine: Negative.    Hematologic/Lymphatic: Negative.    Skin: Negative.    Musculoskeletal: Negative.    Gastrointestinal: Negative.    Genitourinary: Negative.    Neurological: Negative.    Psychiatric/Behavioral: Negative.    Allergic/Immunologic: Negative.          Current Outpatient Medications on File Prior to Visit   Medication Sig Dispense Refill   • clopidogrel (PLAVIX) 75 MG tablet Take 1 tablet by mouth Daily. 90 tablet 3   • hydrALAZINE (APRESOLINE) 25 MG tablet Take 25 mg by mouth Daily.     • hydrALAZINE  (APRESOLINE) 50 MG tablet Take 50 mg by mouth Every Night.     • minocycline (MINOCIN,DYNACIN) 50 MG capsule Take 50 mg by mouth Daily. with food     • nitroglycerin (NITROSTAT) 0.4 MG SL tablet Place 0.4 mg under the tongue.     • rivaroxaban (XARELTO) 15 MG tablet Take 15 mg by mouth Daily.     • [DISCONTINUED] atenolol (TENORMIN) 50 MG tablet Take 1 tablet by mouth Daily. 90 tablet 3   • [DISCONTINUED] hydroCHLOROthiazide (MICROZIDE) 12.5 MG capsule Take 12.5 mg by mouth Daily.     • [DISCONTINUED] rivaroxaban (XARELTO) 20 MG tablet Take 1 tablet by mouth Daily. 90 tablet 3   • [DISCONTINUED] valsartan (DIOVAN) 40 MG tablet Take 40 mg by mouth Daily.       No current facility-administered medications on file prior to visit.         Allergies   Allergen Reactions   • Wasp Venom Anaphylaxis   • Amlodipine Swelling   • Lisinopril Cough   • Morphine Other (See Comments) and Hives     Pt states she almost od'd in the hosp on this years ago   • Poison Ivy Extract Hives   • Valsartan Other (See Comments)   • Butorphanol Itching, Rash and Hives   • Other Rash     Burn cream       Past Medical History:   Diagnosis Date   • Heart attack (HCC)    • Hypertension        Past Surgical History:   Procedure Laterality Date   • CARDIAC SURGERY     • COLONOSCOPY  2012    Dr. Lam   • HYSTERECTOMY         Social History     Socioeconomic History   • Marital status:    Tobacco Use   • Smoking status: Never Smoker   • Smokeless tobacco: Never Used   Substance and Sexual Activity   • Alcohol use: No     Comment: caffeine use    • Drug use: No   • Sexual activity: Yes     Partners: Male     Birth control/protection: Surgical       Family History   Problem Relation Age of Onset   • Hypertension Mother        The following portions of the patient's history were reviewed and updated as appropriate: allergies, current medications, past family history, past medical history, past social history, past surgical history and problem  "list.       Objective:       Vitals:    12/30/21 1037   BP: 132/80   BP Location: Left arm   Patient Position: Sitting   Pulse: 76   Weight: 59.8 kg (131 lb 12.8 oz)   Height: 149.9 cm (59\")     Body mass index is 26.62 kg/m².     Physical Exam:  Constitutional: Well appearing, well developed, no acute distress   HENT: Oropharynx clear and membrane moist  Eyes: Normal conjunctiva, no sclera icterus.  Neck: Supple, no carotid bruit bilaterally.  Cardiovascular: Regular rate and rhythm, No Murmur, No bilateral lower extremity edema.  Pulmonary: Normal respiratory effort, normal lung sounds, no wheezing.  Abdominal: Soft, nontender, no hepatosplenomegaly, liver is non-pulsatile.  Neurological: Alert and orient x 3.   Skin: Warm, dry, no ecchymosis, no rash.  Psych: Appropriate mood and affect. Normal judgment and insight.      Lab Results   Component Value Date    GLUCOSE 127 (H) 05/03/2021    BUN 13 05/03/2021    CREATININE 0.89 05/03/2021    EGFRIFNONA 64 05/03/2021    BCR 14.6 05/03/2021    K 3.7 05/03/2021    CO2 26.2 05/03/2021    CALCIUM 8.9 05/03/2021    ALBUMIN 4.20 05/03/2021    LABIL2 1.5 01/22/2021    AST 22 05/03/2021    ALT 20 05/03/2021       Lab Results   Component Value Date    WBC 6.74 05/03/2021    HGB 10.6 (L) 05/03/2021    HCT 34.2 05/03/2021    MCV 78.4 (L) 05/03/2021     05/03/2021       Lab Results   Component Value Date    TROPONINI 0.092 (H) 11/02/2020    TROPONINT <0.010 05/03/2021       Lab Results   Component Value Date    CHOL 111 02/05/2021    CHLPL 214 (H) 03/14/2019    CHLPL 208 (H) 07/13/2018    CHLPL 200 (H) 06/20/2018     Lab Results   Component Value Date    TRIG 107 02/05/2021    TRIG 60 03/14/2019    TRIG 87 07/13/2018     Lab Results   Component Value Date    HDL 58 02/05/2021    HDL 69 03/14/2019    HDL 65 07/13/2018     Lab Results   Component Value Date    LDL 33 02/05/2021     (H) 03/14/2019     (H) 07/13/2018       Lab Results   Component Value Date    " TSH 0.878 11/18/2020       ECG 12 Lead    Date/Time: 12/30/2021 11:20 AM  Performed by: Carlos A Vasques MD  Authorized by: CarlosA Vasques MD   Comparison: compared with previous ECG from 5/6/2021  Similar to previous ECG  Rhythm: sinus rhythm  Other findings: non-specific ST-T wave changes        Stress MPI 5/18/2021:  · Findings consistent with an indeterminate ECG stress test.  · Left ventricular ejection fraction is normal. (Calculated EF = 64%).  · Myocardial perfusion imaging indicates a normal myocardial perfusion study with no evidence of ischemia.  · Impressions are consistent with a low risk study.  · There is no prior study available for comparison.    Event Monitor 5/12/2021 with tracing reviewed by myself:  · An abnormal monitor study.  · Underlying heart rhythm was sinus rhythm.  · Paroxysmal atrial fibrillation noted during study at a burden of less than 1%.  · 3-second pause noted on conversion from atrial fibrillation to sinus rhythm.  · Symptoms of fatigue and chest pain correlated with sinus rhythm.    Holter Monitor 2/11/2021:  · A normal monitor study.  · Underlying heart rhythm was sinus rhythm with an average heart rate of 79 bpm and a range of 55 bpm up to 110 bpm  · No diary submitted or symptoms reported during study    Cardiac catheterization 11/3/2020:  · Left main: This gives rise to the LAD and left circumflex. The left main is free of disease.  · LAD: This gives rise to a single diagonal fairly distally. It terminates at the apex. The stent in the proximal vessel is widely patent. Just prior to the diagonal branch there is a focal somewhat hazy 75% stenosis focally  · LCx: This gives rise to 2 moderate marginal is a small terminal marginal. The left circumflex is free of disease.  · RCA: This is dominant giving rise to the PDA and a posterolateral left ventricular branch both of which are large. These extend out to the ape with up to 20% narrowing in the ostium of the PDA x. The  right coronary has luminal irregularity  · Successful percutaneous intervention to mid LAD with placement of 2.25 x 12 mm resolute Reyno drug-eluting stent    Cardiac catheterization 10/30/2020:  · No change from cardiac catheterization earlier in the day    Cardiac catheterization 10/30/2020:  · Left Main: The left main is a large caliber vessel with a normal take off from the left coronary cusp that bifurcates to form a left anterior descending artery and a left circumflex artery. There is no angiographically significant coronary artery disease noted.  · Left anterior descending artery:The LAD is a medium caliber vessel. There is a non-calcified underfilled segment proximally followed by sequential calcified lesions 50-75% with a focal mid >75% lesion. There is a focal mid calcified 50% stenosis. There is a small caliber high takeoff diagonal and a moderate sized mid/distal takeoff diagonal 2 with no significant disease  · Left circumflex artery: The circumflex is a medium caliber, non-dominant vessel. There is a mid trifurcation where OM1 and OM2 takeoff from a small caliber AV groove branch. There is a motion artifact mid OM1/2 that is uninterpretable, these are moderate caliber vessels with no significant disease  · Right coronary artery: The RCA is a large caliber, dominant vessel. It has a normal takeoff from the right coronary cusp. The RCA terminates as a PDA and right posterolateral branch. There are focal <25% calcified stenoses in the mid RCA  · Left Ventricle: The ventricular cavity size is within normal limits. There are no stigmata of prior infarction. There is no abnormal filling defect. LVEF is estimated at 55%.  · Successful implantation of a 2.5 x18 mm resolute Kt drug-eluting stent to proximal LAD stenoses    Cardiac CTA/16/2020:  · Total calcium score 125.   · Normal coronary origins and courses.    · Severe proximal LAD disease with sequential lesions, maximum >75%        Assessment:           Diagnosis Plan   1. Chest pain, unspecified type  Case Request Cath Lab: Left Heart Cath    ECG 12 Lead   2. Coronary artery disease of native artery of native heart with stable angina pectoris (HCC)  Case Request Cath Lab: Left Heart Cath   3. Mixed hyperlipidemia     4. Paroxysmal atrial fibrillation (HCC)  ECG 12 Lead   5. Essential hypertension            Plan:       Ms. Paiz is a 66 y.o. woman past medical history notable for coronary artery disease status post percutaneous intervention, hypertension, mixed hyperlipidemia, and chronic back pain related orthopedic issues status post surgeries who presents to our office for scheduled follow-up.  Unfortunately patient symptoms have escalated significantly over the last month.  She did have recent stress testing in May which was normal but given change in symptoms and given her known coronary disease and challenges with medical therapy to try and improve her symptoms would recommend repeat heart catheterization at this point.        Coronary artery disease with unstable angina:  · Continue with clopidogrel and Xarelto  · Patient had stopped atenolol unclear why but patient does have a lot of intolerances to medications.  · Patient states her myalgias are better off of statin therapy  · Had a edema and low BP with amlodipine  · Patient had headaches with long-acting isosorbide mononitrate    · Given escalating symptoms concerning for new coronary disease will proceed forward with cardiac catheterization    Hypertension:  · Relatively stable on current regimen but patient states that she does not like taking hydralazine.  That being said she has a lot of intolerances almost to every other blood pressure medication were somewhat limited in what we can use  · Might try different calcium channel blocker but first would like to do her heart cath to exclude any significant coronary disease playing a role    Mixed Hyperlipidemia:  · Patient reports intolerance to statin  therapy  · Lipid panel 2/2021 demonstrates good LDL and total cholesterol on statin therapy but worsened after stopping on repeat check 5/2021  · CMP 5/2021 demonstrates normal ALT and AST    Paroxysmal atrial fibrillation:  · Continue anticoagulation with Xarelto  · Patient stop beta-blocker but does not seem to be bothering her that much    Follow Up:  After heart catheterization      Thank you for allowing me to participate in the care of Domitila Paiz. Feel free to contact me directly with any further questions or concerns.    Carlos A Vasques MD  [facility]  12/30/21  11:27 EST

## 2022-01-03 ENCOUNTER — LAB (OUTPATIENT)
Dept: LAB | Facility: HOSPITAL | Age: 67
End: 2022-01-03

## 2022-01-03 DIAGNOSIS — Z13.6 SCREENING FOR CARDIOVASCULAR CONDITION: ICD-10-CM

## 2022-01-03 DIAGNOSIS — Z01.810 PREPROCEDURAL CARDIOVASCULAR EXAMINATION: ICD-10-CM

## 2022-01-03 DIAGNOSIS — Z01.818 OTHER SPECIFIED PRE-OPERATIVE EXAMINATION: ICD-10-CM

## 2022-01-03 LAB
ANION GAP SERPL CALCULATED.3IONS-SCNC: 9.6 MMOL/L (ref 5–15)
BASOPHILS # BLD AUTO: 0.04 10*3/MM3 (ref 0–0.2)
BASOPHILS NFR BLD AUTO: 0.7 % (ref 0–1.5)
BUN SERPL-MCNC: 13 MG/DL (ref 8–23)
BUN/CREAT SERPL: 18.3 (ref 7–25)
CALCIUM SPEC-SCNC: 9.3 MG/DL (ref 8.6–10.5)
CHLORIDE SERPL-SCNC: 100 MMOL/L (ref 98–107)
CO2 SERPL-SCNC: 28.4 MMOL/L (ref 22–29)
CREAT SERPL-MCNC: 0.71 MG/DL (ref 0.57–1)
DEPRECATED RDW RBC AUTO: 40.1 FL (ref 37–54)
EOSINOPHIL # BLD AUTO: 0.1 10*3/MM3 (ref 0–0.4)
EOSINOPHIL NFR BLD AUTO: 1.8 % (ref 0.3–6.2)
ERYTHROCYTE [DISTWIDTH] IN BLOOD BY AUTOMATED COUNT: 12.3 % (ref 12.3–15.4)
GFR SERPL CREATININE-BSD FRML MDRD: 82 ML/MIN/1.73
GLUCOSE SERPL-MCNC: 158 MG/DL (ref 65–99)
HCT VFR BLD AUTO: 37.6 % (ref 34–46.6)
HGB BLD-MCNC: 12.6 G/DL (ref 12–15.9)
IMM GRANULOCYTES # BLD AUTO: 0.02 10*3/MM3 (ref 0–0.05)
IMM GRANULOCYTES NFR BLD AUTO: 0.4 % (ref 0–0.5)
LYMPHOCYTES # BLD AUTO: 1.85 10*3/MM3 (ref 0.7–3.1)
LYMPHOCYTES NFR BLD AUTO: 32.4 % (ref 19.6–45.3)
MCH RBC QN AUTO: 29.7 PG (ref 26.6–33)
MCHC RBC AUTO-ENTMCNC: 33.5 G/DL (ref 31.5–35.7)
MCV RBC AUTO: 88.7 FL (ref 79–97)
MONOCYTES # BLD AUTO: 0.6 10*3/MM3 (ref 0.1–0.9)
MONOCYTES NFR BLD AUTO: 10.5 % (ref 5–12)
NEUTROPHILS NFR BLD AUTO: 3.1 10*3/MM3 (ref 1.7–7)
NEUTROPHILS NFR BLD AUTO: 54.2 % (ref 42.7–76)
NRBC BLD AUTO-RTO: 0 /100 WBC (ref 0–0.2)
PLATELET # BLD AUTO: 280 10*3/MM3 (ref 140–450)
PMV BLD AUTO: 9 FL (ref 6–12)
POTASSIUM SERPL-SCNC: 3.7 MMOL/L (ref 3.5–5.2)
RBC # BLD AUTO: 4.24 10*6/MM3 (ref 3.77–5.28)
SARS-COV-2 ORF1AB RESP QL NAA+PROBE: NOT DETECTED
SODIUM SERPL-SCNC: 138 MMOL/L (ref 136–145)
WBC NRBC COR # BLD: 5.71 10*3/MM3 (ref 3.4–10.8)

## 2022-01-03 PROCEDURE — C9803 HOPD COVID-19 SPEC COLLECT: HCPCS

## 2022-01-03 PROCEDURE — U0004 COV-19 TEST NON-CDC HGH THRU: HCPCS

## 2022-01-03 PROCEDURE — 36415 COLL VENOUS BLD VENIPUNCTURE: CPT

## 2022-01-03 PROCEDURE — 80048 BASIC METABOLIC PNL TOTAL CA: CPT

## 2022-01-03 PROCEDURE — 85025 COMPLETE CBC W/AUTO DIFF WBC: CPT

## 2022-01-03 PROCEDURE — U0005 INFEC AGEN DETEC AMPLI PROBE: HCPCS

## 2022-01-04 ENCOUNTER — HOSPITAL ENCOUNTER (OUTPATIENT)
Facility: HOSPITAL | Age: 67
Setting detail: HOSPITAL OUTPATIENT SURGERY
Discharge: HOME | End: 2022-01-04
Attending: INTERNAL MEDICINE | Admitting: INTERNAL MEDICINE

## 2022-01-04 VITALS
DIASTOLIC BLOOD PRESSURE: 58 MMHG | OXYGEN SATURATION: 96 % | WEIGHT: 125 LBS | SYSTOLIC BLOOD PRESSURE: 108 MMHG | BODY MASS INDEX: 25.2 KG/M2 | RESPIRATION RATE: 16 BRPM | TEMPERATURE: 98.4 F | HEART RATE: 62 BPM | HEIGHT: 59 IN

## 2022-01-04 DIAGNOSIS — I25.118 CORONARY ARTERY DISEASE OF NATIVE ARTERY OF NATIVE HEART WITH STABLE ANGINA PECTORIS: ICD-10-CM

## 2022-01-04 DIAGNOSIS — R07.9 CHEST PAIN, UNSPECIFIED TYPE: ICD-10-CM

## 2022-01-04 PROCEDURE — C1894 INTRO/SHEATH, NON-LASER: HCPCS | Performed by: INTERNAL MEDICINE

## 2022-01-04 PROCEDURE — 25010000002 HEPARIN (PORCINE) PER 1000 UNITS: Performed by: INTERNAL MEDICINE

## 2022-01-04 PROCEDURE — 99152 MOD SED SAME PHYS/QHP 5/>YRS: CPT | Performed by: INTERNAL MEDICINE

## 2022-01-04 PROCEDURE — 25010000002 FENTANYL CITRATE (PF) 50 MCG/ML SOLUTION: Performed by: INTERNAL MEDICINE

## 2022-01-04 PROCEDURE — 0 IOPAMIDOL PER 1 ML: Performed by: INTERNAL MEDICINE

## 2022-01-04 PROCEDURE — C1769 GUIDE WIRE: HCPCS | Performed by: INTERNAL MEDICINE

## 2022-01-04 PROCEDURE — 93458 L HRT ARTERY/VENTRICLE ANGIO: CPT | Performed by: INTERNAL MEDICINE

## 2022-01-04 PROCEDURE — 25010000002 MIDAZOLAM PER 1 MG: Performed by: INTERNAL MEDICINE

## 2022-01-04 RX ORDER — LIDOCAINE HYDROCHLORIDE 20 MG/ML
INJECTION, SOLUTION INFILTRATION; PERINEURAL AS NEEDED
Status: DISCONTINUED | OUTPATIENT
Start: 2022-01-04 | End: 2022-01-04 | Stop reason: HOSPADM

## 2022-01-04 RX ORDER — SODIUM CHLORIDE 9 MG/ML
75 INJECTION, SOLUTION INTRAVENOUS CONTINUOUS
Status: DISCONTINUED | OUTPATIENT
Start: 2022-01-04 | End: 2022-01-04 | Stop reason: HOSPADM

## 2022-01-04 RX ORDER — MULTIPLE VITAMINS W/ MINERALS TAB 9MG-400MCG
1 TAB ORAL DAILY
COMMUNITY

## 2022-01-04 RX ORDER — SODIUM CHLORIDE 0.9 % (FLUSH) 0.9 %
10 SYRINGE (ML) INJECTION AS NEEDED
Status: DISCONTINUED | OUTPATIENT
Start: 2022-01-04 | End: 2022-01-04 | Stop reason: HOSPADM

## 2022-01-04 RX ORDER — ACETAMINOPHEN 325 MG/1
650 TABLET ORAL EVERY 4 HOURS PRN
Status: DISCONTINUED | OUTPATIENT
Start: 2022-01-04 | End: 2022-01-04 | Stop reason: HOSPADM

## 2022-01-04 RX ORDER — SODIUM CHLORIDE 9 MG/ML
100 INJECTION, SOLUTION INTRAVENOUS CONTINUOUS
Status: ACTIVE | OUTPATIENT
Start: 2022-01-04 | End: 2022-01-04

## 2022-01-04 RX ORDER — SODIUM CHLORIDE 0.9 % (FLUSH) 0.9 %
3 SYRINGE (ML) INJECTION EVERY 12 HOURS SCHEDULED
Status: DISCONTINUED | OUTPATIENT
Start: 2022-01-04 | End: 2022-01-04 | Stop reason: HOSPADM

## 2022-01-04 RX ORDER — FENTANYL CITRATE 50 UG/ML
INJECTION, SOLUTION INTRAMUSCULAR; INTRAVENOUS AS NEEDED
Status: DISCONTINUED | OUTPATIENT
Start: 2022-01-04 | End: 2022-01-04 | Stop reason: HOSPADM

## 2022-01-04 RX ORDER — MIDAZOLAM HYDROCHLORIDE 1 MG/ML
INJECTION INTRAMUSCULAR; INTRAVENOUS AS NEEDED
Status: DISCONTINUED | OUTPATIENT
Start: 2022-01-04 | End: 2022-01-04 | Stop reason: HOSPADM

## 2022-01-04 RX ORDER — RANOLAZINE 500 MG/1
500 TABLET, EXTENDED RELEASE ORAL 2 TIMES DAILY
Qty: 60 TABLET | Refills: 3 | Status: SHIPPED | OUTPATIENT
Start: 2022-01-04 | End: 2022-06-15

## 2022-01-04 RX ADMIN — SODIUM CHLORIDE 75 ML/HR: 9 INJECTION, SOLUTION INTRAVENOUS at 09:32

## 2022-01-04 RX ADMIN — ACETAMINOPHEN 650 MG: 325 TABLET, FILM COATED ORAL at 12:00

## 2022-01-04 NOTE — DISCHARGE INSTRUCTIONS
[facility]  [address] [place] [address]    Coronary Angiogram (Radial/Ulnar Approach) After Care    Refer to this sheet in the next few weeks. These instructions provide you with information on caring for yourself after your procedure. Your caregiver may also give you more specific instructions. Your treatment has been planned according to current medical practices, but problems sometimes occur. Call your caregiver if you have any problems or questions after your procedure.    Home Care Instructions:  · You may shower the day after the procedure. Remove the bandage (dressing) and gently wash the site with plain soap and water. Gently pat the site dry. You may apply a band aid daily for 2 days if desired.    · Do not apply powder or lotion to the site.  · Do not submerge the affected site in water for 3 to 5 days or until the site is completely healed.   · Do not lift, push or pull anything over 5 pounds for 5 days after your procedure or as directed by your physician.  As a reference, a gallon of milk weighs 8 pounds.   · Inspect the site at least twice daily. You may notice some bruising at the site and it may be tender for 1 to 2 weeks.     · Increase your fluid intake for the next 2 days.    · Keep arm elevated for 24 hours. For the remainder of the day, keep your arm in “Pledge of Allegiance” position when up and about.     · You may drive 24 hours after the procedure unless otherwise instructed by your caregiver.  · Do not operate machinery or power tools for 24 hours.  · A responsible adult should be with you for the first 24 hours after you arrive home. Do not make any important legal decisions or sign legal papers for 24 hours.  Do not drink alcohol for 24 hours.    · Metformin or any medications containing Metformin should not be taken for 48 hours after your procedure.      Call Your Doctor if:   · You have unusual pain at the radial/ulnar (wrist) site.  · You have redness, warmth,  swelling, or pain at the radial/ulnar (wrist) site.  · You have drainage (other than a small amount of blood on the dressing).  · `You have chills or a fever > 101.  · Your arm becomes pale or dark, cool, tingly, or numb.  · You develop chest pain, shortness of breath, feel faint or pass out.    · You have heavy bleeding from the site, hold pressure on the site for 20 minutes.  If the bleeding stops, apply a fresh bandage and call your cardiologist.  However, if you        continue to have bleeding, call 911 and continue to apply pressure to the site.   · You have any symptoms of a stroke.  Remember BE FAST  · B-balance. Sudden trouble walking or loss of balance.  · E-eyes.  Sudden changes in how you see or a sudden onset of a very bad headache.   · F-face. Sudden weakness or loss of feeling of the face or facial droop on one side.   · A-arms Sudden weakness or numbness in one arm.  One arm drifts down if they are both held out in front of you. This happens suddenly and usually on one side of the body.   · S-speech.  Sudden trouble speaking, slurred speech or trouble understanding what are saying.   · T-time  Time to call emergency services.  Write down the symptoms and the time they started.

## 2022-01-27 ENCOUNTER — OFFICE VISIT (OUTPATIENT)
Dept: CARDIOLOGY | Facility: CLINIC | Age: 67
End: 2022-01-27

## 2022-01-27 VITALS
HEIGHT: 59 IN | SYSTOLIC BLOOD PRESSURE: 150 MMHG | DIASTOLIC BLOOD PRESSURE: 86 MMHG | WEIGHT: 129.4 LBS | BODY MASS INDEX: 26.08 KG/M2 | HEART RATE: 70 BPM

## 2022-01-27 DIAGNOSIS — I48.0 PAROXYSMAL ATRIAL FIBRILLATION: ICD-10-CM

## 2022-01-27 DIAGNOSIS — I25.118 CORONARY ARTERY DISEASE OF NATIVE ARTERY OF NATIVE HEART WITH STABLE ANGINA PECTORIS: Primary | ICD-10-CM

## 2022-01-27 DIAGNOSIS — I10 ESSENTIAL HYPERTENSION: ICD-10-CM

## 2022-01-27 DIAGNOSIS — E78.2 MIXED HYPERLIPIDEMIA: ICD-10-CM

## 2022-01-27 PROCEDURE — 99214 OFFICE O/P EST MOD 30 MIN: CPT | Performed by: INTERNAL MEDICINE

## 2022-01-27 PROCEDURE — 93000 ELECTROCARDIOGRAM COMPLETE: CPT | Performed by: INTERNAL MEDICINE

## 2022-01-27 RX ORDER — CHLORTHALIDONE 25 MG/1
25 TABLET ORAL DAILY
Qty: 90 TABLET | Refills: 3 | Status: SHIPPED | OUTPATIENT
Start: 2022-01-27

## 2022-01-27 RX ORDER — HYDROCHLOROTHIAZIDE 12.5 MG/1
12.5 TABLET ORAL DAILY
COMMUNITY
End: 2022-01-27

## 2022-01-27 NOTE — PROGRESS NOTES
[facility] Follow Up Office Note     Encounter Date:22  Patient:Domitila Paiz  :1955  MRN:[id number]      Chief Complaint:   Chief Complaint   Patient presents with   • Follow-up post heart cath     History of Presenting Illness:      Ms. Paiz is a 66 y.o. woman past medical history notable for coronary artery disease status post percutaneous intervention, hypertension, mixed hyperlipidemia, and chronic back pain related orthopedic issues status post surgeries who presents to our office for follow up.  Since her last appointment with us she did undergo a cardiac catheterization which demonstrated normal coronary arteries and patent stent however we had some concerns for possible microvascular disease and she was started on Ranexa.  Since starting this medication she is doing fantastic.  She is actually lost weight.  She does notice a odd taste in her mouth which is the only side effect from this medication but otherwise she is doing great.  She has more energy and overall is doing well.  She is still having high blood pressures at home.  She is only taken 25 mg of hydralazine for her morning dose because it does give her some dizziness.  Otherwise doing well    Review of Systems:  Review of Systems   Constitutional: Negative.   Eyes: Negative.    Cardiovascular: Positive for chest pain.   Respiratory: Positive for shortness of breath.    Endocrine: Negative.    Hematologic/Lymphatic: Negative.    Skin: Negative.    Musculoskeletal: Negative.    Gastrointestinal: Negative.    Genitourinary: Negative.    Neurological: Negative.    Psychiatric/Behavioral: Negative.    Allergic/Immunologic: Negative.          Current Outpatient Medications on File Prior to Visit   Medication Sig Dispense Refill   • clopidogrel (PLAVIX) 75 MG tablet Take 1 tablet by mouth Daily. 90 tablet 3   • hydrALAZINE (APRESOLINE) 50 MG tablet Take 50 mg by mouth Every Night. 25MG IN AM  50 MG IN PM     • multivitamin  with minerals (VISION VITAMINS PO) Take 1 tablet by mouth Daily.     • nitroglycerin (NITROSTAT) 0.4 MG SL tablet Place 0.4 mg under the tongue Every 5 (Five) Minutes As Needed.     • ranolazine (Ranexa) 500 MG 12 hr tablet Take 1 tablet by mouth 2 (Two) Times a Day. 60 tablet 3   • rivaroxaban (XARELTO) 15 MG tablet Take 15 mg by mouth Daily.     • [DISCONTINUED] hydroCHLOROthiazide (HYDRODIURIL) 12.5 MG tablet Take 12.5 mg by mouth Daily.     • [DISCONTINUED] minocycline (MINOCIN,DYNACIN) 50 MG capsule Take 50 mg by mouth Daily. with food       No current facility-administered medications on file prior to visit.         Allergies   Allergen Reactions   • Wasp Venom Anaphylaxis   • Amlodipine Swelling   • Lisinopril Cough   • Morphine Other (See Comments) and Hives     Pt states she almost od'd in the hosp on this years ago   • Poison Ivy Extract Hives   • Valsartan Other (See Comments)   • Betadine [Povidone Iodine] Rash     REDNESS   • Butorphanol Itching, Rash and Hives   • Other Rash     Burn cream       Past Medical History:   Diagnosis Date   • Asthma    • Coronary artery disease    • Heart attack (HCC)    • Hyperlipidemia    • Hypertension        Past Surgical History:   Procedure Laterality Date   • CARDIAC CATHETERIZATION     • CARDIAC CATHETERIZATION N/A 1/4/2022    Procedure: Left Heart Cath;  Surgeon: Carlos A Vasques MD;  Location: [facility] CATH INVASIVE LOCATION;  Service: Cardiovascular;  Laterality: N/A;   • CARDIAC CATHETERIZATION N/A 1/4/2022    Procedure: Coronary angiography;  Surgeon: Carlos A Vasques MD;  Location:  [facility] CATH INVASIVE LOCATION;  Service: Cardiovascular;  Laterality: N/A;   • COLONOSCOPY  2012    Dr. Lam   • CORONARY STENT PLACEMENT      X3   • HAND RECONSTRUCTION Right     S/P FALL   • HYSTERECTOMY         Social History     Socioeconomic History   • Marital status:    Tobacco Use   • Smoking status: Never Smoker   • Smokeless tobacco: Never Used   Substance and  "Sexual Activity   • Alcohol use: No     Comment: caffeine use    • Drug use: No   • Sexual activity: Yes     Partners: Male     Birth control/protection: Surgical       Family History   Problem Relation Age of Onset   • Hypertension Mother        The following portions of the patient's history were reviewed and updated as appropriate: allergies, current medications, past family history, past medical history, past social history, past surgical history and problem list.       Objective:       Vitals:    01/27/22 0904   BP: 150/86   BP Location: Right arm   Patient Position: Sitting   Pulse: 70   Weight: 58.7 kg (129 lb 6.4 oz)   Height: 149.9 cm (59\")     Body mass index is 26.14 kg/m².     Physical Exam:  Constitutional: Well appearing, well developed, no acute distress   HENT: Oropharynx clear and membrane moist  Eyes: Normal conjunctiva, no sclera icterus.  Neck: Supple, no carotid bruit bilaterally.  Cardiovascular: Regular rate and rhythm, No Murmur, No bilateral lower extremity edema.  Pulmonary: Normal respiratory effort, normal lung sounds, no wheezing.  Abdominal: Soft, nontender, no hepatosplenomegaly, liver is non-pulsatile.  Neurological: Alert and orient x 3.   Skin: Warm, dry, no ecchymosis, no rash.  Psych: Appropriate mood and affect. Normal judgment and insight.      Lab Results   Component Value Date    GLUCOSE 158 (H) 01/03/2022    BUN 13 01/03/2022    CREATININE 0.71 01/03/2022    EGFRIFNONA 82 01/03/2022    BCR 18.3 01/03/2022    K 3.7 01/03/2022    CO2 28.4 01/03/2022    CALCIUM 9.3 01/03/2022    ALBUMIN 4.20 05/03/2021    LABIL2 1.5 01/22/2021    AST 22 05/03/2021    ALT 20 05/03/2021       Lab Results   Component Value Date    WBC 5.71 01/03/2022    HGB 12.6 01/03/2022    HCT 37.6 01/03/2022    MCV 88.7 01/03/2022     01/03/2022       Lab Results   Component Value Date    TROPONINI 0.092 (H) 11/02/2020    TROPONINT <0.010 05/03/2021       Lab Results   Component Value Date    CHOL 111 " 02/05/2021    CHLPL 214 (H) 03/14/2019    CHLPL 208 (H) 07/13/2018    CHLPL 200 (H) 06/20/2018     Lab Results   Component Value Date    TRIG 107 02/05/2021    TRIG 60 03/14/2019    TRIG 87 07/13/2018     Lab Results   Component Value Date    HDL 58 02/05/2021    HDL 69 03/14/2019    HDL 65 07/13/2018     Lab Results   Component Value Date    LDL 33 02/05/2021     (H) 03/14/2019     (H) 07/13/2018       Lab Results   Component Value Date    TSH 0.878 11/18/2020       ECG 12 Lead    Date/Time: 1/27/2022 9:47 AM  Performed by: Carlos A Vasques MD  Authorized by: Carlos A Vasques MD   Comparison: compared with previous ECG from 1/4/2022  Similar to previous ECG  Rhythm: sinus rhythm        Cardiac catheterization 1/4/2022:  · No significant epicardial evidence of coronary artery disease with patent stents within the proximal and mid LAD with no significant in-stent restenosis.  · Normal left ventricular filling pressures of 5 mmHg with no significant bradycardia across aortic valve    Stress MPI 5/18/2021:  · Findings consistent with an indeterminate ECG stress test.  · Left ventricular ejection fraction is normal. (Calculated EF = 64%).  · Myocardial perfusion imaging indicates a normal myocardial perfusion study with no evidence of ischemia.  · Impressions are consistent with a low risk study.  · There is no prior study available for comparison.    Event Monitor 5/12/2021:  · An abnormal monitor study.  · Underlying heart rhythm was sinus rhythm.  · Paroxysmal atrial fibrillation noted during study at a burden of less than 1%.  · 3-second pause noted on conversion from atrial fibrillation to sinus rhythm.  · Symptoms of fatigue and chest pain correlated with sinus rhythm.    Holter Monitor 2/11/2021:  · A normal monitor study.  · Underlying heart rhythm was sinus rhythm with an average heart rate of 79 bpm and a range of 55 bpm up to 110 bpm  · No diary submitted or symptoms reported during  study    Cardiac catheterization 11/3/2020:  · Left main: This gives rise to the LAD and left circumflex. The left main is free of disease.  · LAD: This gives rise to a single diagonal fairly distally. It terminates at the apex. The stent in the proximal vessel is widely patent. Just prior to the diagonal branch there is a focal somewhat hazy 75% stenosis focally  · LCx: This gives rise to 2 moderate marginal is a small terminal marginal. The left circumflex is free of disease.  · RCA: This is dominant giving rise to the PDA and a posterolateral left ventricular branch both of which are large. These extend out to the ape with up to 20% narrowing in the ostium of the PDA x. The right coronary has luminal irregularity  · Successful percutaneous intervention to mid LAD with placement of 2.25 x 12 mm resolute Kt drug-eluting stent    Cardiac catheterization 10/30/2020:  · No change from cardiac catheterization earlier in the day    Cardiac catheterization 10/30/2020:  · Left Main: The left main is a large caliber vessel with a normal take off from the left coronary cusp that bifurcates to form a left anterior descending artery and a left circumflex artery. There is no angiographically significant coronary artery disease noted.  · Left anterior descending artery:The LAD is a medium caliber vessel. There is a non-calcified underfilled segment proximally followed by sequential calcified lesions 50-75% with a focal mid >75% lesion. There is a focal mid calcified 50% stenosis. There is a small caliber high takeoff diagonal and a moderate sized mid/distal takeoff diagonal 2 with no significant disease  · Left circumflex artery: The circumflex is a medium caliber, non-dominant vessel. There is a mid trifurcation where OM1 and OM2 takeoff from a small caliber AV groove branch. There is a motion artifact mid OM1/2 that is uninterpretable, these are moderate caliber vessels with no significant disease  · Right coronary artery:  The RCA is a large caliber, dominant vessel. It has a normal takeoff from the right coronary cusp. The RCA terminates as a PDA and right posterolateral branch. There are focal <25% calcified stenoses in the mid RCA  · Left Ventricle: The ventricular cavity size is within normal limits. There are no stigmata of prior infarction. There is no abnormal filling defect. LVEF is estimated at 55%.  · Successful implantation of a 2.5 x18 mm resolute Kt drug-eluting stent to proximal LAD stenoses    Cardiac CTA/16/2020:  · Total calcium score 125.   · Normal coronary origins and courses.    · Severe proximal LAD disease with sequential lesions, maximum >75%        Assessment:          Diagnosis Plan   1. 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 Paroxysmal atrial fibrillation (HCC)     3. Essential hypertension  Basic Metabolic Panel   4. Mixed hyperlipidemia            Plan:       Ms. Paiz is a 66 y.o. woman past medical history notable for coronary artery disease status post percutaneous intervention, hypertension, mixed hyperlipidemia, and chronic back pain related orthopedic issues status post surgeries who presents to our office for scheduled follow-up.  Overall patient doing well.  I would like to try and change her hydrochlorothiazide over to chlorthalidone to see if this will do a better job with controlling her blood pressure.  Another option would be considering adding spironolactone to her regimen but would like to see how she first does with changing her thiazide diuretic around.  We will get a repeat BMP in 1 week to ensure stability of her creatinine clearance and electrolytes.  Can follow-up on her blood pressure at that time.  Otherwise we will plan on seeing her back in the office in 3 months.    Coronary artery disease with stable angina:  · Continue with clopidogrel and Xarelto  · Patient had stopped atenolol unclear why but patient does  have a lot of intolerances to medications.  · Patient states her myalgias are better off of statin therapy  · Had a edema and low BP with amlodipine  · Patient had headaches with long-acting isosorbide mononitrate    · Continue Ranexa as has helped out significantly with chest pain    Hypertension:  · Still not reaching goal will change hydrochlorothiazide to chlorthalidone  · She has a lot of intolerances almost to every other blood pressure medication were somewhat limited in what we can use  · Follow-up on repeat BMP  · Could add spironolactone in the future if need be for better blood pressure control    Mixed Hyperlipidemia:  · Patient reports intolerance to statin therapy  · Lipid panel 2/2021 demonstrates good LDL and total cholesterol on statin therapy but worsened after stopping on repeat check 5/2021  · CMP 5/2021 demonstrates normal ALT and AST    Paroxysmal atrial fibrillation:  · Continue anticoagulation with Xarelto  · Patient stop beta-blocker but does not seem to be bothering her that much    Follow Up:  3 months       Thank you for allowing me to participate in the care of Domitila Paiz. Feel free to contact me directly with any further questions or concerns.    Carlos A Vasques MD  [facility]  01/27/22  09:48 EST

## 2022-02-11 RX ORDER — CLOPIDOGREL BISULFATE 75 MG/1
TABLET ORAL
Qty: 90 TABLET | Refills: 3 | Status: SHIPPED | OUTPATIENT
Start: 2022-02-11 | End: 2022-03-29

## 2022-03-29 RX ORDER — CLOPIDOGREL BISULFATE 75 MG/1
TABLET ORAL
Qty: 90 TABLET | Refills: 3 | Status: SHIPPED | OUTPATIENT
Start: 2022-03-29 | End: 2023-02-20

## 2022-05-02 ENCOUNTER — LAB (OUTPATIENT)
Dept: LAB | Facility: HOSPITAL | Age: 67
End: 2022-05-02

## 2022-05-02 ENCOUNTER — OFFICE VISIT (OUTPATIENT)
Dept: CARDIOLOGY | Facility: CLINIC | Age: 67
End: 2022-05-02

## 2022-05-02 VITALS
DIASTOLIC BLOOD PRESSURE: 78 MMHG | HEIGHT: 59 IN | BODY MASS INDEX: 25.64 KG/M2 | HEART RATE: 80 BPM | WEIGHT: 127.2 LBS | SYSTOLIC BLOOD PRESSURE: 130 MMHG

## 2022-05-02 DIAGNOSIS — I10 ESSENTIAL HYPERTENSION: ICD-10-CM

## 2022-05-02 DIAGNOSIS — I25.118 CORONARY ARTERY DISEASE OF NATIVE ARTERY OF NATIVE HEART WITH STABLE ANGINA PECTORIS: Primary | ICD-10-CM

## 2022-05-02 DIAGNOSIS — I48.0 PAROXYSMAL ATRIAL FIBRILLATION: ICD-10-CM

## 2022-05-02 LAB
ANION GAP SERPL CALCULATED.3IONS-SCNC: 14 MMOL/L (ref 5–15)
BUN SERPL-MCNC: 11 MG/DL (ref 8–23)
BUN/CREAT SERPL: 14.5 (ref 7–25)
CALCIUM SPEC-SCNC: 9.1 MG/DL (ref 8.6–10.5)
CHLORIDE SERPL-SCNC: 101 MMOL/L (ref 98–107)
CO2 SERPL-SCNC: 27 MMOL/L (ref 22–29)
CREAT SERPL-MCNC: 0.76 MG/DL (ref 0.57–1)
EGFRCR SERPLBLD CKD-EPI 2021: 86.5 ML/MIN/1.73
GLUCOSE SERPL-MCNC: 90 MG/DL (ref 65–99)
POTASSIUM SERPL-SCNC: 3.5 MMOL/L (ref 3.5–5.2)
SODIUM SERPL-SCNC: 142 MMOL/L (ref 136–145)

## 2022-05-02 PROCEDURE — 36415 COLL VENOUS BLD VENIPUNCTURE: CPT

## 2022-05-02 PROCEDURE — 93000 ELECTROCARDIOGRAM COMPLETE: CPT | Performed by: NURSE PRACTITIONER

## 2022-05-02 PROCEDURE — 80048 BASIC METABOLIC PNL TOTAL CA: CPT

## 2022-05-02 PROCEDURE — 99214 OFFICE O/P EST MOD 30 MIN: CPT | Performed by: NURSE PRACTITIONER

## 2022-05-02 RX ORDER — OMEPRAZOLE 40 MG/1
40 CAPSULE, DELAYED RELEASE ORAL DAILY
COMMUNITY

## 2022-05-02 RX ORDER — HYDROCHLOROTHIAZIDE 12.5 MG/1
12.5 TABLET ORAL DAILY
COMMUNITY
End: 2022-05-02 | Stop reason: ALTCHOICE

## 2022-05-02 RX ORDER — EPINEPHRINE NASAL SOLUTION 1 MG/ML
0.5 SOLUTION NASAL AS NEEDED
COMMUNITY

## 2022-05-02 RX ORDER — RIZATRIPTAN BENZOATE 10 MG/1
10 TABLET ORAL ONCE AS NEEDED
COMMUNITY

## 2022-05-02 NOTE — PROGRESS NOTES
Date of Office Visit: 2022  Encounter Provider: GIOVANNY Montero  Place of Service: [facility] CARDIOLOGY  Patient Name: Domitila Paiz  :1955    Chief Complaint   Patient presents with   • Coronary Artery Disease   :     HPI: Domitila Paiz is a 66 y.o. female.  She is a patient of Dr. Vasques's whom we follow for the management of hypertension, hyperlipidemia, paroxysmal atrial fibrillation, and coronary artery disease.  In May 2021, she wore a Holter monitor demonstrating sinus rhythm, atrial fibrillation burden less than 1%, and a 3-second pause noted on conversion from atrial fibrillation to sinus.  She was started on anticoagulation.     In January of this year, she was referred for repeat cardiac catheterization for continued complaints of dyspnea on exertion and chest pain.  This demonstrated patent stents within the proximal and mid LAD with no significant in-stent restenosis.  Medical therapy was recommended, and she was started on Ranexa.  Of note, she has failed multiple antianginal regimens in the past.   She was last seen in the office by Dr. Vasques in January at which time she was doing well on the Ranexa.  She reported a significant improvement in her chest pain.  However, she did note an odd taste in her mouth.  Overall, Dr. Vasques felt she was doing well.  He switched her HCTZ to chlorthalidone for better blood pressure control.  She was advised to follow-up in 3 months.   Overall, she has been doing well.  She denies any shortness of breath, edema, dizziness, syncope, bleeding difficulties or melena.  She reports one episode of chest pain while cooking which occurred on .  The entire event lasted approximately half an hour.  She did note feeling a little weird at the time and her blood pressure monitor picked up an irregular heart rhythm.  On average, her blood pressures have been in the high 130s and 140s.  She is requesting  additional Xarelto samples.  The Xarelto would cost her around $500 a month which is not affordable.  Of note, it appears she has been taking 15 mg of Xarelto.  She started out on the 20 mg and is unsure when or why the switch was made.  She is looking to get back into exercise soon.    Past Medical History:   Diagnosis Date   • Asthma    • Coronary artery disease    • Heart attack (HCC)    • Hyperlipidemia    • Hypertension        Past Surgical History:   Procedure Laterality Date   • CARDIAC CATHETERIZATION     • CARDIAC CATHETERIZATION N/A 1/4/2022    Procedure: Left Heart Cath;  Surgeon: Carlos A Vasques MD;  Location:  [facility] CATH INVASIVE LOCATION;  Service: Cardiovascular;  Laterality: N/A;   • CARDIAC CATHETERIZATION N/A 1/4/2022    Procedure: Coronary angiography;  Surgeon: Carlos A Vasques MD;  Location:  [facility] CATH INVASIVE LOCATION;  Service: Cardiovascular;  Laterality: N/A;   • COLONOSCOPY  2012    Dr. Lam   • CORONARY STENT PLACEMENT      X3   • HAND RECONSTRUCTION Right     S/P FALL   • HYSTERECTOMY         Social History     Socioeconomic History   • Marital status:    Tobacco Use   • Smoking status: Never Smoker   • Smokeless tobacco: Never Used   Substance and Sexual Activity   • Alcohol use: No     Comment: caffeine use    • Drug use: No   • Sexual activity: Yes     Partners: Male     Birth control/protection: Surgical       Family History   Problem Relation Age of Onset   • Hypertension Mother        Review of Systems   Constitutional: Negative.   Cardiovascular: Positive for chest pain and palpitations. Negative for dyspnea on exertion, leg swelling, orthopnea, paroxysmal nocturnal dyspnea and syncope.   Respiratory: Positive for shortness of breath.    Hematologic/Lymphatic: Negative for bleeding problem.   Musculoskeletal: Negative for falls.   Gastrointestinal: Negative for melena.   Neurological: Negative for dizziness and light-headedness.       Allergies   Allergen Reactions  "  • Wasp Venom Anaphylaxis   • Amlodipine Swelling   • Lisinopril Cough   • Morphine Other (See Comments) and Hives     Pt states she almost od'd in the hosp on this years ago   • Poison Ivy Extract Hives   • Valsartan Other (See Comments)   • Betadine [Povidone Iodine] Rash     REDNESS   • Butorphanol Itching, Rash and Hives   • Other Rash     Burn cream         Current Outpatient Medications:   •  chlorthalidone (HYGROTON) 25 MG tablet, Take 1 tablet by mouth Daily., Disp: 90 tablet, Rfl: 3  •  clopidogrel (PLAVIX) 75 MG tablet, Take 1 tablet by mouth once daily, Disp: 90 tablet, Rfl: 3  •  EPINEPHrine (ADRENALIN) 0.1 % nasal solution, 0.5 mL into the nostril(s) as directed by provider As Needed., Disp: , Rfl:   •  hydrALAZINE (APRESOLINE) 50 MG tablet, Take 50 mg by mouth Every Night. 25MG IN AM 50 MG IN PM, Disp: , Rfl:   •  multivitamin with minerals tablet tablet, Take 1 tablet by mouth Daily., Disp: , Rfl:   •  nitroglycerin (NITROSTAT) 0.4 MG SL tablet, Place 0.4 mg under the tongue Every 5 (Five) Minutes As Needed., Disp: , Rfl:   •  omeprazole (priLOSEC) 40 MG capsule, Take 40 mg by mouth Daily., Disp: , Rfl:   •  ranolazine (Ranexa) 500 MG 12 hr tablet, Take 1 tablet by mouth 2 (Two) Times a Day., Disp: 60 tablet, Rfl: 3  •  rivaroxaban (XARELTO) 20 MG tablet, Take 1 tablet by mouth Daily., Disp: 30 tablet, Rfl: 11  •  rizatriptan (MAXALT) 10 MG tablet, Take 10 mg by mouth 1 (One) Time As Needed for Migraine. May repeat in 2 hours if needed, Disp: , Rfl:       Objective:     Vitals:    05/02/22 0830   BP: 130/78   BP Location: Left arm   Patient Position: Sitting   Pulse: 80   Weight: 57.7 kg (127 lb 3.2 oz)   Height: 149.9 cm (59\")     Body mass index is 25.69 kg/m².    PHYSICAL EXAM:    Neck:      Vascular: No JVD.   Pulmonary:      Effort: Pulmonary effort is normal.      Breath sounds: Normal breath sounds.   Cardiovascular:      Normal rate. Regular rhythm.      Murmurs: There is no murmur.      No " gallop. No click. No rub.   Pulses:     Intact distal pulses.           ECG 12 Lead    Date/Time: 5/2/2022 8:43 AM  Performed by: Stefania Bey APRN  Authorized by: Stefania Bey APRN   Comparison: compared with previous ECG from 1/27/2022  Similar to previous ECG  Rhythm: sinus rhythm  Rate: normal  BPM: 80  T inversion: V1  T flattening: V2  Other findings: non-specific ST-T wave changes  Comments: Indication: Coronary artery disease              Assessment:       Diagnosis Plan   1. 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 Paroxysmal atrial fibrillation (HCC)     3. Essential hypertension       Orders Placed This Encounter   Procedures   • ECG 12 Lead     This order was created via procedure documentation     Order Specific Question:   Release to patient     Answer:   Immediate          Plan:       1.  Coronary artery disease.  History of LAD stenting.  Cardiac catheterization from January demonstrated patent stents with no evidence of restenosis or other significant disease.  She was started on Ranexa with improvement.  More than likely her recent episode of chest pain was related to atrial fibrillation.  I did not think it was anginal.  Continue clopidogrel and Ranexa.      2.  Paroxysmal atrial fibrillation.  She is in sinus rhythm today.  She is anticoagulated on Xarelto.  However, it appears she is underdosed.  As such, I recommended increasing the dose to 20 mg.  I provided her with a new prescription along with a 30-day free trial card as she has never used one before.  Unfortunately, we do not have any samples at the moment.  Hopefully we will have samples soon.  In the meantime, I will have my medical assistant look into the issue with the Xarelto as it should not be costing her this much money.      3.  Hypertension.  Since starting the chlorthalidone, her blood pressures are mildly improved.  However, I think we could  achieve even better numbers.  I advised her to go to by lab today.  Assuming her labs are stable, I will start her on spironolactone.      Further recommendations will be made pending the results of her labs today.  Otherwise, she will follow-up with Dr. Vasques in 6 months.      As always, it has been a pleasure to participate in your patient's care.      Sincerely,         GIOVANNY Mercado

## 2022-05-18 ENCOUNTER — TELEPHONE (OUTPATIENT)
Dept: CARDIOLOGY | Facility: CLINIC | Age: 67
End: 2022-05-18

## 2022-05-18 NOTE — TELEPHONE ENCOUNTER
Okay to proceed with injection.  She may hold her blood thinners both Xarelto and clopidogrel for 3 days prior to procedure

## 2022-05-18 NOTE — TELEPHONE ENCOUNTER
Received paperwork from [facility]. Patient is needing cardiac clearance to have a left sacroiliac joint injection. She is scheduled  on 06/03/22.     Can patient proceed? If so, how many days prior can she stop her blood thinners?  She is currently taking PLAVIX and Xarelto.     Their office can be reached at [phone]. Fax number [phone]

## 2022-06-15 RX ORDER — RANOLAZINE 500 MG/1
TABLET, EXTENDED RELEASE ORAL
Qty: 60 TABLET | Refills: 3 | Status: SHIPPED | OUTPATIENT
Start: 2022-06-15 | End: 2023-01-17

## 2022-07-21 ENCOUNTER — TELEPHONE (OUTPATIENT)
Dept: CARDIOLOGY | Facility: CLINIC | Age: 67
End: 2022-07-21

## 2022-07-21 NOTE — TELEPHONE ENCOUNTER
Patient called stating that she is having some trouble with her medications. She is not sure which medication that is causing her problems. She is bruising easily and a lot of bleeding. She also mentioned she is losing weight.     She can be reached at [phone]

## 2022-07-27 NOTE — TELEPHONE ENCOUNTER
Called pt again to see if she is having the same problem. She stated that she is still having a lot of bruising. She tends to bleed for about 2 hours or more. She stated that since she saw Stefania back in May. Her dosage was increased from 15 mg to 20 mg of Xarelto. She's been have this issue since then.     She also stated that she has lost a lot of weight as well.     She can be reached at [phone]

## 2022-07-27 NOTE — TELEPHONE ENCOUNTER
Called patient back and given that she is on clopidogrel her correct dosing would actually be 15 mg daily not 20 mg daily.  Would recommend going back to this dose.  She was asking if we could find some samples for her due to cost I also sent in a prescription.

## 2022-09-13 RX ORDER — HYDRALAZINE HYDROCHLORIDE 50 MG/1
50 TABLET, FILM COATED ORAL 2 TIMES DAILY
Qty: 180 TABLET | Refills: 3 | Status: SHIPPED | OUTPATIENT
Start: 2022-09-13

## 2022-09-17 ENCOUNTER — APPOINTMENT (OUTPATIENT)
Dept: CT IMAGING | Facility: HOSPITAL | Age: 67
End: 2022-09-17

## 2022-09-17 ENCOUNTER — HOSPITAL ENCOUNTER (EMERGENCY)
Facility: HOSPITAL | Age: 67
Discharge: HOME | End: 2022-09-17
Attending: EMERGENCY MEDICINE | Admitting: EMERGENCY MEDICINE

## 2022-09-17 ENCOUNTER — APPOINTMENT (OUTPATIENT)
Dept: GENERAL RADIOLOGY | Facility: HOSPITAL | Age: 67
End: 2022-09-17

## 2022-09-17 VITALS
HEART RATE: 84 BPM | OXYGEN SATURATION: 100 % | TEMPERATURE: 98.5 F | SYSTOLIC BLOOD PRESSURE: 143 MMHG | DIASTOLIC BLOOD PRESSURE: 85 MMHG | RESPIRATION RATE: 18 BRPM

## 2022-09-17 DIAGNOSIS — S09.90XA MINOR CLOSED HEAD INJURY: ICD-10-CM
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
## 2022-09-17 DIAGNOSIS — Z79.01 ANTICOAGULATED: ICD-10-CM

## 2022-09-17 DIAGNOSIS — S20.211A CONTUSION OF RIGHT CHEST WALL, INITIAL ENCOUNTER: Primary | ICD-10-CM

## 2022-09-17 PROCEDURE — 71250 CT THORAX DX C-: CPT

## 2022-09-17 PROCEDURE — 71101 X-RAY EXAM UNILAT RIBS/CHEST: CPT

## 2022-09-17 PROCEDURE — 99283 EMERGENCY DEPT VISIT LOW MDM: CPT

## 2022-09-17 PROCEDURE — 70450 CT HEAD/BRAIN W/O DYE: CPT

## 2022-09-17 RX ORDER — HYDROCODONE BITARTRATE AND ACETAMINOPHEN 7.5; 325 MG/1; MG/1
1 TABLET ORAL ONCE
Status: COMPLETED | OUTPATIENT
Start: 2022-09-17 | End: 2022-09-17

## 2022-09-17 RX ORDER — HYDROCODONE BITARTRATE AND ACETAMINOPHEN 5; 325 MG/1; MG/1
1 TABLET ORAL EVERY 6 HOURS PRN
Qty: 10 TABLET | Refills: 0 | Status: SHIPPED | OUTPATIENT
Start: 2022-09-17 | End: 2022-11-07 | Stop reason: ALTCHOICE

## 2022-09-17 RX ADMIN — HYDROCODONE BITARTRATE AND ACETAMINOPHEN 1 TABLET: 7.5; 325 TABLET ORAL at 16:28

## 2022-09-17 NOTE — ED TRIAGE NOTES
.Pt masked on arrival, RN wearing mask/goggles during encounter    Pt reports trip and fall landing on concrete, pain to rt ribs and head, denies loc, takes Xarelto

## 2022-09-17 NOTE — ED PROVIDER NOTES
EMERGENCY DEPARTMENT ENCOUNTER    Room Number:  36/36  Date of encounter:  9/17/2022  PCP: Melissa Billy APRN  Historian: Patient     I used full protective equipment while examining this patient.  This includes face mask, gloves and protective eyewear.  I washed my hands before entering the room and immediately upon leaving the room.  Patient was wearing a surgical mask.      HPI:  Chief Complaint: Fall  A complete HPI/ROS/PMH/PSH/SH/FH are unobtainable due to: None    Context: Domitila Paiz is a 66 y.o. female who presents to the ED by private vehicle c/o a fall that occurred just prior to arrival.  Patient was at the park with her granddaughter.  She was lifting her granddaughter up onto a slide when she stepped into a hole and fell.  She fell backwards and hit her head and landed on the right side of her chest.  Denies loss consciousness or feeling dazed.  She complains of headache, right rib pain, and right thigh pain.  She takes Xarelto.  Denies nausea, vomiting, neck pain, back pain, abdominal pain, shortness of breath, or numbness/tingling/weakness in her extremities.  Pain is currently moderate.  Patient has been able to ambulate since falling.      PAST MEDICAL HISTORY  Active Ambulatory Problems     Diagnosis Date Noted   • Coronary artery disease of native artery of native heart with stable angina pectoris (HCC) 11/11/2020   • Mixed hyperlipidemia 11/11/2020   • Essential hypertension 11/11/2020   • Dizziness 02/05/2021   • Near syncope 02/05/2021   • Paroxysmal atrial fibrillation (HCC) 05/06/2021   • Chest pain 12/30/2021     Resolved Ambulatory Problems     Diagnosis Date Noted   • No Resolved Ambulatory Problems     Past Medical History:   Diagnosis Date   • Asthma    • Coronary artery disease    • Heart attack (HCC)    • Hyperlipidemia    • Hypertension          PAST SURGICAL HISTORY  Past Surgical History:   Procedure Laterality Date   • CARDIAC CATHETERIZATION     • CARDIAC  CATHETERIZATION N/A 1/4/2022    Procedure: Left Heart Cath;  Surgeon: Carlos A Vasques MD;  Location:  [facility]AS[facility];  Service: Cardiovascular;  Laterality: N/A;   • CARDIAC CATHETERIZATION N/A 1/4/2022    Procedure: Coronary angiography;  Surgeon: Carlos A Vasques MD;  Location:  [facility];  Service: Cardiovascular;  Laterality: N/A;   • COLONOSCOPY  2012    Dr. Lam   • CORONARY STENT PLACEMENT      X3   • HAND RECONSTRUCTION Right     S/P FALL   • HYSTERECTOMY           FAMILY HISTORY  Family History   Problem Relation Age of Onset   • Hypertension Mother          SOCIAL HISTORY  Social History     Socioeconomic History   • Marital status:    Tobacco Use   • Smoking status: Never Smoker   • Smokeless tobacco: Never Used   Substance and Sexual Activity   • Alcohol use: No     Comment: caffeine use    • Drug use: No   • Sexual activity: Yes     Partners: Male     Birth control/protection: Surgical         ALLERGIES  Wasp venom, Amlodipine, Lisinopril, Morphine, Poison ivy extract, Valsartan, Betadine [povidone iodine], Butorphanol, and Other       REVIEW OF SYSTEMS  Review of Systems      All systems have been reviewed and are negative except as as discussed in the HPI    PHYSICAL EXAM    I have reviewed the triage vital signs and nursing notes.    ED Triage Vitals   Temp Heart Rate Resp BP SpO2   09/17/22 1430 09/17/22 1430 09/17/22 1430 09/17/22 1525 09/17/22 1430   98.5 °F (36.9 °C) 105 18 153/77 98 %      Temp src Heart Rate Source Patient Position BP Location FiO2 (%)   -- -- -- -- --              Physical Exam  GENERAL: Awake, alert, oriented x3.  Well-developed, well-nourished female.  Resting comfortably in no acute distress  HENT: NCAT, nares patent, moist mucous membranes  NECK: C-spine is nontender  EYES: Extraocular muscles intact, no scleral icterus  CV: regular rhythm, regular rate  RESPIRATORY: normal effort, clear to auscultation bilaterally  ABDOMEN:  soft, nontender, no CVA tenderness MUSCULOSKELETAL: There is bruising on the right lateral lower chest wall.  There is tenderness of the right anterior and lateral chest wall.  No crepitus.  There is soft tissue tenderness over the right thigh.  No bony tenderness of the extremities.  Full range of motion all extremities.  T-spine and L-spine are nontender  NEURO: Speech is normal.  No facial droop.  Follows commands.  Normal strength and light touch sensation all extremities.  GCS 15.  SKIN: warm, dry, no rash  PSYCH: Normal mood and affect      LAB RESULTS  No results found for this or any previous visit (from the past 24 hour(s)).    Ordered the above labs and independently reviewed the results.      RADIOLOGY  XR Ribs Right With PA Chest    Result Date: 9/17/2022  4 VIEW RIGHT RIBS AND ONE VIEW PA CHEST  HISTORY: Fell. Right rib pain.  FINDINGS: The lungs are well-expanded and clear. The heart is borderline enlarged without change from 05/03/2021. Multiple views of the right ribs show no definite fracture at the present time.  This report was finalized on 9/17/2022 3:26 PM by Dr. Sergio Mercado M.D.      CT Head Without Contrast    Result Date: 9/17/2022  CT SCAN OF THE BRAIN WITHOUT CONTRAST  HISTORY: Fell with trauma to right side of head. On anticoagulation.  The CT scan was performed as an emergency procedure through the brain without contrast. The ventricles are normal in size and midline. There is a small calcification in the region of the right putamen unchanged from 05/03/2021. There is no evidence of acute intracranial hemorrhage or mass effect. The visualized sinuses are clear and the mastoid air cells are clear.      Radiation dose reduction techniques were utilized, including automated exposure control and exposure modulation based on body size.  This report was finalized on 9/17/2022 4:21 PM by Dr. Sergio Mercado M.D.      CT Chest Without Contrast Diagnostic    Result Date: 9/17/2022  CT SCAN OF  THE CHEST WITHOUT CONTRAST  HISTORY: Fell. Right rib pain.  FINDINGS: The CT scan was performed as an emergency procedure through the chest without contrast. The following findings are present: 1. The lungs are well-expanded with a calcified granuloma at the right base medially. There is a clustering of small nodules and some tree-in-bud nodularity in the right upper lobe anteriorly with nodules measuring up to 7 mm. These are new since a chest CT scan dating back to 03/22/2014 and this area is at least partially included on a CT scan of the soft tissue neck dated 01/06/2020 and the nodules were not present at that time. I suspect this represents focal inflammatory change but a short-term followup CT scan in 3-4 months is recommended to assess short-term stability. 2. There is no mediastinal or hilar or axillary adenopathy. There is a minimal trace pericardial effusion unchanged from 2014. The CT images through the upper liver, spleen, both adrenal glands, and upper poles of both kidneys are unremarkable. 3. At bone windows, no fracture is seen with particular reference to the right ribs. There is no compression fracture in the visualized thoracic and upper lumbar spine or sternum.    Radiation dose reduction techniques were utilized, including automated exposure control and exposure modulation based on body size.  This report was finalized on 9/17/2022 5:03 PM by Dr. Sergio Mercado M.D.        I ordered the above noted radiological studies. Reviewed by me and discussed with radiologist.  See dictation for official radiology interpretation.      PROCEDURES  Procedures      MEDICATIONS GIVEN IN ER    Medications   HYDROcodone-acetaminophen (NORCO) 7.5-325 MG per tablet 1 tablet (1 tablet Oral Given 9/17/22 1628)         PROGRESS, DATA ANALYSIS, CONSULTS, AND MEDICAL DECISION MAKING    All labs have been independently reviewed by me.  All radiology studies have been reviewed by me and discussed with radiologist  dictating the report.   EKG's independently viewed and interpreted by me.  I have reviewed the nurse's notes, vital signs, past medical history, and medication list.  Discussion below represents my analysis of pertinent findings related to patient's condition, differential diagnosis, treatment plan and final disposition.      ED Course as of 09/17/22 2108   Sat Sep 17, 2022   1600 Right rib x-rays interpreted by the radiologist.  Images independently viewed by me.  There are no fractures.  No pneumothorax. [WH]   1630 Head CT is negative acute. [WH]   1636 Results of the CT chest discussed with Dr. Mercado.  Images and [place] viewed by me.  There are a few nodules in the right upper lobe which are felt to be inflammatory.  Follow-up CT is recommended in 3 to 4 months.  There are no rib fractures. [WH]   1729 Imaging results were discussed with the patient and her .  I informed them of the need for a repeat chest CT in 3 to 4 months.  Her pain has improved.  She remains awake, alert, and oriented x3.  She will be discharged with a prescription for [place].  Return precautions were discussed. [WH]      ED Course User Index  [WH] Rl Albarran MD       AS OF 21:08 EDT VITALS:    BP - 143/85  HR - 84  TEMP - 98.5 °F (36.9 °C)  O2 SATS - 100%      DIAGNOSIS  Final diagnoses:   Contusion of right chest wall, initial encounter   Minor closed head injury   Anticoagulated   Fall, initial encounter         DISPOSITION  DISCHARGE    Patient discharged in stable condition.    Reviewed implications of results, diagnosis, meds, responsibility to follow up, warning signs and symptoms of possible worsening, potential complications and reasons to return to ER, including worsening symptoms, trouble breathing, fever, nausea, vomiting, dizziness, confusion, or other concern..    Patient/Family voiced understanding of above instructions.    Discussed plan for discharge, as there is no emergent indication for admission.  Patient referred to primary care provider for BP management due to today's BP. Pt/family is agreeable and understands need for follow up and repeat testing.  Pt is aware that discharge does not mean that nothing is wrong but it indicates no emergency is present that requires admission and they must continue care with follow-up as given below or physician of their choice.     FOLLOW-UP  Melissa Billy, APRN  [address] [facility]  [facility] [id number] [phone]    Schedule an appointment as soon as possible for a visit   If symptoms persist         Medication List      New Prescriptions    HYDROcodone-acetaminophen 5-325 MG per tablet  Commonly known as: NORCO  Take 1 tablet by mouth Every 6 (Six) Hours As Needed for Moderate Pain.           Where to Get Your Medications      These medications were sent to [address] - [address] [facility] - [phone]  - [phone] FX  [address] [facility] [address]    Phone: [phone]   · HYDROcodone-acetaminophen 5-325 MG per tablet           Dictated utilizing Dragon dictation     Rl Albarran MD  09/17/22 6101

## 2022-09-17 NOTE — ED NOTES
Pt to ED s/p trip and fall at park with granddaughter, fell onto R rib area, bruising noted. Painful inspiration.     Pt wearing face mask during their stay in ER. This RN wore appropriate ppe while providing patient care.     No lac noted, no bleeding noted. Pt is on xarelto

## 2022-09-17 NOTE — DISCHARGE INSTRUCTIONS
Take medication as prescribed.  Apply cool compresses to affected area as needed.  Return to the emergency department for worsening pain, persistent headache, nausea, vomiting, dizziness, confusion, shortness of breath, or other concern.  Cough and/or take deep breaths at least once an hour while awake.  You will need to have a follow-up CT scan of your chest in the next 3 to 4 months.  Your primary care provider can order this.

## 2022-10-31 ENCOUNTER — APPOINTMENT (OUTPATIENT)
Dept: GENERAL RADIOLOGY | Facility: HOSPITAL | Age: 67
End: 2022-10-31

## 2022-10-31 ENCOUNTER — HOSPITAL ENCOUNTER (EMERGENCY)
Facility: HOSPITAL | Age: 67
Discharge: HOME | End: 2022-10-31
Attending: EMERGENCY MEDICINE | Admitting: EMERGENCY MEDICINE

## 2022-10-31 ENCOUNTER — APPOINTMENT (OUTPATIENT)
Dept: CT IMAGING | Facility: HOSPITAL | Age: 67
End: 2022-10-31

## 2022-10-31 VITALS
RESPIRATION RATE: 18 BRPM | DIASTOLIC BLOOD PRESSURE: 80 MMHG | SYSTOLIC BLOOD PRESSURE: 158 MMHG | HEIGHT: 59 IN | BODY MASS INDEX: 25.69 KG/M2 | OXYGEN SATURATION: 97 % | HEART RATE: 80 BPM | TEMPERATURE: 98.5 F

## 2022-10-31 DIAGNOSIS — S39.012A STRAIN OF LUMBAR REGION, INITIAL ENCOUNTER: ICD-10-CM

## 2022-10-31 DIAGNOSIS — S00.83XA CONTUSION OF OTHER PART OF HEAD, INITIAL ENCOUNTER: Primary | ICD-10-CM

## 2022-10-31 DIAGNOSIS — S16.1XXA ACUTE STRAIN OF NECK MUSCLE, INITIAL ENCOUNTER: ICD-10-CM

## 2022-10-31 DIAGNOSIS — S60.229A CONTUSION OF HAND, UNSPECIFIED LATERALITY, INITIAL ENCOUNTER: ICD-10-CM

## 2022-10-31 PROCEDURE — 70450 CT HEAD/BRAIN W/O DYE: CPT

## 2022-10-31 PROCEDURE — 72125 CT NECK SPINE W/O DYE: CPT

## 2022-10-31 PROCEDURE — 99283 EMERGENCY DEPT VISIT LOW MDM: CPT

## 2022-10-31 PROCEDURE — 72110 X-RAY EXAM L-2 SPINE 4/>VWS: CPT

## 2022-10-31 PROCEDURE — 73120 X-RAY EXAM OF HAND: CPT

## 2022-10-31 RX ORDER — HYDROCODONE BITARTRATE AND ACETAMINOPHEN 7.5; 325 MG/1; MG/1
1 TABLET ORAL ONCE
Status: COMPLETED | OUTPATIENT
Start: 2022-10-31 | End: 2022-10-31

## 2022-10-31 RX ADMIN — HYDROCODONE BITARTRATE AND ACETAMINOPHEN 1 TABLET: 7.5; 325 TABLET ORAL at 22:08

## 2022-11-01 NOTE — ED PROVIDER NOTES
EMERGENCY DEPARTMENT ENCOUNTER    Room Number:  A02/02  PCP: Melissa Billy APRN  Historian: Patient  History Limited By: Nothing      HPI  Chief Complaint: Fall  Context: Domitila Paiz is a 66 y.o. female who presents to the ED c/o fall.  Patient states she was walking and tripped falling and hitting her head.  Patient states she is on blood thinners.  Did not lose consciousness.  Patient has some neck pain.  Has bilateral hand pain and lower back pain.  Denies chest pain or hip pain.      Location: Head injury  Radiation: N/A  Character: Aching  Duration: 7 PM  Severity: Moderate  Progression: Worsening  Aggravating Factors: Nothing  Alleviating Factors: Nothing        MEDICAL RECORD REVIEW    Patient here for injury September 2022          PAST MEDICAL HISTORY  Active Ambulatory Problems     Diagnosis Date Noted   • Coronary artery disease of native artery of native heart with stable angina pectoris (HCC) 11/11/2020   • Mixed hyperlipidemia 11/11/2020   • Essential hypertension 11/11/2020   • Dizziness 02/05/2021   • Near syncope 02/05/2021   • Paroxysmal atrial fibrillation (HCC) 05/06/2021   • Chest pain 12/30/2021     Resolved Ambulatory Problems     Diagnosis Date Noted   • No Resolved Ambulatory Problems     Past Medical History:   Diagnosis Date   • Asthma    • Coronary artery disease    • Heart attack (HCC)    • Hyperlipidemia    • Hypertension          PAST SURGICAL HISTORY  Past Surgical History:   Procedure Laterality Date   • CARDIAC CATHETERIZATION     • CARDIAC CATHETERIZATION N/A 1/4/2022    Procedure: Left Heart Cath;  Surgeon: Carlos A Vasques MD;  Location: [facility] CATH INVASIVE LOCATION;  Service: Cardiovascular;  Laterality: N/A;   • CARDIAC CATHETERIZATION N/A 1/4/2022    Procedure: Coronary angiography;  Surgeon: Carlos A Vasques MD;  Location:  [facility]ATH INVAS[facility] LOCATION;  Service: Cardiovascular;  Laterality: N/A;   • COLONOSCOPY  2012    Dr. Lam   • CORONARY STENT  PLACEMENT      X3   • HAND RECONSTRUCTION Right     S/P FALL   • HYSTERECTOMY           FAMILY HISTORY  Family History   Problem Relation Age of Onset   • Hypertension Mother          SOCIAL HISTORY  Social History     Socioeconomic History   • Marital status:    Tobacco Use   • Smoking status: Never   • Smokeless tobacco: Never   Substance and Sexual Activity   • Alcohol use: No     Comment: caffeine use    • Drug use: No   • Sexual activity: Yes     Partners: Male     Birth control/protection: Surgical         ALLERGIES  Wasp venom, Amlodipine, Lisinopril, Morphine, Poison ivy extract, Valsartan, Betadine [povidone iodine], Butorphanol, and Other        REVIEW OF SYSTEMS  Review of Systems   Constitutional: Negative for fever.   HENT: Negative for sore throat.    Eyes: Negative.    Respiratory: Negative for cough and shortness of breath.    Cardiovascular: Negative for chest pain.   Gastrointestinal: Negative for abdominal pain, diarrhea and vomiting.   Genitourinary: Negative for dysuria.   Musculoskeletal: Positive for arthralgias and neck pain.   Skin: Negative for rash.   Allergic/Immunologic: Negative.    Neurological: Positive for headaches. Negative for weakness and numbness.   Hematological: Negative.    Psychiatric/Behavioral: Negative.    All other systems reviewed and are negative.           PHYSICAL EXAM  ED Triage Vitals   Temp Heart Rate Resp BP SpO2   10/31/22 2013 10/31/22 2013 10/31/22 2013 10/31/22 2015 10/31/22 2013   98.5 °F (36.9 °C) 98 18 161/100 95 %      Temp src Heart Rate Source Patient Position BP Location FiO2 (%)   -- -- -- -- --              Physical Exam  Vitals and nursing note reviewed.   Constitutional:       General: She is not in acute distress.  HENT:      Head: Normocephalic.      Comments: Tenderness to left forehead  Eyes:      Extraocular Movements: EOM normal.      Pupils: Pupils are equal, round, and reactive to light.   Cardiovascular:      Rate and Rhythm: Normal  rate and regular rhythm.      Heart sounds: Normal heart sounds.   Pulmonary:      Effort: Pulmonary effort is normal. No respiratory distress.      Breath sounds: Normal breath sounds.   Abdominal:      Palpations: Abdomen is soft.      Tenderness: There is no abdominal tenderness. There is no guarding or rebound.   Musculoskeletal:         General: Tenderness present. No edema. Normal range of motion.      Cervical back: Tenderness present.      Comments: Bilateral hand tenderness and lumbar spine tenderness   Skin:     General: Skin is warm and dry.      Findings: No rash.   Neurological:      Mental Status: She is alert and oriented to person, place, and time.      Sensory: Sensation is intact. No sensory deficit.      Motor: Motor strength is normal. No weakness.   Psychiatric:         Mood and Affect: Mood and affect normal.       Patient was wearing a face mask when I entered the room and they continued to wear a mask throughout their stay in the ED.  I wore PPE, including  gloves, face mask with shield or face mask with goggles whenever I was in the room with patient.       LAB RESULTS  No results found for this or any previous visit (from the past 24 hour(s)).    Ordered the above labs and reviewed the results.        RADIOLOGY  CT Head Without Contrast   Final Result   No acute intracranial findings.       Radiation dose reduction techniques were utilized, including automated   exposure control and exposure modulation based on body size.       This report was finalized on 10/31/2022 9:45 PM by Dr. Gris Odonnell M.D.          CT Cervical Spine Without Contrast   Final Result   No acute fracture or subluxation identified.       Radiation dose reduction techniques were utilized, including automated   exposure control and exposure modulation based on body size.       This report was finalized on 10/31/2022 9:50 PM by Dr. Gris Odonnell M.D.          XR Hand 2 View Bilateral   Final Result   No acute  fracture or subluxation identified.        This report was finalized on 10/31/2022 9:07 PM by Dr. Gris Odonnell M.D.          XR Spine Lumbar Complete 4+VW   Final Result   No acute fracture or subluxation identified.       This report was finalized on 10/31/2022 9:12 PM by Dr. Gris Odonnell M.D.               Ordered the above noted radiological studies. Reviewed by me in PACS.            PROCEDURES  Procedures          MEDICATIONS GIVEN IN ER  Medications   HYDROcodone-acetaminophen (NORCO) 7.5-325 MG per tablet 1 tablet (1 tablet Oral Given 10/31/22 2208)             PROGRESS AND CONSULTS  ED Course as of 10/31/22 2227   Mon Oct 31, 2022   2156 21:56 EDT  Patient presents for evaluation after fall.  Patient CT head and CT C-spine show no evidence of fracture or bleeding.  Patient's x-ray of lumbar spine and hands are negative for fracture.  Patient will be discharged home.  Will follow-up with primary doctor. [SL]      ED Course User Index  [SL] Rc Allison MD           MEDICAL DECISION MAKING      MDM  Number of Diagnoses or Management Options     Amount and/or Complexity of Data Reviewed  Tests in the radiology section of CPT®: reviewed and ordered (CT head, CT C-spine, x-rays negative)               DIAGNOSIS  Final diagnoses:   Contusion of other part of head, initial encounter   Acute strain of neck muscle, initial encounter   Strain of lumbar region, initial encounter   Contusion of hand, unspecified laterality, initial encounter           DISPOSITION  DISCHARGE    Patient discharged in stable condition.    Reviewed implications of results, diagnosis, meds, responsibility to follow up, warning signs and symptoms of possible worsening, potential complications and reasons to return to ER, including worsening symptoms.    Patient/Family voiced understanding of above instructions.    Discussed plan for discharge, as there is no emergent indication for admission. Patient referred to primary  care provider for BP management due to today's BP. Pt/family is agreeable and understands need for follow up and repeat testing.  Pt is aware that discharge does not mean that nothing is wrong but it indicates no emergency is present that requires admission and they must continue care with follow-up as given below or physician of their choice.     FOLLOW-UP  Melissa Billy, APRN  [address] [facility] [address]  [phone]    Schedule an appointment as soon as possible for a visit            Medication List      No changes were made to your prescriptions during this visit.             Latest Documented Vital Signs:  As of 22:27 EDT  BP- 158/80 HR- 80 Temp- 98.5 °F (36.9 °C) O2 sat- 97%                         Rc Allison MD  10/31/22 2501

## 2022-11-01 NOTE — ED NOTES
Pt states that she was trick or treating when she fell. Pt tripped over uneven part in the sidewalk. Pt reports hitting head. Denies loc. Reports blood thinners. Reports neck and lower back pain    This RN wore mask and goggles during time of contact

## 2022-11-01 NOTE — ED TRIAGE NOTES
Pt to ER from home via PV with c/o fall, head pain. Pt states she was out trick or treating and she tripped and fell, striking her head. Pt denies LOC but does report taking xarelto. Pt c/o pain to the L side of her head. Pt ambulatory without difficulty to triage.      Pt masked in triage, staff in appropriate ppe.

## 2022-11-07 ENCOUNTER — OFFICE VISIT (OUTPATIENT)
Dept: CARDIOLOGY | Facility: CLINIC | Age: 67
End: 2022-11-07

## 2022-11-07 VITALS
HEART RATE: 86 BPM | WEIGHT: 122.8 LBS | BODY MASS INDEX: 24.76 KG/M2 | HEIGHT: 59 IN | DIASTOLIC BLOOD PRESSURE: 70 MMHG | OXYGEN SATURATION: 97 % | SYSTOLIC BLOOD PRESSURE: 130 MMHG

## 2022-11-07 DIAGNOSIS — I25.118 CORONARY ARTERY DISEASE OF NATIVE ARTERY OF NATIVE HEART WITH STABLE ANGINA PECTORIS: Primary | ICD-10-CM

## 2022-11-07 DIAGNOSIS — I48.0 PAROXYSMAL ATRIAL FIBRILLATION: ICD-10-CM

## 2022-11-07 DIAGNOSIS — I10 ESSENTIAL HYPERTENSION: ICD-10-CM

## 2022-11-07 PROCEDURE — 93000 ELECTROCARDIOGRAM COMPLETE: CPT | Performed by: NURSE PRACTITIONER

## 2022-11-07 PROCEDURE — 99214 OFFICE O/P EST MOD 30 MIN: CPT | Performed by: NURSE PRACTITIONER

## 2022-11-07 NOTE — PROGRESS NOTES
Date of Office Visit: 2022  Encounter Provider: GIOVANNY Montero  Place of Service: [facility] CARDIOLOGY  Patient Name: Domitila Paiz  :1955    Chief Complaint   Patient presents with   • Coronary Artery Disease   :     HPI: Domitila Paiz is a 66 y.o. female.   She is a patient of Dr. Vasques's whom we follow for the management of hypertension, hyperlipidemia, paroxysmal atrial fibrillation, and coronary artery disease.  In May 2021, she wore a Holter monitor demonstrating sinus rhythm, atrial fibrillation burden less than 1%, and a 3-second pause noted on conversion from atrial fibrillation to sinus.  She was started on anticoagulation.                In January of this year, she was referred for repeat cardiac catheterization for continued complaints of dyspnea on exertion and chest pain.  This demonstrated patent stents within the proximal and mid LAD with no significant in-stent restenosis.  Medical therapy was recommended, and she was started on Ranexa.  Of note, she has failed multiple antianginal regimens in the past.   I last saw her in the office in May at which time she was doing well.  She reported 1 episode of chest pain while cooking which lasted approximately half an hour.  I did increase the dose of Xarelto to 20 mg.  However, she developed became bruising on this dose and it was recommended she resume the 15 mg dosing.  She is here today for 6-month follow-up.   Overall, does not appear there has been a significant change.  She continues reporting inconsistent chest pain and dyspnea on exertion that is overall improved from earlier this year.  It does not occur daily or even weekly.  Reportedly when her systolic blood pressure is in the 120s she feels weak.  She thought this was related to the ranolazine so she will occasionally only take half a dose at night which seems to help her feel better.  She reports infrequent palpitations.  Her  biggest issue as of recently is profound fatigue.  In fact, she has an appointment with her PCP on Wednesday to discuss this.  She is also not sleeping well.  She is actually fallen twice since September.  She attributes this to vision issues with her bifocals.  She reports swelling and pain in her right ankle and foot.  She does report a history of gout and wonders if it could be related.  She denies any dizziness, syncope, or melena.  She continues to suffer from bruising.    Past Medical History:   Diagnosis Date   • Asthma    • Coronary artery disease    • Heart attack (HCC)    • Hyperlipidemia    • Hypertension        Past Surgical History:   Procedure Laterality Date   • CARDIAC CATHETERIZATION     • CARDIAC CATHETERIZATION N/A 1/4/2022    Procedure: Left Heart Cath;  Surgeon: Carlos A Vasques MD;  Location:  [facility];  Service: Cardiovascular;  Laterality: N/A;   • CARDIAC CATHETERIZATION N/A 1/4/2022    Procedure: Coronary angiography;  Surgeon: Carlos A Vasques MD;  Location:  [facility];  Service: Cardiovascular;  Laterality: N/A;   • COLONOSCOPY  2012    Dr. aLm   • CORONARY STENT PLACEMENT      X3   • HAND RECONSTRUCTION Right     S/P FALL   • HYSTERECTOMY         Social History     Socioeconomic History   • Marital status:    Tobacco Use   • Smoking status: Never   • Smokeless tobacco: Never   Substance and Sexual Activity   • Alcohol use: No     Comment: caffeine use    • Drug use: No   • Sexual activity: Yes     Partners: Male     Birth control/protection: Surgical       Family History   Problem Relation Age of Onset   • Hypertension Mother        Review of Systems   Constitutional: Positive for malaise/fatigue.   Cardiovascular: Positive for chest pain, leg swelling and palpitations. Negative for dyspnea on exertion, orthopnea, paroxysmal nocturnal dyspnea and syncope.   Respiratory: Negative.    Hematologic/Lymphatic: Negative for bleeding problem.  "  Musculoskeletal: Negative for falls.   Gastrointestinal: Negative for melena.   Neurological: Negative for dizziness and light-headedness.   Psychiatric/Behavioral: The patient has insomnia.        Allergies   Allergen Reactions   • Wasp Venom Anaphylaxis   • Amlodipine Swelling   • Lisinopril Cough   • Morphine Other (See Comments) and Hives     Pt states she almost od'd in the hosp on this years ago   • Poison Ivy Extract Hives   • Valsartan Other (See Comments)   • Betadine [Povidone Iodine] Rash     REDNESS   • Butorphanol Itching, Rash and Hives   • Other Rash     Burn cream         Current Outpatient Medications:   •  chlorthalidone (HYGROTON) 25 MG tablet, Take 1 tablet by mouth Daily., Disp: 90 tablet, Rfl: 3  •  clopidogrel (PLAVIX) 75 MG tablet, Take 1 tablet by mouth once daily, Disp: 90 tablet, Rfl: 3  •  EPINEPHrine (ADRENALIN) 0.1 % nasal solution, 0.5 mL into the nostril(s) as directed by provider As Needed., Disp: , Rfl:   •  hydrALAZINE (APRESOLINE) 50 MG tablet, Take 1 tablet by mouth 2 (Two) Times a Day., Disp: 180 tablet, Rfl: 3  •  multivitamin with minerals tablet tablet, Take 1 tablet by mouth Daily., Disp: , Rfl:   •  nitroglycerin (NITROSTAT) 0.4 MG SL tablet, Place 0.4 mg under the tongue Every 5 (Five) Minutes As Needed., Disp: , Rfl:   •  omeprazole (priLOSEC) 40 MG capsule, Take 40 mg by mouth Daily., Disp: , Rfl:   •  ranolazine (RANEXA) 500 MG 12 hr tablet, Take 1 tablet by mouth twice daily, Disp: 60 tablet, Rfl: 3  •  rivaroxaban (XARELTO) 15 MG tablet, Take 1 tablet by mouth Daily., Disp: 28 tablet, Rfl: 0  •  rizatriptan (MAXALT) 10 MG tablet, Take 10 mg by mouth 1 (One) Time As Needed for Migraine. May repeat in 2 hours if needed, Disp: , Rfl:       Objective:     Vitals:    11/07/22 1109   BP: 130/70   BP Location: Left arm   Patient Position: Sitting   Cuff Size: Adult   Pulse: 86   SpO2: 97%   Weight: 55.7 kg (122 lb 12.8 oz)   Height: 149.9 cm (59\")     Body mass index is " 24.8 kg/m².    PHYSICAL EXAM:    Neck:      Vascular: No JVD.   Pulmonary:      Effort: Pulmonary effort is normal.      Breath sounds: Normal breath sounds.   Cardiovascular:      Normal rate. Regular rhythm.      Murmurs: There is no murmur.      No gallop. No click. No rub.   Pulses:     Intact distal pulses.           ECG 12 Lead    Date/Time: 11/7/2022 11:14 AM  Performed by: Stefania Bey APRN  Authorized by: Stefania Bey APRN   Comparison: compared with previous ECG   Rhythm: sinus rhythm  Rate: normal  BPM: 71  T inversion: V1  T flattening: V3 and V2                Assessment:       Diagnosis Plan   1. Coronary artery disease of native artery of native heart with stable angina pectoris (HCC)  ECG 12 Lead      2. Paroxysmal atrial fibrillation (HCC)        3. Essential hypertension          Orders Placed This Encounter   Procedures   • ECG 12 Lead     This order was created via procedure documentation     Order Specific Question:   Release to patient     Answer:   Routine Release          Plan:       1.  Coronary artery disease.  History of LAD stenting.  Cardiac catheterization from January demonstrated patent stents with no evidence of restenosis or other significant disease.  She was started on Ranexa with improvement.    Overall her symptoms seem stable.  Continue clopidogrel and Ranexa.        2.  Paroxysmal atrial fibrillation.  She is in sinus rhythm today.  She is anticoagulated on Xarelto.          3.  Hypertension.    I think her blood pressure is stable.  Her blood pressure log from home demonstrates systolics mostly in the 130s to 150s with occasional readings in the 120s.  I am hesitant to de-escalate her antihypertensive regimen.  I did explain that ranolazine should not be causing low blood pressure.  She could try taking half a dose of hydralazine in the evenings when she feels this way to see if it helps at all.      Overall, I think she is stable.  I do not appreciate much  swelling in her right ankle/foot.  Certainly this could be related to gout.  My suspicion for blood clot is low given that she is on Xarelto.  She will follow-up with her PCP on Wednesday.  Otherwise, she will follow-up with Dr. Vasques in 6 months.      As always, it has been a pleasure to participate in your patient's care.      Sincerely,         GIOVANNY Mercado

## 2023-01-17 RX ORDER — RANOLAZINE 500 MG/1
TABLET, EXTENDED RELEASE ORAL
Qty: 60 TABLET | Refills: 3 | Status: SHIPPED | OUTPATIENT
Start: 2023-01-17

## 2023-02-20 ENCOUNTER — OFFICE VISIT (OUTPATIENT)
Dept: CARDIOLOGY | Facility: CLINIC | Age: 68
End: 2023-02-20
Payer: MEDICARE

## 2023-02-20 VITALS
BODY MASS INDEX: 24.19 KG/M2 | SYSTOLIC BLOOD PRESSURE: 130 MMHG | HEIGHT: 59 IN | WEIGHT: 120 LBS | HEART RATE: 75 BPM | DIASTOLIC BLOOD PRESSURE: 76 MMHG

## 2023-02-20 DIAGNOSIS — I48.0 PAROXYSMAL ATRIAL FIBRILLATION: ICD-10-CM

## 2023-02-20 DIAGNOSIS — I25.118 CORONARY ARTERY DISEASE OF NATIVE ARTERY OF NATIVE HEART WITH STABLE ANGINA PECTORIS: Primary | ICD-10-CM

## 2023-02-20 DIAGNOSIS — E78.2 MIXED HYPERLIPIDEMIA: ICD-10-CM

## 2023-02-20 DIAGNOSIS — I10 ESSENTIAL HYPERTENSION: ICD-10-CM

## 2023-02-20 PROCEDURE — 99214 OFFICE O/P EST MOD 30 MIN: CPT | Performed by: INTERNAL MEDICINE

## 2023-02-20 PROCEDURE — 93000 ELECTROCARDIOGRAM COMPLETE: CPT | Performed by: INTERNAL MEDICINE

## 2023-02-20 RX ORDER — ASPIRIN 81 MG/1
81 TABLET ORAL DAILY
Qty: 90 TABLET | Refills: 3 | Status: SHIPPED | OUTPATIENT
Start: 2023-02-20

## 2023-02-20 NOTE — PROGRESS NOTES
[facility] Follow Up Office Note     Encounter Date:23  Patient:Domitila Paiz  :1955  MRN:[id number]      Chief Complaint:   Chief Complaint   Patient presents with   • Follow-up     Medications     History of Presenting Illness:      Ms. Paiz is a 67 y.o. woman past medical history notable for coronary artery disease status post percutaneous intervention, hypertension, mixed hyperlipidemia, and chronic back pain related orthopedic issues status post surgeries who presents to our office for follow up.  In general she is doing quite well.  Her only complaint is excessive bruising and nuisance bleeding.  She also mentions having occasional episodes where her blood pressure will spike into the 150s/160s and even occasionally into the 180s.  However she will get blood pressures in the 120s and feel lightheaded.  Usually her blood pressures running in the 130s    Review of Systems:  Review of Systems   Constitutional: Negative.   Eyes: Negative.    Cardiovascular: Negative.    Respiratory: Negative.    Endocrine: Negative.    Hematologic/Lymphatic: Negative.    Skin: Negative.    Musculoskeletal: Negative.    Gastrointestinal: Negative.    Genitourinary: Negative.    Neurological: Negative.    Psychiatric/Behavioral: Negative.    Allergic/Immunologic: Negative.          Current Outpatient Medications on File Prior to Visit   Medication Sig Dispense Refill   • chlorthalidone (HYGROTON) 25 MG tablet Take 1 tablet by mouth Daily. 90 tablet 3   • EPINEPHrine (ADRENALIN) 0.1 % nasal solution 0.5 mL into the nostril(s) as directed by provider As Needed.     • hydrALAZINE (APRESOLINE) 50 MG tablet Take 1 tablet by mouth 2 (Two) Times a Day. 180 tablet 3   • multivitamin with minerals tablet tablet Take 1 tablet by mouth Daily.     • nitroglycerin (NITROSTAT) 0.4 MG SL tablet Place 0.4 mg under the tongue Every 5 (Five) Minutes As Needed.     • omeprazole (priLOSEC) 40 MG capsule Take 40 mg by mouth  Daily.     • ranolazine (RANEXA) 500 MG 12 hr tablet Take 1 tablet by mouth twice daily 60 tablet 3   • rivaroxaban (XARELTO) 15 MG tablet Take 1 tablet by mouth Daily. 28 tablet 0   • rizatriptan (MAXALT) 10 MG tablet Take 10 mg by mouth 1 (One) Time As Needed for Migraine. May repeat in 2 hours if needed     • [DISCONTINUED] clopidogrel (PLAVIX) 75 MG tablet Take 1 tablet by mouth once daily 90 tablet 3     No current facility-administered medications on file prior to visit.         Allergies   Allergen Reactions   • Wasp Venom Anaphylaxis   • Amlodipine Swelling   • Lisinopril Cough   • Morphine Other (See Comments) and Hives     Pt states she almost od'd in the hosp on this years ago   • Poison Ivy Extract Hives   • Pravastatin Other (See Comments)     Unable to sleep   • Valsartan Other (See Comments)   • Betadine [Povidone Iodine] Rash     REDNESS   • Butorphanol Itching, Rash and Hives   • Other Rash     Burn cream       Past Medical History:   Diagnosis Date   • Asthma    • Coronary artery disease    • Heart attack (HCC)    • Hyperlipidemia    • Hypertension        Past Surgical History:   Procedure Laterality Date   • CARDIAC CATHETERIZATION     • CARDIAC CATHETERIZATION N/A 1/4/2022    Procedure: Left Heart Cath;  Surgeon: Carlos A Vasques MD;  Location:  [facility];  Service: Cardiovascular;  Laterality: N/A;   • CARDIAC CATHETERIZATION N/A 1/4/2022    Procedure: Coronary angiography;  Surgeon: Carlos A Vasques MD;  Location:  [facility];  Service: Cardiovascular;  Laterality: N/A;   • COLONOSCOPY  2012    Dr. Lam   • CORONARY STENT PLACEMENT      X3   • HAND RECONSTRUCTION Right     S/P FALL   • HYSTERECTOMY         Social History     Socioeconomic History   • Marital status:    Tobacco Use   • Smoking status: Never   • Smokeless tobacco: Never   Substance and Sexual Activity   • Alcohol use: No     Comment: caffeine use    • Drug use: No   • Sexual  "activity: Yes     Partners: Male     Birth control/protection: Surgical       Family History   Problem Relation Age of Onset   • Hypertension Mother        The following portions of the patient's history were reviewed and updated as appropriate: allergies, current medications, past family history, past medical history, past social history, past surgical history and problem list.       Objective:       Vitals:    02/20/23 1251   BP: 130/76   BP Location: Left arm   Patient Position: Sitting   Pulse: 75   Weight: 54.4 kg (120 lb)   Height: 149.9 cm (59\")     Body mass index is 24.24 kg/m².     Physical Exam:  Constitutional: Well appearing, well developed, no acute distress   HENT: Oropharynx clear and membrane moist  Eyes: Normal conjunctiva, no sclera icterus.  Neck: Supple, no carotid bruit bilaterally.  Cardiovascular: Regular rate and rhythm, No Murmur, No bilateral lower extremity edema.  Pulmonary: Normal respiratory effort, normal lung sounds, no wheezing.  Abdominal: Soft, nontender, no hepatosplenomegaly, liver is non-pulsatile.  Neurological: Alert and orient x 3.   Skin: Warm, dry, scattered ecchymosis, no rash.  Psych: Appropriate mood and affect. Normal judgment and insight.      Lab Results   Component Value Date    GLUCOSE 90 05/02/2022    BUN 11 05/02/2022    CREATININE 0.76 05/02/2022    EGFRIFNONA 82 01/03/2022    BCR 14.5 05/02/2022    K 3.5 05/02/2022    CO2 27.0 05/02/2022    CALCIUM 9.1 05/02/2022    ALBUMIN 4.20 05/03/2021    LABIL2 1.5 01/22/2021    AST 22 05/03/2021    ALT 20 05/03/2021       Lab Results   Component Value Date    WBC 5.71 01/03/2022    HGB 12.6 01/03/2022    HCT 37.6 01/03/2022    MCV 88.7 01/03/2022     01/03/2022       Lab Results   Component Value Date    TROPONINI 0.092 (H) 11/02/2020    TROPONINT <0.010 05/03/2021       Lab Results   Component Value Date    CHOL 111 02/05/2021    CHLPL 214 (H) 03/14/2019    CHLPL 208 (H) 07/13/2018    CHLPL 200 (H) 06/20/2018 "     Lab Results   Component Value Date    TRIG 107 02/05/2021    TRIG 60 03/14/2019    TRIG 87 07/13/2018     Lab Results   Component Value Date    HDL 58 02/05/2021    HDL 69 03/14/2019    HDL 65 07/13/2018     Lab Results   Component Value Date    LDL 33 02/05/2021     (H) 03/14/2019     (H) 07/13/2018       Lab Results   Component Value Date    TSH 0.878 11/18/2020       ECG 12 Lead    Date/Time: 2/20/2023 1:07 PM  Performed by: Carlos A Vasques MD  Authorized by: Carlos A Vasques MD   Comparison: compared with previous ECG from 11/7/2022  Similar to previous ECG  Rhythm: sinus rhythm        Cardiac catheterization 1/4/2022:  · No significant epicardial evidence of coronary artery disease with patent stents within the proximal and mid LAD with no significant in-stent restenosis.  · Normal left ventricular filling pressures of 5 mmHg with no significant bradycardia across aortic valve    Stress MPI 5/18/2021:  · Findings consistent with an indeterminate ECG stress test.  · Left ventricular ejection fraction is normal. (Calculated EF = 64%).  · Myocardial perfusion imaging indicates a normal myocardial perfusion study with no evidence of ischemia.  · Impressions are consistent with a low risk study.  · There is no prior study available for comparison.    Event Monitor 5/12/2021:  · An abnormal monitor study.  · Underlying heart rhythm was sinus rhythm.  · Paroxysmal atrial fibrillation noted during study at a burden of less than 1%.  · 3-second pause noted on conversion from atrial fibrillation to sinus rhythm.  · Symptoms of fatigue and chest pain correlated with sinus rhythm.    Holter Monitor 2/11/2021:  · A normal monitor study.  · Underlying heart rhythm was sinus rhythm with an average heart rate of 79 bpm and a range of 55 bpm up to 110 bpm  · No diary submitted or symptoms reported during study    Cardiac catheterization 11/3/2020:  · Left main: This gives rise to the LAD and left  circumflex. The left main is free of disease.  · LAD: This gives rise to a single diagonal fairly distally. It terminates at the apex. The stent in the proximal vessel is widely patent. Just prior to the diagonal branch there is a focal somewhat hazy 75% stenosis focally  · LCx: This gives rise to 2 moderate marginal is a small terminal marginal. The left circumflex is free of disease.  · RCA: This is dominant giving rise to the PDA and a posterolateral left ventricular branch both of which are large. These extend out to the ape with up to 20% narrowing in the ostium of the PDA x. The right coronary has luminal irregularity  · Successful percutaneous intervention to mid LAD with placement of 2.25 x 12 mm resolute [place] drug-eluting stent    Cardiac catheterization 10/30/2020:  · No change from cardiac catheterization earlier in the day    Cardiac catheterization 10/30/2020:  · Left Main: The left main is a large caliber vessel with a normal take off from the left coronary cusp that bifurcates to form a left anterior descending artery and a left circumflex artery. There is no angiographically significant coronary artery disease noted.  · Left anterior descending artery:The LAD is a medium caliber vessel. There is a non-calcified underfilled segment proximally followed by sequential calcified lesions 50-75% with a focal mid >75% lesion. There is a focal mid calcified 50% stenosis. There is a small caliber high takeoff diagonal and a moderate sized mid/distal takeoff diagonal 2 with no significant disease  · Left circumflex artery: The circumflex is a medium caliber, non-dominant vessel. There is a mid trifurcation where OM1 and OM2 takeoff from a small caliber AV groove branch. There is a motion artifact mid OM1/2 that is uninterpretable, these are moderate caliber vessels with no significant disease  · Right coronary artery: The RCA is a large caliber, dominant vessel. It has a normal takeoff from the right coronary  cusp. The RCA terminates as a PDA and right posterolateral branch. There are focal <25% calcified stenoses in the mid RCA  · Left Ventricle: The ventricular cavity size is within normal limits. There are no stigmata of prior infarction. There is no abnormal filling defect. LVEF is estimated at 55%.  · Successful implantation of a 2.5 x18 mm resolute [place] drug-eluting stent to proximal LAD stenoses    Cardiac CTA/16/2020:  · Total calcium score 125.   · Normal coronary origins and courses.    · Severe proximal LAD disease with sequential lesions, maximum >75%        Assessment:          Diagnosis Plan   1. Coronary artery disease of native artery of native heart with stable angina pectoris (HCC)  ECG 12 Lead      2. Paroxysmal atrial fibrillation (HCC)        3. Essential hypertension        4. Mixed hyperlipidemia               Plan:       Ms. Paiz is a 67 y.o. woman past medical history notable for coronary artery disease status post percutaneous intervention, hypertension, mixed hyperlipidemia, and chronic back pain related orthopedic issues status post surgeries who presents to our office for scheduled follow-up.  Overall patient doing reasonably well would like to stop her clopidogrel and transition her over to aspirin 81 mg to see if this will help out with her bruising.  If not may need to consider other options such as left atrial appendage occlusion if bruising and bleeding does not improve as it is fairly significant even on reduced dose Xarelto.      Coronary artery disease with stable angina:  · Continue with aspirin 81 mg and will stop clopidogrel  · On reduced dose Xarelto given concomitant antiplatelet therapy and bruising  · Unable to tolerate beta-blockers due to fatigue.  · Patient states her myalgias are better off of statin therapy  · Had a edema and low BP with amlodipine  · Patient had headaches with long-acting isosorbide mononitrate    · Continue Ranexa as has helped out significantly with  chest pain    Hypertension:  · Continue with current medical regimen blood pressure seems to be reasonably controlled over the last year does have some spikes at home but given multiple medication intolerances and symptoms at moderately controlled blood pressure would hold off on more aggressive titrate  · Could add spironolactone in the future if need be for better blood pressure control    Mixed Hyperlipidemia:  · Patient reports intolerance to statin therapy  · Lipid panel 2/2021 demonstrates good LDL and total cholesterol on statin therapy but worsened after stopping on repeat check 5/2021  · CMP 5/2021 demonstrates normal ALT and AST    Paroxysmal atrial fibrillation:  · Continue anticoagulation with Xarelto at reduced dose  · Patient stop beta-blocker but does not seem to be bothering her that much  · We will stop clopidogrel and start aspirin 81  · Still significant issues with nuisance bleeding and bruising may need to consider left atrial appendage closure  · [id number] 3      Follow Up:  3 months       Thank you for allowing me to participate in the care of Domitila MCGUIRE Trehector. Feel free to contact me directly with any further questions or concerns.    Carlos A Vasques MD  [facility]  02/20/23  13:25 EST

## 2023-03-06 ENCOUNTER — TELEPHONE (OUTPATIENT)
Dept: CARDIOLOGY | Facility: CLINIC | Age: 68
End: 2023-03-06
Payer: MEDICARE

## 2023-03-06 NOTE — TELEPHONE ENCOUNTER
Patient called needing a cardiac clearance for foot surgery. Dr.Melissa Cullen will be the surgeon. She does takes a low dose aspirin and Xarleto 15 mg.      Their  fax number [phone].

## 2023-03-06 NOTE — TELEPHONE ENCOUNTER
She is fine for surgery.  May hold her blood thinner as needed for procedure as it is for paroxysmal atrial fibrillation.

## 2023-03-28 ENCOUNTER — OFFICE VISIT (OUTPATIENT)
Dept: OBSTETRICS AND GYNECOLOGY | Facility: CLINIC | Age: 68
End: 2023-03-28
Payer: MEDICARE

## 2023-03-28 ENCOUNTER — PROCEDURE VISIT (OUTPATIENT)
Dept: OBSTETRICS AND GYNECOLOGY | Facility: CLINIC | Age: 68
End: 2023-03-28
Payer: MEDICARE

## 2023-03-28 ENCOUNTER — TELEPHONE (OUTPATIENT)
Dept: CARDIOLOGY | Facility: CLINIC | Age: 68
End: 2023-03-28
Payer: MEDICARE

## 2023-03-28 VITALS
BODY MASS INDEX: 24.19 KG/M2 | DIASTOLIC BLOOD PRESSURE: 73 MMHG | SYSTOLIC BLOOD PRESSURE: 147 MMHG | WEIGHT: 120 LBS | HEIGHT: 59 IN

## 2023-03-28 DIAGNOSIS — K64.4 EXTERNAL HEMORRHOIDS: ICD-10-CM

## 2023-03-28 DIAGNOSIS — Z12.31 VISIT FOR SCREENING MAMMOGRAM: Primary | ICD-10-CM

## 2023-03-28 DIAGNOSIS — Z12.31 BREAST CANCER SCREENING BY MAMMOGRAM: ICD-10-CM

## 2023-03-28 DIAGNOSIS — Z01.419 WELL WOMAN EXAM WITH ROUTINE GYNECOLOGICAL EXAM: Primary | ICD-10-CM

## 2023-03-28 DIAGNOSIS — R68.82 DECREASED LIBIDO: ICD-10-CM

## 2023-03-28 DIAGNOSIS — Z78.0 POSTMENOPAUSAL STATUS: ICD-10-CM

## 2023-03-28 DIAGNOSIS — Z12.11 COLON CANCER SCREENING: ICD-10-CM

## 2023-03-28 DIAGNOSIS — N95.2 VAGINAL ATROPHY: ICD-10-CM

## 2023-03-28 PROCEDURE — 3078F DIAST BP <80 MM HG: CPT | Performed by: STUDENT IN AN ORGANIZED HEALTH CARE EDUCATION/TRAINING PROGRAM

## 2023-03-28 PROCEDURE — G0101 CA SCREEN;PELVIC/BREAST EXAM: HCPCS | Performed by: STUDENT IN AN ORGANIZED HEALTH CARE EDUCATION/TRAINING PROGRAM

## 2023-03-28 PROCEDURE — 1159F MED LIST DOCD IN RCRD: CPT | Performed by: STUDENT IN AN ORGANIZED HEALTH CARE EDUCATION/TRAINING PROGRAM

## 2023-03-28 PROCEDURE — 1160F RVW MEDS BY RX/DR IN RCRD: CPT | Performed by: STUDENT IN AN ORGANIZED HEALTH CARE EDUCATION/TRAINING PROGRAM

## 2023-03-28 PROCEDURE — 99213 OFFICE O/P EST LOW 20 MIN: CPT | Performed by: STUDENT IN AN ORGANIZED HEALTH CARE EDUCATION/TRAINING PROGRAM

## 2023-03-28 PROCEDURE — 3077F SYST BP >= 140 MM HG: CPT | Performed by: STUDENT IN AN ORGANIZED HEALTH CARE EDUCATION/TRAINING PROGRAM

## 2023-03-28 NOTE — PROGRESS NOTES
GYN Annual Exam     CC- Here for annual exam.     Domitila Paiz is a 67 y.o. postmenopausal female who presents for annual well woman exam. She denies vaginal bleeding or vasomotor symptoms. She has history of hysterectomy over 20 year ago for uterine fibroids and abnormal uterine bleeding. She also reports having a bladder tact too. She is concerned today regarding ongoing decreased sex drive and vaginal dryness. This is her biggest concern and it is affecting her relationship with her .   She is also concerned regarding a large hemorrhoid that bleeds intermittently and has recently bleed for 8-10 hours.     OB History        2    Para   2    Term   2            AB        Living           SAB        IAB        Ectopic        Molar        Multiple        Live Births                  Menopause at 40 years old.  She has previously used HRT.   Current contraception: post menopausal status; s/p hysterectomy   History of abnormal Pap smear: no  Family history of uterine, colon or ovarian cancer: no  History of abnormal mammogram: no  Family history of breast cancer: no  Last Pap : unsure  Last mammogram: 3/23/21- BIRADS-1   Last colonoscopy: - normal; due last year.   Last DEXA: - Osteopenia with Frax score 19% for major osteoporotic fx, 2.3% for hip fx   Parental Hip Fracture: denies     Past Medical History:   Diagnosis Date   • Asthma    • Coronary artery disease    • Heart attack (HCC)    • Hyperlipidemia    • Hypertension        Past Surgical History:   Procedure Laterality Date   • CARDIAC CATHETERIZATION     • CARDIAC CATHETERIZATION N/A 2022    Procedure: Left Heart Cath;  Surgeon: Carlos A Vasques MD;  Location:  [facility];  Service: Cardiovascular;  Laterality: N/A;   • CARDIAC CATHETERIZATION N/A 2022    Procedure: Coronary angiography;  Surgeon: Carlos A Vasques MD;  Location:  [facility];  Service: Cardiovascular;  Laterality:  N/A;   • COLONOSCOPY  2012    Dr. Lam   • CORONARY STENT PLACEMENT      X3   • HAND RECONSTRUCTION Right     S/P FALL   • HYSTERECTOMY           Current Outpatient Medications:   •  aspirin (ASPIR) 81 MG EC tablet, Take 1 tablet by mouth Daily., Disp: 90 tablet, Rfl: 3  •  chlorthalidone (HYGROTON) 25 MG tablet, Take 1 tablet by mouth Daily., Disp: 90 tablet, Rfl: 3  •  EPINEPHrine (ADRENALIN) 0.1 % nasal solution, 0.5 mL into the nostril(s) as directed by provider As Needed., Disp: , Rfl:   •  hydrALAZINE (APRESOLINE) 50 MG tablet, Take 1 tablet by mouth 2 (Two) Times a Day., Disp: 180 tablet, Rfl: 3  •  multivitamin with minerals tablet tablet, Take 1 tablet by mouth Daily., Disp: , Rfl:   •  nitroglycerin (NITROSTAT) 0.4 MG SL tablet, Place 1 tablet under the tongue Every 5 (Five) Minutes As Needed., Disp: , Rfl:   •  omeprazole (priLOSEC) 40 MG capsule, Take 1 capsule by mouth Daily., Disp: , Rfl:   •  ranolazine (RANEXA) 500 MG 12 hr tablet, Take 1 tablet by mouth twice daily, Disp: 60 tablet, Rfl: 3  •  rivaroxaban (XARELTO) 15 MG tablet, Take 1 tablet by mouth Daily., Disp: 28 tablet, Rfl: 0  •  rizatriptan (MAXALT) 10 MG tablet, Take 1 tablet by mouth 1 (One) Time As Needed for Migraine. May repeat in 2 hours if needed, Disp: , Rfl:     Allergies   Allergen Reactions   • Wasp Venom Anaphylaxis   • Amlodipine Swelling   • Lisinopril Cough   • Morphine Other (See Comments) and Hives     Pt states she almost od'd in the hosp on this years ago   • Poison Ivy Extract Hives   • Pravastatin Other (See Comments)     Unable to sleep   • Valsartan Other (See Comments)   • Betadine [Povidone Iodine] Rash     REDNESS   • Butorphanol Itching, Rash and Hives   • Other Rash     Burn cream       Social History     Tobacco Use   • Smoking status: Never   • Smokeless tobacco: Never   Substance Use Topics   • Alcohol use: No     Comment: caffeine use    • Drug use: No       Family History   Problem Relation Age of Onset   •  "Hypertension Mother        Review of Systems   Genitourinary: Positive for decreased libido, dyspareunia and vaginal pain. Negative for vaginal bleeding and vaginal discharge.       /73   Ht 149.9 cm (59.02\")   Wt 54.4 kg (120 lb)   BMI 24.22 kg/m²     Physical Exam  Vitals reviewed. Exam conducted with a chaperone present.   Constitutional:       General: She is not in acute distress.  HENT:      Head: Normocephalic and atraumatic.      Right Ear: External ear normal.      Left Ear: External ear normal.   Eyes:      Extraocular Movements: Extraocular movements intact.      Pupils: Pupils are equal, round, and reactive to light.   Cardiovascular:      Rate and Rhythm: Normal rate.   Pulmonary:      Effort: Pulmonary effort is normal. No respiratory distress.   Chest:   Breasts:     Right: No swelling, bleeding, inverted nipple, mass, nipple discharge, skin change or tenderness.      Left: No swelling, bleeding, inverted nipple, mass, nipple discharge, skin change or tenderness.   Abdominal:      General: There is no distension.      Palpations: Abdomen is soft.      Tenderness: There is no abdominal tenderness. There is no guarding or rebound.   Genitourinary:     General: Normal vulva.      Exam position: Lithotomy position.      Labia:         Right: No rash, tenderness, lesion or injury.         Left: No rash, tenderness, lesion or injury.       Urethra: No prolapse or urethral swelling.      Vagina: No vaginal discharge, erythema, tenderness, bleeding or lesions.      Uterus: Absent.       Adnexa:         Right: No mass, tenderness or fullness.          Left: No mass, tenderness or fullness.        Rectum: External hemorrhoid present.      Comments: Cervix and uterus surgically absent.   Moderate vulvovaginal atrophy.   Musculoskeletal:         General: No deformity. Normal range of motion.      Cervical back: Normal range of motion and neck supple.   Lymphadenopathy:      Upper Body:      Right upper " body: No supraclavicular or axillary adenopathy.      Left upper body: No supraclavicular or axillary adenopathy.      Lower Body: No right inguinal adenopathy. No left inguinal adenopathy.   Skin:     General: Skin is warm and dry.   Neurological:      General: No focal deficit present.      Mental Status: She is alert and oriented to person, place, and time.   Psychiatric:         Mood and Affect: Mood normal.         Behavior: Behavior normal.            Assessment     1) GYN annual well woman exam.   2) Postmenopausal status  3) Breast cancer screening   4) External hemorrhoid  5) Vaginal atrophy  6) Decreased sex drive       Plan     1) Breast Health - Clinical breast exam & mammogram yearly, Self breast awareness monthly. Mammogram performed today for breast cancer screening.   2) Pap - Not indicated given history of hysterectomy and no history of MADELYN 2 or greater.   3) Smoking status- non-smoker  4) Colon health - screening colonoscopy recommended if not up to date  5) Bone health - Weight bearing exercise, dietary calcium recommendations and vitamin D reviewed.   6) Activity recommends - Adult 150-300 min/week of multi-component physical activities that include balance training, aerobic and physical strengthening.    7) External hemorrhoid- referral to colorectal surgery placed for evaluation and treatment of external hemorrhoid.   8) Decreased sex drive/ vaginal atrophy-  Will prescribe testosterone 1% gel with 1 pump to be applied to the clitoris daily. Discussed that this is not always covered by insurance and may require the patient to pay out of pocket costs. Will look for alternatives if not covered.   9) Follow up prn and two years for breast/pelvic exam.       Karen Tompkins MD

## 2023-03-28 NOTE — TELEPHONE ENCOUNTER
Caller: Domitila Paiz    Relationship: Self    Best call back number: [phone]    PATIENT WOULD LIKE TO COME  SAMPLES OF XARELTO 15MG

## 2023-03-31 ENCOUNTER — TELEPHONE (OUTPATIENT)
Dept: OBSTETRICS AND GYNECOLOGY | Facility: CLINIC | Age: 68
End: 2023-03-31

## 2023-03-31 RX ORDER — TESTOSTERONE 12.5 MG/1.25G
1 GEL TOPICAL DAILY
Qty: 75 G | Refills: 2 | Status: SHIPPED | OUTPATIENT
Start: 2023-03-31

## 2023-04-04 ENCOUNTER — TELEPHONE (OUTPATIENT)
Dept: OBSTETRICS AND GYNECOLOGY | Facility: CLINIC | Age: 68
End: 2023-04-04
Payer: MEDICARE

## 2023-05-04 ENCOUNTER — TELEPHONE (OUTPATIENT)
Dept: CARDIOLOGY | Facility: CLINIC | Age: 68
End: 2023-05-04
Payer: MEDICARE

## 2023-05-04 NOTE — TELEPHONE ENCOUNTER
Pt called requesting samples of Xarelto 15mg.  Can you advise if we have them at [facility] and let pt know?  Thanks    [facility]

## 2023-05-09 PROBLEM — J45.909 ASTHMA: Status: ACTIVE | Noted: 2023-05-09

## 2023-05-09 PROBLEM — G43.909 MIGRAINE: Status: ACTIVE | Noted: 2023-05-09

## 2023-05-09 PROBLEM — Z95.5 PRESENCE OF CORONARY ANGIOPLASTY IMPLANT AND GRAFT: Status: ACTIVE | Noted: 2020-11-03

## 2023-05-09 PROBLEM — M53.3 CHRONIC LEFT SI JOINT PAIN: Status: ACTIVE | Noted: 2020-10-23

## 2023-05-09 PROBLEM — G89.4 CHRONIC PAIN DISORDER: Status: ACTIVE | Noted: 2017-01-18

## 2023-05-09 PROBLEM — R07.9 ACUTE CHEST PAIN: Status: ACTIVE | Noted: 2020-11-03

## 2023-05-09 PROBLEM — E78.5 DYSLIPIDEMIA: Status: ACTIVE | Noted: 2018-07-11

## 2023-05-09 PROBLEM — H35.3220 EXUDATIVE AGE-RELATED MACULAR DEGENERATION OF LEFT EYE: Status: ACTIVE | Noted: 2021-01-15

## 2023-05-09 PROBLEM — I24.9 ACS (ACUTE CORONARY SYNDROME): Status: ACTIVE | Noted: 2020-11-03

## 2023-05-09 PROBLEM — E78.2 MIXED HYPERLIPIDEMIA: Status: ACTIVE | Noted: 2020-11-03

## 2023-05-09 PROBLEM — I20.0 CRESCENDO ANGINA: Status: ACTIVE | Noted: 2020-10-30

## 2023-05-09 PROBLEM — I25.10 CORONARY ATHEROSCLEROSIS: Status: ACTIVE | Noted: 2020-10-19

## 2023-05-09 PROBLEM — M79.661 PAIN IN BOTH LOWER LEGS: Status: ACTIVE | Noted: 2019-10-21

## 2023-05-09 PROBLEM — G89.29 CHRONIC LEFT SI JOINT PAIN: Status: ACTIVE | Noted: 2020-10-23

## 2023-05-09 PROBLEM — H47.399 HEMORRHAGE OF OPTIC DISC: Status: ACTIVE | Noted: 2020-10-23

## 2023-05-09 PROBLEM — D64.9 CHRONIC ANEMIA: Status: ACTIVE | Noted: 2018-07-11

## 2023-05-09 PROBLEM — R93.1 ABNORMAL CT SCAN, HEART: Status: ACTIVE | Noted: 2020-10-26

## 2023-05-09 PROBLEM — M79.662 PAIN IN BOTH LOWER LEGS: Status: ACTIVE | Noted: 2019-10-21

## 2023-05-09 PROBLEM — M62.830 SPASM OF BACK MUSCLES: Status: ACTIVE | Noted: 2017-01-18

## 2023-05-09 PROBLEM — I21.4 NSTEMI (NON-ST ELEVATED MYOCARDIAL INFARCTION): Status: ACTIVE | Noted: 2020-11-03

## 2023-05-09 PROBLEM — M54.42 CHRONIC LEFT-SIDED LOW BACK PAIN WITH LEFT-SIDED SCIATICA: Status: ACTIVE | Noted: 2017-01-18

## 2023-05-09 PROBLEM — G89.29 CHRONIC LEFT-SIDED LOW BACK PAIN WITH LEFT-SIDED SCIATICA: Status: ACTIVE | Noted: 2017-01-18

## 2023-05-09 PROBLEM — M94.0 COSTOCHONDRITIS: Status: ACTIVE | Noted: 2019-10-21

## 2023-05-09 PROBLEM — Z95.5 PRESENCE OF STENT IN CORONARY ARTERY: Status: ACTIVE | Noted: 2020-11-03

## 2023-05-09 RX ORDER — FAMOTIDINE 20 MG/1
TABLET, FILM COATED ORAL
COMMUNITY
Start: 2023-03-24

## 2023-05-09 RX ORDER — PRAVASTATIN SODIUM 10 MG
TABLET ORAL
COMMUNITY
Start: 2023-03-24

## 2023-05-15 ENCOUNTER — OFFICE VISIT (OUTPATIENT)
Dept: SURGERY | Facility: CLINIC | Age: 68
End: 2023-05-15
Payer: MEDICARE

## 2023-05-15 VITALS
BODY MASS INDEX: 24.92 KG/M2 | HEART RATE: 93 BPM | HEIGHT: 59 IN | SYSTOLIC BLOOD PRESSURE: 140 MMHG | WEIGHT: 123.6 LBS | OXYGEN SATURATION: 96 % | DIASTOLIC BLOOD PRESSURE: 80 MMHG

## 2023-05-15 DIAGNOSIS — R15.1 FECAL SMEARING: ICD-10-CM

## 2023-05-15 DIAGNOSIS — R19.4 CHANGE IN BOWEL HABITS: Primary | ICD-10-CM

## 2023-05-15 DIAGNOSIS — K64.8 INTERNAL HEMORRHOIDS: ICD-10-CM

## 2023-05-15 DIAGNOSIS — K62.5 RECTAL BLEEDING: ICD-10-CM

## 2023-05-15 RX ORDER — HYDROCORTISONE 25 MG/G
CREAM TOPICAL
Qty: 30 G | Refills: 1 | Status: SHIPPED | OUTPATIENT
Start: 2023-05-15

## 2023-05-15 RX ORDER — SODIUM CHLORIDE, SODIUM LACTATE, POTASSIUM CHLORIDE, CALCIUM CHLORIDE 600; 310; 30; 20 MG/100ML; MG/100ML; MG/100ML; MG/100ML
30 INJECTION, SOLUTION INTRAVENOUS CONTINUOUS
OUTPATIENT
Start: 2023-09-13
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
## 2023-05-31 ENCOUNTER — TELEPHONE (OUTPATIENT)
Dept: CARDIOLOGY | Facility: CLINIC | Age: 68
End: 2023-05-31

## 2023-05-31 RX ORDER — CHLORTHALIDONE 25 MG/1
TABLET ORAL
Qty: 90 TABLET | Refills: 3 | Status: SHIPPED | OUTPATIENT
Start: 2023-05-31

## 2023-05-31 RX ORDER — RANOLAZINE 500 MG/1
TABLET, EXTENDED RELEASE ORAL
Qty: 180 TABLET | Refills: 3 | Status: SHIPPED | OUTPATIENT
Start: 2023-05-31

## 2023-05-31 NOTE — TELEPHONE ENCOUNTER
Caller: ZECHARIAH    Relationship: SELF    Best call back number: [phone]    What is the best time to reach you: ANY        What was the call regarding: REQUESTING -XARELTO SAMPLES 15 MG IF POSSIBLE.  SHE MAY BE IN [place] FOR ABOUT TWO MONTHS AND WOULD NEED ENOUGH TO COVER THAT.    PT NEEDS TO TRAVEL TO [place] FOR HER HUSBANDS APPOINTMENT.  SHE WILL BE FLYING AND WOULD LIKE TO KNOW IF THERE ARE ANY RESTRICTIONS OR IF SHE NEEDS ANYTHING SPECIAL FOR THE TRIP.  SHE LEAVES ON 6/2/23    Is it okay if the provider responds through [facility]: NO, PLEASE CALL BACK

## 2023-05-31 NOTE — TELEPHONE ENCOUNTER
Called and spoke with pt. She is aware to  samples.    Monika- She will be traveling soon, and she would like to know if there are any restrictions to her traveling.

## 2023-08-10 ENCOUNTER — HOSPITAL ENCOUNTER (OUTPATIENT)
Facility: HOSPITAL | Age: 68
Discharge: HOME | End: 2023-08-10
Attending: EMERGENCY MEDICINE
Payer: MEDICARE

## 2023-08-10 VITALS
WEIGHT: 123 LBS | HEIGHT: 58 IN | BODY MASS INDEX: 25.82 KG/M2 | DIASTOLIC BLOOD PRESSURE: 77 MMHG | SYSTOLIC BLOOD PRESSURE: 157 MMHG | RESPIRATION RATE: 16 BRPM | TEMPERATURE: 97.8 F | HEART RATE: 81 BPM | OXYGEN SATURATION: 97 %

## 2023-08-10 DIAGNOSIS — J01.10 ACUTE FRONTAL SINUSITIS, RECURRENCE NOT SPECIFIED: Primary | ICD-10-CM

## 2023-08-10 LAB
FLUAV SUBTYP SPEC NAA+PROBE: NOT DETECTED
FLUBV RNA ISLT QL NAA+PROBE: NOT DETECTED
SARS-COV-2 RNA RESP QL NAA+PROBE: NOT DETECTED
STREP A PCR: NOT DETECTED

## 2023-08-10 PROCEDURE — 87636 SARSCOV2 & INF A&B AMP PRB: CPT | Performed by: NURSE PRACTITIONER

## 2023-08-10 PROCEDURE — G0463 HOSPITAL OUTPT CLINIC VISIT: HCPCS | Performed by: NURSE PRACTITIONER

## 2023-08-10 PROCEDURE — 87651 STREP A DNA AMP PROBE: CPT | Performed by: NURSE PRACTITIONER

## 2023-08-10 RX ORDER — AMOXICILLIN 500 MG/1
500 CAPSULE ORAL 2 TIMES DAILY
Qty: 20 CAPSULE | Refills: 0 | Status: SHIPPED | OUTPATIENT
Start: 2023-08-10

## 2023-08-10 NOTE — FSED PROVIDER NOTE
Subjective   History of Present Illness  Patient is a 67-year-old female who presents complaining of nasal congestion, frontal sinus tenderness, bilateral ear pain, sore throat, cough x 1 to 2 days.  States she recently traveled back from [place] and was on a plane.  She denies any chest pain, shortness of breath.  Denies any vomiting or diarrhea.    Review of Systems   HENT:  Positive for congestion, ear pain, sinus pain and sore throat.    All other systems reviewed and are negative.    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    Burn of chest wall, first degree 05/2021    Carpal tunnel syndrome     Chronic anemia 07/11/2018    Chronic left SI joint pain 10/23/2020    Chronic left-sided low back pain with left-sided sciatica 01/18/2017    Chronic pain disorder 01/18/2017    Clavicle enlargement 03/2017    Contusion of right chest wall 09/17/2022    D/T FALL, SEEN AT [facility] ER    Coronary artery disease     Coronary atherosclerosis 10/19/2020    Costochondritis 10/21/2019    COVID-19 02/02/2023    COVID-19 01/2022    Crescendo angina 11/02/2020    SEEN AT [facility] ER    Decreased libido 03/2023    Diabetes mellitus     TYPE 2, NIDDM, DIET CONTROLLED, NO MEDS    Dysphagia     Edema of right ankle 12/2022    Exudative age-related macular degeneration of left eye 01/15/2021    Gout of left wrist 10/2022    Head injury 10/31/2022    WITH NECK MUSCLE STRAIN, LUMBAR STRAIN, SEEN AT [facility] ER    Hemorrhage of optic disc 10/23/2020    Hemorrhoids     History of blood transfusion     History of postmenopausal HRT     Hyperlipidemia     MIXED HLD    Hypertension     Hypokalemia 03/2017    Influenza A 01/31/2018    Influenza B 06/20/2022    Lumbar radiculopathy 12/15/2016    Lumbar spondylolysis     Migraine 05/09/2023    Managed by  GIOVANNY Arnold    Multiple gastric ulcers     Near syncope 02/05/2021    Neurogenic claudication 11/04/2016    Managed by GIOVANNY Arnold    NSTEMI (non-ST elevated myocardial infarction) 11/03/2020    SEEN AT [facility] ER    Paroxysmal atrial fibrillation 05/06/2021    Piriformis syndrome of left side 08/2022    PNA (pneumonia) 03/21/2014    SEEN AT [facility] ER    Post concussive syndrome 01/21/2016    SEEN AT [facility] ER    Pseudoarthrosis of lumbar spine 11/04/2016    Managed by GIOVANNY Arnold    Retrolisthesis 04/2022    Spasm of back muscles 01/18/2017    Vaginal atrophy        Allergies   Allergen Reactions    Morphine Hives and Other (See Comments)     Pt states she almost od'd in the hosp on this years ago    Wasp Venom Anaphylaxis    Amlodipine Swelling    Lisinopril Cough    Pravastatin Irritability     Unable to sleep    Betadine [Povidone Iodine] Rash     REDNESS    Butorphanol Itching, Rash and Hives    Other Rash     Burn cream    Poison Ivy Extract Hives    Valsartan Unknown - Low Severity       Past Surgical History:   Procedure Laterality Date    APPENDECTOMY N/A     CARDIAC CATHETERIZATION N/A 01/04/2022    Procedure: Left Heart Cath;  Surgeon: Kaye Vasques MD;  Location:  [facility];  Service: Cardiovascular;  Laterality: N/A;    CARDIAC CATHETERIZATION N/A 01/04/2022    Procedure: Coronary angiography;  Surgeon: Kaye Vasques MD;  Location:  [facility];  Service: Cardiovascular;  Laterality: N/A;    CARDIAC CATHETERIZATION Left 10/31/2020    STENT X 2, LAD, DR. SIMRAN JHA AT [place]    CARDIAC CATHETERIZATION Left 11/03/2020    DR. KAYE WOODWARD AT [place]    CARPAL TUNNEL RELEASE Right 10/24/2019    COLONOSCOPY N/A 2012    DR.DOUGLAS ROSA    ENDOSCOPY N/A 02/24/2020    MILD DUODENAL INTRAEPITHELIAL LYMPHOCYTOSIS, TORTUOUS ESOPHAGUS, Z LINE IRREGULAR AT 38 CM, SMALL AMOUNT OF FOOD IN STOMACH, GASTRITIS, PATH: (+) BARRETTS AND REACTIVE GASTROPATHY,  DR. ERICK DAILY AT [facility]    FOOT SURGERY Right 03/2014    2ND TOE    HAND SURGERY Right 10/24/2019    CYST REMOVED, TENDON REALIGNMENT ON THUMB    HAND SURGERY Left 08/05/2010    HAND JOINT REPLACEMENT, DR. BEATRIZ ALAS AT [place]    HYSTERECTOMY N/A 11/12/2001    DR. YOLA VENEGAS AT [place]    LUMBAR FUSION N/A 12/20/2007    DR. ZACHARY ROUSE AT [place]    LUMBAR SPINE HARDWARE REMOVAL N/A 12/17/2008    WITH FUSION, DR. ZACHARY ROUSE AT [place]    TONSILLECTOMY Bilateral     TUBAL ABDOMINAL LIGATION Bilateral        Family History   Problem Relation Age of Onset    Diabetes Mother     Hypertension Mother     Heart disease Father        Social History     Socioeconomic History    Marital status:    Tobacco Use    Smoking status: Never     Passive exposure: Never    Smokeless tobacco: Never   Vaping Use    Vaping Use: Never used   Substance and Sexual Activity    Alcohol use: No     Comment: caffeine use     Drug use: No    Sexual activity: Yes     Partners: Male     Birth control/protection: Surgical, Hysterectomy, Post-menopausal, Tubal ligation           Objective   Physical Exam  Vitals and nursing note reviewed.   Constitutional:       Appearance: Normal appearance.   HENT:      Head: Normocephalic and atraumatic.      Right Ear: A middle ear effusion is present.      Left Ear: A middle ear effusion is present.      Nose: Congestion present.      Mouth/Throat:      Mouth: Mucous membranes are moist.      Pharynx: Posterior oropharyngeal erythema present.   Cardiovascular:      Rate and Rhythm: Normal rate and regular rhythm.      Pulses: Normal pulses.   Pulmonary:      Effort: Pulmonary effort is normal.      Breath sounds: Normal breath sounds.   Abdominal:      General: Abdomen is flat.      Palpations: Abdomen is soft.      Tenderness: There is no abdominal tenderness.   Musculoskeletal:      Cervical back: Normal range of motion and neck supple.   Skin:     General: Skin is  warm and dry.      Capillary Refill: Capillary refill takes less than 2 seconds.   Neurological:      General: No focal deficit present.      Mental Status: She is alert and oriented to person, place, and time.       Procedures           ED Course                                           Medical Decision Making  Negative strep, COVID, flu.  Patient is nontoxic-appearing.  Will cover with antibiotics due to frontal facial tenderness.  Given strict return precautions.    Problems Addressed:  Acute frontal sinusitis, recurrence not specified: complicated acute illness or injury    Risk  Prescription drug management.        Final diagnoses:   Acute frontal sinusitis, recurrence not specified       ED Disposition  ED Disposition       ED Disposition   Discharge    Condition   Stable    Comment   --               Melissa Billy, APRN  [id number] [id number] [phone]    Call   If symptoms worsen         Medication List        New Prescriptions      amoxicillin 500 MG capsule  Commonly known as: AMOXIL  Take 1 capsule by mouth 2 (Two) Times a Day.               Where to Get Your Medications        These medications were sent to [address] - [facility] - [phone]  - [phone] [address] [address]      Phone: [phone]   amoxicillin 500 MG capsule

## 2023-09-05 ENCOUNTER — OFFICE VISIT (OUTPATIENT)
Dept: CARDIOLOGY | Facility: CLINIC | Age: 68
End: 2023-09-05
Payer: MEDICARE

## 2023-09-05 VITALS
HEART RATE: 77 BPM | SYSTOLIC BLOOD PRESSURE: 140 MMHG | HEIGHT: 58 IN | WEIGHT: 126 LBS | BODY MASS INDEX: 26.45 KG/M2 | OXYGEN SATURATION: 97 % | DIASTOLIC BLOOD PRESSURE: 82 MMHG

## 2023-09-05 DIAGNOSIS — E78.2 MIXED HYPERLIPIDEMIA: ICD-10-CM

## 2023-09-05 DIAGNOSIS — I25.118 CORONARY ARTERY DISEASE OF NATIVE ARTERY OF NATIVE HEART WITH STABLE ANGINA PECTORIS: Primary | ICD-10-CM

## 2023-09-05 DIAGNOSIS — I10 HYPERTENSION, UNSPECIFIED TYPE: ICD-10-CM

## 2023-09-05 NOTE — PROGRESS NOTES
[facility] Follow Up Office Note     Encounter Date:23  Patient:Domitila Paiz  :1955  MRN:[id number]      Chief Complaint:   Chief Complaint   Patient presents with    Coronary Artery Disease         History of Presenting Illness:        Domitila Paiz is a 67 y.o. female who is here for follow-up.  She is a patient of Dr Vasques.    Patient has past medical history significant for coronary artery disease with prior PCI, essential hypertension, mixed hyperlipidemia and chronic back pain related to orthopedic issues.    Patient is status post JAZMIN of LAD in October and 2020.  In  she had repeat left heart catheterization with patent stents in proximal and mid LAD and no significant in-stent restenosis.    Patient was last seen in the office by Dr. Vasques in February of this year.  From a cardiac perspective she was doing well at this time.  She did have noted occasional spikes in blood pressure, but was noted to feel symptomatic with SBP in the 120s.    Today patient reports things have been stable. She is not having chest pain, dyspnea on exertion, FELICIA, PND or orthopnea. Several months ago she was having low blood pressure and her chlorthalidone was stopped by Dr. Vasques.  She feels improved since then.  Reviewing her blood pressure log she typically has  to 140 mmHg.    Review of Systems:  Review of Systems   Cardiovascular:  Negative for chest pain, dyspnea on exertion, leg swelling, orthopnea and palpitations.   Respiratory:  Negative for shortness of breath.      Current Outpatient Medications on File Prior to Visit   Medication Sig Dispense Refill    aspirin (ASPIR) 81 MG EC tablet Take 1 tablet by mouth Daily. 90 tablet 3    EPINEPHrine (ADRENALIN) 0.1 % nasal solution 0.5 mL into the nostril(s) as directed by provider As Needed.      famotidine (PEPCID) 20 MG tablet TAKE 1 TABLET BY MOUTH NIGHTLY AS NEEDED FOR HEARTBURN      hydrALAZINE (APRESOLINE) 50 MG  tablet Take 1 tablet by mouth 2 (Two) Times a Day. 180 tablet 3    Hydrocortisone, Perianal, (Anusol-HC) 2.5 % rectal cream Apply rectally 3 times daily.  Include applicator. 30 g 1    multivitamin with minerals tablet tablet Take 1 tablet by mouth Daily.      nitroglycerin (NITROSTAT) 0.4 MG SL tablet Place 1 tablet under the tongue Every 5 (Five) Minutes As Needed.      pravastatin (PRAVACHOL) 10 MG tablet TAKE 1/2 (ONE-HALF) TABLET BY MOUTH DAILY ON MONDAY, WEDNESDAY AND FRIDAY      ranolazine (RANEXA) 500 MG 12 hr tablet Take 1 tablet by mouth twice daily 180 tablet 3    rivaroxaban (XARELTO) 15 MG tablet Take 1 tablet by mouth Daily. 28 tablet 0    rizatriptan (MAXALT) 10 MG tablet Take 1 tablet by mouth 1 (One) Time As Needed for Migraine. May repeat in 2 hours if needed      testosterone 12.5 MG/ACT (1%) gel Place 1 Pump on the skin as directed by provider Daily. 75 g 2     No current facility-administered medications on file prior to visit.       Allergies   Allergen Reactions    Morphine Hives and Other (See Comments)     Pt states she almost od'd in the hosp on this years ago    Wasp Venom Anaphylaxis    Amlodipine Swelling    Lisinopril Cough    Pravastatin Irritability     Unable to sleep    Betadine [Povidone Iodine] Rash     REDNESS    Butorphanol Itching, Rash and Hives    Other Rash     Burn cream    Poison Ivy Extract Hives    Valsartan Unknown - Low Severity       Past Medical History:   Diagnosis Date    Abnormal CT of the chest 11/17/2022    DONE AT [place], SHOWED CALCIFIED GRANULOMA    Abnormal CT scan, heart 10/26/2020    Asthma     Atypical chest pain     SEEN AT [facility] ER    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    Burn of chest wall, first degree 05/2021    Carpal tunnel syndrome     Chronic anemia 07/11/2018    Chronic left SI joint pain 10/23/2020    Chronic left-sided low back pain with left-sided sciatica 01/18/2017    Chronic pain disorder 01/18/2017    Clavicle enlargement 03/2017    Contusion of  right chest wall 09/17/2022    D/T FALL, SEEN AT [facility] ER    Coronary artery disease     Coronary atherosclerosis 10/19/2020    Costochondritis 10/21/2019    COVID-19 02/02/2023    COVID-19 01/2022    Crescendo angina 11/02/2020    SEEN AT [facility] ER    Decreased libido 03/2023    Diabetes mellitus     TYPE 2, NIDDM, DIET CONTROLLED, NO MEDS    Dysphagia     Edema of right ankle 12/2022    Exudative age-related macular degeneration of left eye 01/15/2021    Gout of left wrist 10/2022    Head injury 10/31/2022    WITH NECK MUSCLE STRAIN, LUMBAR STRAIN, SEEN AT [facility] ER    Hemorrhage of optic disc 10/23/2020    Hemorrhoids     History of blood transfusion     History of postmenopausal HRT     Hyperlipidemia     MIXED HLD    Hypertension     Hypokalemia 03/2017    Influenza A 01/31/2018    Influenza B 06/20/2022    Lumbar radiculopathy 12/15/2016    Lumbar spondylolysis     Migraine 05/09/2023    Managed by GIOVANNY Arnold    Multiple gastric ulcers     Near syncope 02/05/2021    Neurogenic claudication 11/04/2016    Managed by GIOVANNY Arnold    NSTEMI (non-ST elevated myocardial infarction) 11/03/2020    SEEN AT [facility] ER    Paroxysmal atrial fibrillation 05/06/2021    Piriformis syndrome of left side 08/2022    PNA (pneumonia) 03/21/2014    SEEN AT [facility] ER    Post concussive syndrome 01/21/2016    SEEN AT [facility] ER    Pseudoarthrosis of lumbar spine 11/04/2016    Managed by GIOVANNY Arnold    Retrolisthesis 04/2022    Spasm of back muscles 01/18/2017    Vaginal atrophy        Past Surgical History:   Procedure Laterality Date    APPENDECTOMY N/A     CARDIAC CATHETERIZATION N/A 01/04/2022    Procedure: Left Heart Cath;  Surgeon: Carlos A Vasques MD;  Location:  [facility];  Service: Cardiovascular;  Laterality: N/A;    CARDIAC CATHETERIZATION N/A 01/04/2022    Procedure: Coronary angiography;  Surgeon: Carlos A Vasques MD;  Location:  [facility];  Service: Cardiovascular;   "Laterality: N/A;    CARDIAC CATHETERIZATION Left 10/31/2020    STENT X 2, LAD, DR. SIMRAN JHA AT [place]    CARDIAC CATHETERIZATION Left 11/03/2020    DR. KAYE WOODWARD AT [place]    CARPAL TUNNEL RELEASE Right 10/24/2019    COLONOSCOPY N/A 2012    DR.DOUGLAS ROSA    ENDOSCOPY N/A 02/24/2020    MILD DUODENAL INTRAEPITHELIAL LYMPHOCYTOSIS, TORTUOUS ESOPHAGUS, Z LINE IRREGULAR AT 38 CM, SMALL AMOUNT OF FOOD IN STOMACH, GASTRITIS, PATH: (+) BARRETTS AND REACTIVE GASTROPATHY, DR. ERICK DAILY AT [facility]    FOOT SURGERY Right 03/2014    2ND TOE    HAND SURGERY Right 10/24/2019    CYST REMOVED, TENDON REALIGNMENT ON THUMB    HAND SURGERY Left 08/05/2010    HAND JOINT REPLACEMENT, DR. BEATRIZ ALAS AT [place]    HYSTERECTOMY N/A 11/12/2001    DR. YOLA VENEGAS AT [place]    LUMBAR FUSION N/A 12/20/2007    DR. ZACHARY ROUSE AT [place]    LUMBAR SPINE HARDWARE REMOVAL N/A 12/17/2008    WITH FUSION, DR. ZACHARY ROUSE AT [place]    TONSILLECTOMY Bilateral     TUBAL ABDOMINAL LIGATION Bilateral        Social History     Socioeconomic History    Marital status:    Tobacco Use    Smoking status: Never     Passive exposure: Never    Smokeless tobacco: Never   Vaping Use    Vaping Use: Never used   Substance and Sexual Activity    Alcohol use: No     Comment: caffeine use     Drug use: No    Sexual activity: Yes     Partners: Male     Birth control/protection: Surgical, Hysterectomy, Post-menopausal, Tubal ligation       Family History   Problem Relation Age of Onset    Diabetes Mother     Hypertension Mother     Heart disease Father        The following portions of the patient's history were reviewed and updated as appropriate: allergies, current medications, past family history, past medical history, past social history, past surgical history and problem list.       Objective:       Vitals:    09/05/23 1239   BP: 140/82   Pulse: 77   SpO2: 97%   Weight: 57.2 kg (126 lb)   Height: 147.3 cm (58\") "         Physical Exam:  Constitutional: Well appearing, well developed, no acute distress   HENT: Oropharynx clear and membrane moist  Eyes: Normal conjunctiva, no sclera icterus  Neck: Supple, no carotid bruit bilaterally  Cardiovascular: Regular rate and rhythm, No Murmur, No bilateral lower extremity edema  Pulmonary: Normal respiratory effort, normal lung sounds, no wheezing  Neurological: Alert and orient x 3  Skin: Warm, dry, no ecchymosis, no rash  Psych: Appropriate mood and affect. Normal judgment and insight         Lab Results   Component Value Date     05/02/2022     01/03/2022    K 3.5 05/02/2022    K 3.7 01/03/2022     05/02/2022     01/03/2022    CO2 27.0 05/02/2022    CO2 28.4 01/03/2022    BUN 11 05/02/2022    BUN 13 01/03/2022    CREATININE 0.76 05/02/2022    CREATININE 0.71 01/03/2022    EGFRIFNONA 82 01/03/2022    EGFRIFNONA 64 05/03/2021    GLUCOSE 90 05/02/2022    GLUCOSE 158 (H) 01/03/2022    CALCIUM 9.1 05/02/2022    CALCIUM 9.3 01/03/2022    ALBUMIN 4.20 05/03/2021    ALBUMIN 4.0 01/22/2021    BILITOT 0.2 05/03/2021    BILITOT 0.2 01/22/2021    AST 22 05/03/2021    AST 24 01/22/2021    ALT 20 05/03/2021    ALT 22 01/22/2021     Lab Results   Component Value Date    WBC 6.47 01/26/2023    WBC 5.93 11/07/2022    HGB 12.3 01/26/2023    HGB 12.4 11/07/2022    HCT 39.4 01/26/2023    HCT 38.9 11/07/2022    MCV 91.8 01/26/2023    MCV 92.4 11/07/2022     01/26/2023     11/07/2022     Lab Results   Component Value Date    CHOL 111 02/05/2021    TRIG 107 02/05/2021    TRIG 60 03/14/2019    HDL 58 02/05/2021    HDL 69 03/14/2019    LDL 33 02/05/2021     (H) 03/14/2019     Lab Results   Component Value Date    .0 11/02/2020     Lab Results   Component Value Date    TROPONINI 0.092 (H) 11/02/2020    TROPONINT <0.010 05/03/2021     Lab Results   Component Value Date    TSH 0.878 11/18/2020    TSH 0.957 03/14/2019           ECG 12 Lead    Date/Time:  9/5/2023 12:55 PM  Performed by: Monika Hernandez APRN  Authorized by: Monika Hernandez APRN   Comparison: compared with previous ECG from 2/20/2023  Similar to previous ECG  Rhythm: sinus rhythm  BPM: 71  Conduction: conduction normal  ST Segments: ST segments normal           Assessment:          Diagnosis Plan   1. Coronary artery disease of native artery of native heart with stable angina pectoris  ECG 12 Lead    Lipid Panel      2. Mixed hyperlipidemia        3. Hypertension, unspecified type               Plan:       Coronary artery disease - history of PCI.  Continue aspirin, statin.  She has been intolerant of beta-blocker  Essential hypertension -blood pressure is reasonably controlled on current regimen.  Chlorthalidone was stopped earlier this year due to low blood pressure range  Mixed hyperlipidemia -LDL on most recent labs is high.  She has a difficult time tolerating statins due to myalgias and stopped very low-dose pravastatin for this reason.  Previously also trialed on other statins.  She will have repeat lipid panel as she has been working with diet and exercise.  If LDL remains above goal I have recommended starting PSK 9 inhibitor.  Paroxysmal atrial fibrillation -continue low-dose Xarelto --dose previously changed for bleeding concerns --and baby aspirin.  She is currently in sinus    Patient is seen today for follow-up.  From a cardiac perspective I think she is doing well.  I have recommended addressing her cholesterol and consideration of PSK 9 inhibitor if LDL remains above goal.  She will have repeat labs this week.  Otherwise, follow-up in 6 months or earlier with problems      Orders Placed This Encounter   Procedures    Lipid Panel     Standing Status:   Future     Standing Expiration Date:   9/5/2024     Order Specific Question:   Release to patient     Answer:   Routine Release [[id number]]    ECG 12 Lead     This order was created via procedure documentation     Order Specific  Question:   Release to patient     Answer:   Routine Release [[id number]]            GIOVANNY Rae  [facility]  09/06/23  08:40 EDT

## 2023-09-08 ENCOUNTER — TELEPHONE (OUTPATIENT)
Dept: CARDIOLOGY | Facility: CLINIC | Age: 68
End: 2023-09-08
Payer: MEDICARE

## 2023-09-08 ENCOUNTER — PRE-ADMISSION TESTING (OUTPATIENT)
Dept: PREADMISSION TESTING | Facility: HOSPITAL | Age: 68
End: 2023-09-08
Payer: MEDICARE

## 2023-09-08 VITALS
DIASTOLIC BLOOD PRESSURE: 86 MMHG | SYSTOLIC BLOOD PRESSURE: 179 MMHG | HEIGHT: 58 IN | TEMPERATURE: 97.1 F | RESPIRATION RATE: 18 BRPM | OXYGEN SATURATION: 99 % | WEIGHT: 126.4 LBS | HEART RATE: 83 BPM | BODY MASS INDEX: 26.53 KG/M2

## 2023-09-08 LAB
ANION GAP SERPL CALCULATED.3IONS-SCNC: 11 MMOL/L (ref 5–15)
BUN SERPL-MCNC: 14 MG/DL (ref 8–23)
BUN/CREAT SERPL: 17.5 (ref 7–25)
CALCIUM SPEC-SCNC: 9.4 MG/DL (ref 8.6–10.5)
CHLORIDE SERPL-SCNC: 104 MMOL/L (ref 98–107)
CO2 SERPL-SCNC: 25 MMOL/L (ref 22–29)
CREAT SERPL-MCNC: 0.8 MG/DL (ref 0.57–1)
DEPRECATED RDW RBC AUTO: 43.5 FL (ref 37–54)
EGFRCR SERPLBLD CKD-EPI 2021: 80.9 ML/MIN/1.73
ERYTHROCYTE [DISTWIDTH] IN BLOOD BY AUTOMATED COUNT: 13.2 % (ref 12.3–15.4)
GLUCOSE SERPL-MCNC: 102 MG/DL (ref 65–99)
HCT VFR BLD AUTO: 36.4 % (ref 34–46.6)
HGB BLD-MCNC: 11.6 G/DL (ref 12–15.9)
MCH RBC QN AUTO: 28.7 PG (ref 26.6–33)
MCHC RBC AUTO-ENTMCNC: 31.9 G/DL (ref 31.5–35.7)
MCV RBC AUTO: 90.1 FL (ref 79–97)
PLATELET # BLD AUTO: 305 10*3/MM3 (ref 140–450)
PMV BLD AUTO: 9.2 FL (ref 6–12)
POTASSIUM SERPL-SCNC: 3.8 MMOL/L (ref 3.5–5.2)
RBC # BLD AUTO: 4.04 10*6/MM3 (ref 3.77–5.28)
SODIUM SERPL-SCNC: 140 MMOL/L (ref 136–145)
WBC NRBC COR # BLD: 5.19 10*3/MM3 (ref 3.4–10.8)

## 2023-09-08 PROCEDURE — 80048 BASIC METABOLIC PNL TOTAL CA: CPT

## 2023-09-08 PROCEDURE — 36415 COLL VENOUS BLD VENIPUNCTURE: CPT

## 2023-09-08 PROCEDURE — 85027 COMPLETE CBC AUTOMATED: CPT

## 2023-09-08 NOTE — DISCHARGE INSTRUCTIONS
Take the following medications the morning of surgery:  INSTRUCTED PT TO BRING HER RANOLAZINE AND HYDRALAZINE WITH HER DAY OF PROCEDURE    If you are on prescription narcotic pain medication to control your pain you may also take that medication the morning of surgery.    General Instructions:    Follow your surgeons instructions regarding when to stop solid foods and when to stop liquids.   Verify with your surgeon if you are to complete a bowel prep and when to do so.  Patients who avoid smoking, chewing tobacco and alcohol for 4 weeks prior to surgery have a reduced risk of post-operative complications.  Quit smoking as many days before surgery as you can.  Do not smoke, use chewing tobacco or drink alcohol the day of surgery.   If applicable bring your C-PAP/ BI-PAP machine in with you to preop day of surgery.  Bring any papers given to you in the doctor’s office.  Wear clean comfortable clothes.  Do not wear contact lenses, false eyelashes or make-up.  Bring a case for your glasses.   Bring crutches or walker if applicable.  Remove all piercings.  Leave jewelry and any other valuables at home.  Hair extensions with metal clips must be removed prior to surgery.  The Pre-Admission Testing nurse will instruct you to bring medications if unable to obtain an accurate list in Pre-Admission Testing.        If you were given a blood bank ID arm band remember to bring it with you the day of surgery.    Preventing a Surgical Site Infection:  For 2 to 3 days before surgery, avoid shaving with a razor because the razor can irritate skin and make it easier to develop an infection.    Any areas of open skin can increase the risk of a post-operative wound infection by allowing bacteria to enter and travel throughout the body.  Notify your surgeon if you have any skin wounds / rashes even if it is not near the expected surgical site.  The area will need assessed to determine if surgery should be delayed until it is  healed.  The night prior to surgery shower using a fresh bar of anti-bacterial soap (such as Dial) and clean washcloth.  Sleep in a clean bed with clean clothing.  Do not allow pets to sleep with you.  Shower on the morning of surgery using a fresh bar of anti-bacterial soap (such as Dial) and clean washcloth.  Dry with a clean towel and dress in clean clothing.  Ask your surgeon if you will be receiving antibiotics prior to surgery.  Make sure you, your family, and all healthcare providers clean their hands with soap and water or an alcohol based hand  before caring for you or your wound.    Day of surgery: 9/13/2023  ARRIVAL TIME 6:30 AM  Your arrival time is approximately two hours before your scheduled surgery time.  Upon arrival, a Pre-op nurse and Anesthesiologist will review your health history, obtain vital signs, and answer questions you may have.  The only belongings needed at this time will be a list of your home medications and if applicable your C-PAP/BI-PAP machine.  A Pre-op nurse will start an IV and you may receive medication in preparation for surgery, including something to help you relax.     Please be aware that surgery does come with discomfort.  We want to make every effort to control your discomfort so please discuss any uncontrolled symptoms with your nurse.   Your doctor will most likely have prescribed pain medications.      If you are going home after surgery you will receive individualized written care instructions before being discharged.  A responsible adult must drive you to and from the hospital on the day of your surgery and stay with you for 24 hours.  Discharge prescriptions can be filled by the hospital pharmacy during regular pharmacy hours.  If you are having surgery late in the day/evening your prescription may be e-prescribed to your pharmacy.  Please verify your pharmacy hours or chose a 24 hour pharmacy to avoid not having access to your prescription because your  pharmacy has closed for the day.    If you are staying overnight following surgery, you will be transported to your hospital room following the recovery period.  [facility] has all private rooms.    If you have any questions please call Pre-Admission Testing at [phone].  Deductibles and co-payments are collected on the day of service. Please be prepared to pay the required co-pay, deductible or deposit on the day of service as defined by your plan.    Call your surgeon immediately if you experience any of the following symptoms:  Sore Throat  Shortness of Breath or difficulty breathing  Cough  Chills  Body soreness or muscle pain  Headache  Fever  New loss of taste or smell  Do not arrive for your surgery ill.  Your procedure will need to be rescheduled to another time.  You will need to call your physician before the day of surgery to avoid any unnecessary exposure to hospital staff as well as other patients.

## 2023-09-08 NOTE — TELEPHONE ENCOUNTER
PA Case: [id number], Status: Approved, Coverage Starts on: 1/1/2023 12:00:00 AM, Coverage Ends on: 12/31/2023 12:00:00 AM. Questions? Contact [phone].

## 2023-09-13 ENCOUNTER — ANESTHESIA (OUTPATIENT)
Dept: GASTROENTEROLOGY | Facility: HOSPITAL | Age: 68
End: 2023-09-13
Payer: MEDICARE

## 2023-09-13 ENCOUNTER — HOSPITAL ENCOUNTER (OUTPATIENT)
Facility: HOSPITAL | Age: 68
Setting detail: HOSPITAL OUTPATIENT SURGERY
Discharge: HOME | End: 2023-09-13
Attending: COLON & RECTAL SURGERY | Admitting: COLON & RECTAL SURGERY
Payer: MEDICARE

## 2023-09-13 ENCOUNTER — ANESTHESIA EVENT (OUTPATIENT)
Dept: GASTROENTEROLOGY | Facility: HOSPITAL | Age: 68
End: 2023-09-13
Payer: MEDICARE

## 2023-09-13 VITALS
DIASTOLIC BLOOD PRESSURE: 73 MMHG | HEIGHT: 58 IN | HEART RATE: 82 BPM | OXYGEN SATURATION: 98 % | SYSTOLIC BLOOD PRESSURE: 156 MMHG | RESPIRATION RATE: 16 BRPM | BODY MASS INDEX: 26.03 KG/M2 | WEIGHT: 124 LBS

## 2023-09-13 DIAGNOSIS — K62.5 RECTAL BLEEDING: ICD-10-CM

## 2023-09-13 DIAGNOSIS — R19.4 CHANGE IN BOWEL HABITS: ICD-10-CM

## 2023-09-13 PROCEDURE — 88305 TISSUE EXAM BY PATHOLOGIST: CPT | Performed by: COLON & RECTAL SURGERY

## 2023-09-13 PROCEDURE — 25010000002 PROPOFOL 10 MG/ML EMULSION: Performed by: ANESTHESIOLOGY

## 2023-09-13 RX ORDER — SODIUM CHLORIDE, SODIUM LACTATE, POTASSIUM CHLORIDE, CALCIUM CHLORIDE 600; 310; 30; 20 MG/100ML; MG/100ML; MG/100ML; MG/100ML
30 INJECTION, SOLUTION INTRAVENOUS CONTINUOUS
Status: DISCONTINUED | OUTPATIENT
Start: 2023-09-13 | End: 2023-09-13 | Stop reason: HOSPADM

## 2023-09-13 RX ORDER — PROPOFOL 10 MG/ML
VIAL (ML) INTRAVENOUS CONTINUOUS PRN
Status: DISCONTINUED | OUTPATIENT
Start: 2023-09-13 | End: 2023-09-13 | Stop reason: SURG

## 2023-09-13 RX ADMIN — PROPOFOL 50 MCG/KG/MIN: 10 INJECTION, EMULSION INTRAVENOUS at 07:33

## 2023-09-13 RX ADMIN — SODIUM CHLORIDE, POTASSIUM CHLORIDE, SODIUM LACTATE AND CALCIUM CHLORIDE: 600; 310; 30; 20 INJECTION, SOLUTION INTRAVENOUS at 07:30

## 2023-09-13 NOTE — DISCHARGE INSTRUCTIONS
For the next 24 hours patient needs to be with a responsible adult.    For 24 hours DO NOT drive, operate machinery, appliances, drink alcohol, make important decisions or sign legal documents.    Start with a light or bland diet if you are feeling sick to your stomach otherwise advance to regular diet as tolerated.    Follow recommendations on procedure report if provided by your doctor.    Call Dr. Carrington for problems [phone]    Problems may include but not limited to: large amounts of bleeding, trouble breathing, repeated vomiting, severe unrelieved pain, fever or chills.
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
## 2023-09-13 NOTE — ANESTHESIA PREPROCEDURE EVALUATION
Anesthesia Evaluation     Patient summary reviewed and Nursing notes reviewed                Airway   Mallampati: II  TM distance: >3 FB  Neck ROM: limited  Dental      Pulmonary - negative pulmonary ROS   Cardiovascular     ECG reviewed  PT is on anticoagulation therapy  Rhythm: regular  Rate: normal    (+) hypertension, past MI , CAD, cardiac stents Drug eluting stent , dysrhythmias Paroxysmal Atrial Fib, angina, hyperlipidemia      Neuro/Psych- negative ROS  GI/Hepatic/Renal/Endo    (+) PUD, GI bleeding , diabetes mellitus type 2    Musculoskeletal (-) negative ROS    Abdominal    Substance History - negative use     OB/GYN negative ob/gyn ROS         Other                        Anesthesia Plan    ASA 3     MAC     intravenous induction     Anesthetic plan, risks, benefits, and alternatives have been provided, discussed and informed consent has been obtained with: patient.      CODE STATUS:

## 2023-09-13 NOTE — ANESTHESIA POSTPROCEDURE EVALUATION
Patient: Domitila Paiz    Procedure Summary       Date: 09/13/23 Room / Location: [facility] ENDOSCOPY [facility] / [facility]    Anesthesia Start: 0730 Anesthesia Stop: 0753    Procedure: COLONOSCOPY to cecum with biopsy / polypectomy Diagnosis:       Change in bowel habits      Rectal bleeding      (Change in bowel habits [R19.4])      (Rectal bleeding [K62.5])    Surgeons: Amparo Carrington MD Provider: Papo Mac MD    Anesthesia Type: MAC ASA Status: 3            Anesthesia Type: MAC    Vitals  Vitals Value Taken Time   /59 09/13/23 0753   Temp     Pulse 69 09/13/23 0753   Resp 16 09/13/23 0753   SpO2 98 % 09/13/23 0753           Post Anesthesia Care and Evaluation    Patient location during evaluation: PACU  Patient participation: complete - patient participated  Level of consciousness: awake and alert  Pain management: adequate    Airway patency: patent  Anesthetic complications: No anesthetic complications    Cardiovascular status: acceptable  Respiratory status: acceptable  Hydration status: acceptable    Comments: --------------------            09/13/23 0753     --------------------   BP:       104/59     Pulse:      69       Resp:       16       SpO2:      98%      --------------------

## 2023-09-14 LAB
LAB AP CASE REPORT: NORMAL
LAB AP CLINICAL INFORMATION: NORMAL
PATH REPORT.FINAL DX SPEC: NORMAL
PATH REPORT.GROSS SPEC: NORMAL

## 2023-10-02 ENCOUNTER — TELEPHONE (OUTPATIENT)
Dept: SURGERY | Facility: CLINIC | Age: 68
End: 2023-10-02
Payer: MEDICARE

## 2023-10-02 ENCOUNTER — PREP FOR SURGERY (OUTPATIENT)
Dept: OTHER | Facility: HOSPITAL | Age: 68
End: 2023-10-02
Payer: MEDICARE

## 2023-10-02 DIAGNOSIS — Z86.010 HISTORY OF COLON POLYPS: Primary | ICD-10-CM

## 2023-10-02 NOTE — TELEPHONE ENCOUNTER
Message was left for patient to call Amena at the office.  I was calling to schedule a 6 month colonoscopy.

## 2023-10-09 PROBLEM — Z86.0100 HISTORY OF COLON POLYPS: Status: ACTIVE | Noted: 2023-10-02

## 2023-10-09 PROBLEM — Z86.010 HISTORY OF COLON POLYPS: Status: ACTIVE | Noted: 2023-10-02

## 2023-10-18 ENCOUNTER — HOSPITAL ENCOUNTER (EMERGENCY)
Facility: HOSPITAL | Age: 68
Discharge: HOME | End: 2023-10-19
Attending: STUDENT IN AN ORGANIZED HEALTH CARE EDUCATION/TRAINING PROGRAM
Payer: MEDICARE

## 2023-10-18 ENCOUNTER — APPOINTMENT (OUTPATIENT)
Dept: GENERAL RADIOLOGY | Facility: HOSPITAL | Age: 68
End: 2023-10-18
Payer: MEDICARE

## 2023-10-18 ENCOUNTER — APPOINTMENT (OUTPATIENT)
Dept: CT IMAGING | Facility: HOSPITAL | Age: 68
End: 2023-10-18
Payer: MEDICARE

## 2023-10-18 ENCOUNTER — TELEPHONE (OUTPATIENT)
Dept: SURGERY | Facility: CLINIC | Age: 68
End: 2023-10-18
Payer: MEDICARE

## 2023-10-18 DIAGNOSIS — M25.551 RIGHT HIP PAIN: Primary | ICD-10-CM

## 2023-10-18 LAB
ALBUMIN SERPL-MCNC: 3.8 G/DL (ref 3.5–5.2)
ALBUMIN/GLOB SERPL: 1.4 G/DL
ALP SERPL-CCNC: 94 U/L (ref 39–117)
ALT SERPL W P-5'-P-CCNC: 17 U/L (ref 1–33)
ANION GAP SERPL CALCULATED.3IONS-SCNC: 9.2 MMOL/L (ref 5–15)
AST SERPL-CCNC: 17 U/L (ref 1–32)
BASOPHILS # BLD AUTO: 0.04 10*3/MM3 (ref 0–0.2)
BASOPHILS NFR BLD AUTO: 0.4 % (ref 0–1.5)
BILIRUB SERPL-MCNC: <0.2 MG/DL (ref 0–1.2)
BUN SERPL-MCNC: 15 MG/DL (ref 8–23)
BUN/CREAT SERPL: 15.3 (ref 7–25)
CALCIUM SPEC-SCNC: 8.9 MG/DL (ref 8.6–10.5)
CHLORIDE SERPL-SCNC: 103 MMOL/L (ref 98–107)
CO2 SERPL-SCNC: 25.8 MMOL/L (ref 22–29)
CREAT SERPL-MCNC: 0.98 MG/DL (ref 0.57–1)
DEPRECATED RDW RBC AUTO: 47.9 FL (ref 37–54)
EGFRCR SERPLBLD CKD-EPI 2021: 63.4 ML/MIN/1.73
EOSINOPHIL # BLD AUTO: 0.22 10*3/MM3 (ref 0–0.4)
EOSINOPHIL NFR BLD AUTO: 2.4 % (ref 0.3–6.2)
ERYTHROCYTE [DISTWIDTH] IN BLOOD BY AUTOMATED COUNT: 14.3 % (ref 12.3–15.4)
GLOBULIN UR ELPH-MCNC: 2.8 GM/DL
GLUCOSE SERPL-MCNC: 182 MG/DL (ref 65–99)
HCT VFR BLD AUTO: 34.3 % (ref 34–46.6)
HGB BLD-MCNC: 10.5 G/DL (ref 12–15.9)
IMM GRANULOCYTES # BLD AUTO: 0.02 10*3/MM3 (ref 0–0.05)
IMM GRANULOCYTES NFR BLD AUTO: 0.2 % (ref 0–0.5)
LYMPHOCYTES # BLD AUTO: 3.3 10*3/MM3 (ref 0.7–3.1)
LYMPHOCYTES NFR BLD AUTO: 36.6 % (ref 19.6–45.3)
MCH RBC QN AUTO: 27.4 PG (ref 26.6–33)
MCHC RBC AUTO-ENTMCNC: 30.6 G/DL (ref 31.5–35.7)
MCV RBC AUTO: 89.6 FL (ref 79–97)
MONOCYTES # BLD AUTO: 0.9 10*3/MM3 (ref 0.1–0.9)
MONOCYTES NFR BLD AUTO: 10 % (ref 5–12)
NEUTROPHILS NFR BLD AUTO: 4.53 10*3/MM3 (ref 1.7–7)
NEUTROPHILS NFR BLD AUTO: 50.4 % (ref 42.7–76)
PLATELET # BLD AUTO: 329 10*3/MM3 (ref 140–450)
PMV BLD AUTO: 9.3 FL (ref 6–12)
POTASSIUM SERPL-SCNC: 3.5 MMOL/L (ref 3.5–5.2)
PROT SERPL-MCNC: 6.6 G/DL (ref 6–8.5)
RBC # BLD AUTO: 3.83 10*6/MM3 (ref 3.77–5.28)
SODIUM SERPL-SCNC: 138 MMOL/L (ref 136–145)
WBC NRBC COR # BLD: 9.01 10*3/MM3 (ref 3.4–10.8)

## 2023-10-18 PROCEDURE — 73552 X-RAY EXAM OF FEMUR 2/>: CPT

## 2023-10-18 PROCEDURE — 96374 THER/PROPH/DIAG INJ IV PUSH: CPT

## 2023-10-18 PROCEDURE — 72131 CT LUMBAR SPINE W/O DYE: CPT

## 2023-10-18 PROCEDURE — 80053 COMPREHEN METABOLIC PANEL: CPT | Performed by: STUDENT IN AN ORGANIZED HEALTH CARE EDUCATION/TRAINING PROGRAM

## 2023-10-18 PROCEDURE — 85025 COMPLETE CBC W/AUTO DIFF WBC: CPT | Performed by: STUDENT IN AN ORGANIZED HEALTH CARE EDUCATION/TRAINING PROGRAM

## 2023-10-18 PROCEDURE — 25010000002 ONDANSETRON PER 1 MG: Performed by: STUDENT IN AN ORGANIZED HEALTH CARE EDUCATION/TRAINING PROGRAM

## 2023-10-18 PROCEDURE — 72192 CT PELVIS W/O DYE: CPT

## 2023-10-18 PROCEDURE — 25010000002 FENTANYL CITRATE (PF) 50 MCG/ML SOLUTION: Performed by: STUDENT IN AN ORGANIZED HEALTH CARE EDUCATION/TRAINING PROGRAM

## 2023-10-18 PROCEDURE — 25010000002 HYDROMORPHONE PER 4 MG: Performed by: STUDENT IN AN ORGANIZED HEALTH CARE EDUCATION/TRAINING PROGRAM

## 2023-10-18 PROCEDURE — 96375 TX/PRO/DX INJ NEW DRUG ADDON: CPT

## 2023-10-18 PROCEDURE — 99284 EMERGENCY DEPT VISIT MOD MDM: CPT

## 2023-10-18 RX ORDER — ONDANSETRON 2 MG/ML
4 INJECTION INTRAMUSCULAR; INTRAVENOUS ONCE
Status: COMPLETED | OUTPATIENT
Start: 2023-10-18 | End: 2023-10-18

## 2023-10-18 RX ORDER — ONDANSETRON 4 MG/1
4 TABLET, FILM COATED ORAL ONCE
Status: DISCONTINUED | OUTPATIENT
Start: 2023-10-18 | End: 2023-10-18

## 2023-10-18 RX ORDER — SODIUM CHLORIDE 0.9 % (FLUSH) 0.9 %
10 SYRINGE (ML) INJECTION AS NEEDED
Status: DISCONTINUED | OUTPATIENT
Start: 2023-10-18 | End: 2023-10-19 | Stop reason: HOSPADM

## 2023-10-18 RX ORDER — FENTANYL CITRATE 50 UG/ML
50 INJECTION, SOLUTION INTRAMUSCULAR; INTRAVENOUS ONCE
Status: COMPLETED | OUTPATIENT
Start: 2023-10-18 | End: 2023-10-18

## 2023-10-18 RX ORDER — HYDROMORPHONE HYDROCHLORIDE 1 MG/ML
0.5 INJECTION, SOLUTION INTRAMUSCULAR; INTRAVENOUS; SUBCUTANEOUS ONCE
Status: COMPLETED | OUTPATIENT
Start: 2023-10-18 | End: 2023-10-18

## 2023-10-18 RX ADMIN — FENTANYL CITRATE 50 MCG: 0.05 INJECTION, SOLUTION INTRAMUSCULAR; INTRAVENOUS at 22:23

## 2023-10-18 RX ADMIN — Medication 10 ML: at 23:13

## 2023-10-18 RX ADMIN — ONDANSETRON 4 MG: 2 INJECTION INTRAMUSCULAR; INTRAVENOUS at 22:21

## 2023-10-18 RX ADMIN — HYDROMORPHONE HYDROCHLORIDE 0.5 MG: 1 INJECTION, SOLUTION INTRAMUSCULAR; INTRAVENOUS; SUBCUTANEOUS at 23:13

## 2023-10-18 NOTE — TELEPHONE ENCOUNTER
Hub staff attempted to follow warm transfer process and was unsuccessful     Caller: SHAJI ERICKSON     Relationship to patient:  DAUGHTER     Best call back number: [phone]     Patient is needing: PATIENT SAYS SHE HAD COLONOSCOPY ON 09.13.23 AND NOW WHEN HAVING BOWEL MOVEMENT IT IS MUCAS LIKE, PATIENT IS ALSO HAVING UPPER AND LOWER ABDOMINAL PAINS WOULD LIKE TO TO KNOW IF THIS NORMAL

## 2023-10-18 NOTE — TELEPHONE ENCOUNTER
"Pain stated about 1.5 weeks ago, said about 2 weeks after colonoscopy. Pain is middle of rib cage area and goes down the left side of abdomen. Pain is intermittent but happens mostly after eating. Pt said she has only had 1 normal BM since colonoscopy, since then it has been liquid stools with a lot of creamy brown mucous. Pt also says stools smell \"horrible\". Pt denies prolapsing tissue and fever but says she has intermittent chills. Pt denies recent ABX use.  "

## 2023-10-19 ENCOUNTER — OFFICE VISIT (OUTPATIENT)
Dept: SURGERY | Facility: CLINIC | Age: 68
End: 2023-10-19
Payer: MEDICARE

## 2023-10-19 VITALS
HEART RATE: 75 BPM | DIASTOLIC BLOOD PRESSURE: 79 MMHG | RESPIRATION RATE: 15 BRPM | WEIGHT: 122 LBS | SYSTOLIC BLOOD PRESSURE: 151 MMHG | HEIGHT: 58 IN | TEMPERATURE: 97.9 F | BODY MASS INDEX: 25.61 KG/M2 | OXYGEN SATURATION: 95 %

## 2023-10-19 VITALS
BODY MASS INDEX: 25.6 KG/M2 | OXYGEN SATURATION: 100 % | HEART RATE: 85 BPM | SYSTOLIC BLOOD PRESSURE: 124 MMHG | DIASTOLIC BLOOD PRESSURE: 74 MMHG | WEIGHT: 127 LBS | HEIGHT: 59 IN

## 2023-10-19 DIAGNOSIS — R19.7 DIARRHEA OF PRESUMED INFECTIOUS ORIGIN: Primary | ICD-10-CM

## 2023-10-19 RX ORDER — CYCLOBENZAPRINE HCL 5 MG
5 TABLET ORAL 3 TIMES DAILY PRN
Qty: 30 TABLET | Refills: 0 | Status: SHIPPED | OUTPATIENT
Start: 2023-10-19

## 2023-10-19 RX ORDER — OXYCODONE HYDROCHLORIDE AND ACETAMINOPHEN 5; 325 MG/1; MG/1
1 TABLET ORAL EVERY 4 HOURS PRN
Qty: 12 TABLET | Refills: 0 | Status: SHIPPED | OUTPATIENT
Start: 2023-10-19

## 2023-10-19 RX ORDER — CYCLOBENZAPRINE HCL 10 MG
10 TABLET ORAL ONCE
Status: COMPLETED | OUTPATIENT
Start: 2023-10-19 | End: 2023-10-19

## 2023-10-19 RX ORDER — ALBUTEROL SULFATE 90 UG/1
2 AEROSOL, METERED RESPIRATORY (INHALATION)
COMMUNITY
Start: 2023-09-15

## 2023-10-19 RX ADMIN — CYCLOBENZAPRINE 10 MG: 10 TABLET, FILM COATED ORAL at 00:20
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
## 2023-10-19 NOTE — ED NOTES
Pt returned to room from radiology. Radiology tech notified nurse that pt crying and in pain from movement related to getting images done. Pt requesting pain medication. MD Wise made aware

## 2023-10-19 NOTE — PROGRESS NOTES
"Domitila Paiz is a 67 y.o. female in for follow up of Diarrhea of presumed infectious origin    Pt presents today for evaluation of diarrhea that started 1.5-2 weeks ago.   Had 1 \"normal\" BM since her colonoscopy.   Passing mostly liquid stool with mucus.  Foul smell.   Experiencing intermittent epigastric and LLQ abdominal pain.   Some nausea, but no vomiting.    Having chills, but states she fluctuates between hot and cold at baseline. No known fevers.     Took Imodium and has not had a BM in 5 days.     Grandson came home today from school with GI bug. No other ill contacts.   No Hx of C. Diff.  Last treated with antibiotics 2-3 months ago for a sinus infection. Treated with Amoxicillin.   Traveled to [place] last week, after symptom onset. Denies any other travel.   Denies eating any new, raw, or undercooked foods.    /74 (BP Location: Left arm, Patient Position: Sitting, Cuff Size: Small Adult)   Pulse 85   Ht 149.9 cm (59\")   Wt 57.6 kg (127 lb)   SpO2 100%   Breastfeeding No   BMI 25.65 kg/m²   Body mass index is 25.65 kg/m².      PE:  Physical Exam  Constitutional:       General: She is not in acute distress.     Appearance: She is well-developed.   HENT:      Head: Normocephalic and atraumatic.      Nose: Nose normal.   Eyes:      Conjunctiva/sclera: Conjunctivae normal.      Pupils: Pupils are equal, round, and reactive to light.   Neck:      Trachea: No tracheal deviation.   Pulmonary:      Effort: Pulmonary effort is normal. No respiratory distress.      Breath sounds: Normal breath sounds.   Abdominal:      General: Bowel sounds are normal. There is no distension.      Palpations: Abdomen is soft.   Musculoskeletal:         General: No deformity. Normal range of motion.      Cervical back: Normal range of motion.   Skin:     General: Skin is warm and dry.   Neurological:      Mental Status: She is alert and oriented to person, place, and time.      Cranial Nerves: No cranial nerve " deficit.      Coordination: Coordination normal.      Gait: Gait normal.   Psychiatric:         Behavior: Behavior normal.         Thought Content: Thought content normal.         Judgment: Judgment normal.       Review of Medical Record:    Colonoscopy 09/13/2023:  - 8 mm Hyperplastic Polyp, Anus. Incomplete resection.   - Exam otherwise WNL  - Dr. Amparo Carrington, [facility]    Assessment:   1. Diarrhea of presumed infectious origin    - New    Plan:  - Will check stool studies to assess for infectious etiology   - Pt instructed to avoid antidiarrheals until infectious source has been ruled out  - Follow up depending on results of stool studies

## 2023-10-23 LAB — C DIFF TOX GENS STL QL NAA+PROBE: NEGATIVE

## 2023-10-23 PROCEDURE — 87046 STOOL CULTR AEROBIC BACT EA: CPT | Performed by: PHYSICIAN ASSISTANT

## 2023-10-23 PROCEDURE — 87427 SHIGA-LIKE TOXIN AG IA: CPT | Performed by: PHYSICIAN ASSISTANT

## 2023-10-23 PROCEDURE — 87045 FECES CULTURE AEROBIC BACT: CPT | Performed by: PHYSICIAN ASSISTANT

## 2023-10-27 LAB
BACTERIA SPEC CULT: NORMAL
BACTERIA SPEC CULT: NORMAL
CAMPYLOBACTER STL CULT: NORMAL
E COLI SXT STL QL IA: NEGATIVE
SALM + SHIG STL CULT: NORMAL

## 2023-10-31 ENCOUNTER — TELEPHONE (OUTPATIENT)
Dept: SURGERY | Facility: CLINIC | Age: 68
End: 2023-10-31
Payer: MEDICARE

## 2023-10-31 NOTE — TELEPHONE ENCOUNTER
Called and spoke with Pt. Stool studies negative. She states that BMs have improved and are back to normal. Denies any new concerns at this time.

## 2023-11-01 ENCOUNTER — TELEPHONE (OUTPATIENT)
Dept: CARDIOLOGY | Facility: CLINIC | Age: 68
End: 2023-11-01
Payer: MEDICARE

## 2023-11-01 NOTE — TELEPHONE ENCOUNTER
Patient is having a CT myelogram . They would like to stop her  Xarelto 5 days prior.     Their office can be reached at [phone] or fax number [phone]

## 2023-11-03 ENCOUNTER — TELEPHONE (OUTPATIENT)
Dept: CARDIOLOGY | Facility: CLINIC | Age: 68
End: 2023-11-03

## 2023-11-03 NOTE — TELEPHONE ENCOUNTER
Once we get lab results, we can get her started on Repatha. We will send a prescription to her pharmacy and complete a prior authorization to get it approved. We will notify her once we have approval from her insurance company.

## 2023-11-03 NOTE — TELEPHONE ENCOUNTER
Caller Name: ZECHARIAH  Relationship: SELF  Best Contact Number: [phone]    Patient is requesting samples of XARELTO 15 MG  How many days of medication do you have left? 4 DAYS    Additional Information:     WOULD LIKE INFORMATION OF COST SAVING PROGRAMS- THE LAST TIME SHE WENT TO PICK IT UP IT WAS $500.      2.   PT IS GOING TO GET HER LABS DONE NEXT WEEK, WOULD LIKE TO KNOW WHAT THE NEXT STEPS ARE TO GET THE REPATHA STARTED.

## 2023-11-06 ENCOUNTER — LAB (OUTPATIENT)
Dept: LAB | Facility: HOSPITAL | Age: 68
End: 2023-11-06
Payer: MEDICARE

## 2023-11-06 DIAGNOSIS — I25.118 CORONARY ARTERY DISEASE OF NATIVE ARTERY OF NATIVE HEART WITH STABLE ANGINA PECTORIS: ICD-10-CM

## 2023-11-06 LAB
CHOLEST SERPL-MCNC: 177 MG/DL (ref 0–200)
HDLC SERPL-MCNC: 53 MG/DL (ref 40–60)
LDLC SERPL CALC-MCNC: 92 MG/DL (ref 0–100)
LDLC/HDLC SERPL: 1.63 {RATIO}
TRIGL SERPL-MCNC: 188 MG/DL (ref 0–150)
VLDLC SERPL-MCNC: 32 MG/DL (ref 5–40)

## 2023-11-06 PROCEDURE — 80061 LIPID PANEL: CPT

## 2023-11-06 PROCEDURE — 36415 COLL VENOUS BLD VENIPUNCTURE: CPT

## 2023-11-06 NOTE — PROGRESS NOTES
Called and spoke to patient. While improved, she still has LDL greater than goal with her CAD history. Recommend PKS9 inh, she is agreeable and sent Rx to her pharmacy.

## 2023-12-05 RX ORDER — RIVAROXABAN 15 MG/1
15 TABLET, FILM COATED ORAL DAILY
Qty: 90 TABLET | Refills: 0 | Status: SHIPPED | OUTPATIENT
Start: 2023-12-05

## 2024-01-02 ENCOUNTER — TELEPHONE (OUTPATIENT)
Dept: CARDIOLOGY | Facility: CLINIC | Age: 69
End: 2024-01-02
Payer: MEDICARE

## 2024-01-05 ENCOUNTER — TELEPHONE (OUTPATIENT)
Dept: CARDIOLOGY | Facility: CLINIC | Age: 69
End: 2024-01-05
Payer: MEDICARE

## 2024-01-05 NOTE — TELEPHONE ENCOUNTER
Caller: ZECHARIAH ERICKSON    Relationship:    Callback number: [phone]   Is it ok to leave a message: [x] Yes [] No    Requested medication for samples: XARELTO 15 MG    How much medication does the patient currently have left: 2 DAYS    Who will be picking up the samples: SHAJI RASHID    Do you need information about patient financial assistance for this medication: [x] Yes [] No    Additional details provided:  PATIENT STATED THAT SHE HAS 2 DAYS OF XARELTO 15 MG MEDICATION AND THAT HER DAUGHTER WOULD BE PICKING THE SAMPLES UP.

## 2024-01-15 RX ORDER — HYDRALAZINE HYDROCHLORIDE 50 MG/1
50 TABLET, FILM COATED ORAL 2 TIMES DAILY
Qty: 180 TABLET | Refills: 3 | Status: SHIPPED | OUTPATIENT
Start: 2024-01-15

## 2024-01-25 ENCOUNTER — HOSPITAL ENCOUNTER (EMERGENCY)
Facility: HOSPITAL | Age: 69
Discharge: HOME | End: 2024-01-25
Attending: EMERGENCY MEDICINE
Payer: MEDICARE

## 2024-01-25 ENCOUNTER — APPOINTMENT (OUTPATIENT)
Dept: GENERAL RADIOLOGY | Facility: HOSPITAL | Age: 69
End: 2024-01-25
Payer: MEDICARE

## 2024-01-25 ENCOUNTER — APPOINTMENT (OUTPATIENT)
Dept: CT IMAGING | Facility: HOSPITAL | Age: 69
End: 2024-01-25
Payer: MEDICARE

## 2024-01-25 VITALS
WEIGHT: 120 LBS | RESPIRATION RATE: 16 BRPM | BODY MASS INDEX: 25.19 KG/M2 | OXYGEN SATURATION: 97 % | HEIGHT: 58 IN | SYSTOLIC BLOOD PRESSURE: 162 MMHG | DIASTOLIC BLOOD PRESSURE: 71 MMHG | TEMPERATURE: 98 F | HEART RATE: 77 BPM
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
## 2024-01-25 DIAGNOSIS — S09.90XA INJURY OF HEAD, INITIAL ENCOUNTER: ICD-10-CM

## 2024-01-25 DIAGNOSIS — T07.XXXA MULTIPLE CONTUSIONS: ICD-10-CM

## 2024-01-25 PROCEDURE — 73110 X-RAY EXAM OF WRIST: CPT

## 2024-01-25 PROCEDURE — 99284 EMERGENCY DEPT VISIT MOD MDM: CPT

## 2024-01-25 PROCEDURE — 70450 CT HEAD/BRAIN W/O DYE: CPT

## 2024-01-25 PROCEDURE — 72125 CT NECK SPINE W/O DYE: CPT

## 2024-01-25 PROCEDURE — 73502 X-RAY EXAM HIP UNI 2-3 VIEWS: CPT

## 2024-01-25 PROCEDURE — 73070 X-RAY EXAM OF ELBOW: CPT

## 2024-01-25 RX ORDER — HYDROCODONE BITARTRATE AND ACETAMINOPHEN 5; 325 MG/1; MG/1
1 TABLET ORAL ONCE AS NEEDED
Status: DISCONTINUED | OUTPATIENT
Start: 2024-01-25 | End: 2024-01-25 | Stop reason: HOSPADM

## 2024-01-25 RX ORDER — TRAMADOL HYDROCHLORIDE 50 MG/1
50 TABLET ORAL EVERY 6 HOURS PRN
Qty: 12 TABLET | Refills: 0 | Status: SHIPPED | OUTPATIENT
Start: 2024-01-25

## 2024-01-25 RX ORDER — NALOXONE HYDROCHLORIDE 4 MG/.1ML
SPRAY NASAL
Qty: 2 EACH | Refills: 0 | Status: SHIPPED | OUTPATIENT
Start: 2024-01-25

## 2024-01-25 RX ORDER — BACLOFEN 10 MG/1
10 TABLET ORAL 3 TIMES DAILY
Qty: 12 TABLET | Refills: 0 | Status: SHIPPED | OUTPATIENT
Start: 2024-01-25

## 2024-01-25 RX ADMIN — HYDROCODONE BITARTRATE AND ACETAMINOPHEN 1 TABLET: 5; 325 TABLET ORAL at 20:50

## 2024-01-26 NOTE — DISCHARGE INSTRUCTIONS
Rest, ice, compression, elevation for routine management of injuries.  Use Tylenol regularly as directed.  Use tramadol as prescribed as needed.  Do not combine with other narcotic pain medications.  Use muscle relaxers as prescribed.  May cause drowsiness.  Do not drive.  Return to emergency department for worsening symptoms or other medical emergencies.  Recommended follow-up with PCP.  Refer to the attached instructions for further information.
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
## 2024-01-30 ENCOUNTER — TELEPHONE (OUTPATIENT)
Dept: CARDIOLOGY | Facility: CLINIC | Age: 69
End: 2024-01-30
Payer: MEDICARE

## 2024-01-30 NOTE — TELEPHONE ENCOUNTER
Caller: ZECHARIAH ERICKSON    Relationship:PATIENT    Callback number: [phone]    Is it ok to leave a message: [] Yes [] No    Requested medication for samples: XARELTO    How much medication does the patient currently have left: 5 LEFT    Who will be picking up the samples: ZECHARIAH OR DAUGHTER (SHAJI) OR  (KATARINA)    Do you need information about patient financial assistance for this medication: [x] Yes [] No    Additional details provided:

## 2024-02-06 ENCOUNTER — TELEPHONE (OUTPATIENT)
Dept: SURGERY | Facility: CLINIC | Age: 69
End: 2024-02-06

## 2024-02-06 NOTE — TELEPHONE ENCOUNTER
Caller: ZECHARIAH ERICKSON    Relationship to patient: SELF    Best call back number: [phone] (home)       Chief complaint: COLONOSCOPY    Type of visit: SURGERY    Requested date:  1ST OR 2ND WEEK OF APRIL    If rescheduling, when is the original appointment:  3.20.24     Additional notes:BACK SURGERY 3 WEEKS PRIOR TO CURRENT CSCOPE APPT; NEED MORE TIME TO HEAL

## 2024-02-19 ENCOUNTER — TELEPHONE (OUTPATIENT)
Dept: CARDIOLOGY | Facility: CLINIC | Age: 69
End: 2024-02-19
Payer: MEDICARE

## 2024-02-19 NOTE — TELEPHONE ENCOUNTER
Patient is having a L2-L3 POSTERIOR LUMBAR INTERBODY FUSION VERSUS TRANSFORAMINAL LUMBAR INTERBODY FUSION.  will be the one doing the procedure. She is scheduled next Monday 02/26/24.     They are needing clearance, and when to stop her Xarelto 15 mg and ASA 81 mg.     Their office can be reached at [phone]

## 2024-02-20 NOTE — TELEPHONE ENCOUNTER
Called and spoke with pt. She is aware and verbalized understanding. I will fax this telephone encounter to [phone]

## 2024-02-20 NOTE — TELEPHONE ENCOUNTER
Patient is cleared for procedure  May hold Xarelto 2 days prior  May hold baby aspirin 5 days prior    Thank you!

## 2024-02-22 ENCOUNTER — TELEPHONE (OUTPATIENT)
Dept: CARDIOLOGY | Facility: CLINIC | Age: 69
End: 2024-02-22
Payer: MEDICARE

## 2024-02-22 NOTE — TELEPHONE ENCOUNTER
Hub staff attempted to follow warm transfer process and was unsuccessful     Caller: Domitila Paiz    Relationship to patient: Self    Best call back number:  [phone]    Patient is needing: PATIENT REQUESTING A CALL BACK FROM [facility]

## 2024-03-25 ENCOUNTER — OFFICE VISIT (OUTPATIENT)
Age: 69
End: 2024-03-25
Payer: MEDICARE

## 2024-03-25 VITALS
DIASTOLIC BLOOD PRESSURE: 80 MMHG | BODY MASS INDEX: 27.29 KG/M2 | WEIGHT: 130 LBS | HEART RATE: 84 BPM | HEIGHT: 58 IN | SYSTOLIC BLOOD PRESSURE: 128 MMHG

## 2024-03-25 DIAGNOSIS — I10 HYPERTENSION, UNSPECIFIED TYPE: ICD-10-CM

## 2024-03-25 DIAGNOSIS — I25.118 CORONARY ARTERY DISEASE OF NATIVE ARTERY OF NATIVE HEART WITH STABLE ANGINA PECTORIS: Primary | ICD-10-CM

## 2024-03-25 DIAGNOSIS — E78.2 MIXED HYPERLIPIDEMIA: ICD-10-CM

## 2024-03-25 DIAGNOSIS — I48.0 PAROXYSMAL ATRIAL FIBRILLATION: ICD-10-CM

## 2024-03-25 PROCEDURE — 1160F RVW MEDS BY RX/DR IN RCRD: CPT | Performed by: INTERNAL MEDICINE

## 2024-03-25 PROCEDURE — 1159F MED LIST DOCD IN RCRD: CPT | Performed by: INTERNAL MEDICINE

## 2024-03-25 PROCEDURE — 3079F DIAST BP 80-89 MM HG: CPT | Performed by: INTERNAL MEDICINE

## 2024-03-25 PROCEDURE — 99214 OFFICE O/P EST MOD 30 MIN: CPT | Performed by: INTERNAL MEDICINE

## 2024-03-25 PROCEDURE — 3074F SYST BP LT 130 MM HG: CPT | Performed by: INTERNAL MEDICINE

## 2024-03-25 PROCEDURE — 93000 ELECTROCARDIOGRAM COMPLETE: CPT | Performed by: INTERNAL MEDICINE
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
## 2024-04-18 RX ORDER — HYDROGEN PEROXIDE 2.65 ML/100ML
81 LIQUID ORAL; TOPICAL DAILY
Qty: 90 TABLET | Refills: 3 | Status: SHIPPED | OUTPATIENT
Start: 2024-04-18

## 2024-06-03 ENCOUNTER — TELEPHONE (OUTPATIENT)
Dept: CARDIOLOGY | Facility: CLINIC | Age: 69
End: 2024-06-03
Payer: MEDICARE

## 2024-06-03 NOTE — TELEPHONE ENCOUNTER
Caller: ZECHARIAH ERICKSON    Relationship:PATIENT    Callback number: [phone]    Is it ok to leave a message: [x] Yes [] No    Requested medication for samples: XARELTO    How much medication does the patient currently have left: 3-4 TABLETS    Who will be picking up the samples: PATIENT    Additional details provided: PT CALLING TO SPEAK WITH DAISY, SHE STATES SHE IS RUNNING OUT OF SAMPLES AND WILL BE IN TOWN TOMORROW 06.04.24

## 2024-06-18 RX ORDER — HYDROCODONE BITARTRATE AND ACETAMINOPHEN 7.5; 325 MG/1; MG/1
TABLET ORAL
COMMUNITY
Start: 2024-04-02

## 2024-06-19 ENCOUNTER — ANESTHESIA (OUTPATIENT)
Dept: GASTROENTEROLOGY | Facility: HOSPITAL | Age: 69
End: 2024-06-19
Payer: MEDICARE

## 2024-06-19 ENCOUNTER — ANESTHESIA EVENT (OUTPATIENT)
Dept: GASTROENTEROLOGY | Facility: HOSPITAL | Age: 69
End: 2024-06-19
Payer: MEDICARE

## 2024-06-19 ENCOUNTER — HOSPITAL ENCOUNTER (OUTPATIENT)
Facility: HOSPITAL | Age: 69
Setting detail: HOSPITAL OUTPATIENT SURGERY
Discharge: HOME | End: 2024-06-19
Attending: COLON & RECTAL SURGERY | Admitting: COLON & RECTAL SURGERY
Payer: MEDICARE

## 2024-06-19 VITALS
RESPIRATION RATE: 16 BRPM | SYSTOLIC BLOOD PRESSURE: 144 MMHG | DIASTOLIC BLOOD PRESSURE: 58 MMHG | OXYGEN SATURATION: 99 % | HEIGHT: 59 IN | HEART RATE: 93 BPM | WEIGHT: 121.5 LBS | BODY MASS INDEX: 24.49 KG/M2

## 2024-06-19 DIAGNOSIS — Z86.010 HISTORY OF COLON POLYPS: ICD-10-CM

## 2024-06-19 PROCEDURE — 25010000002 EPINEPHRINE PER 0.1 MG: Performed by: COLON & RECTAL SURGERY

## 2024-06-19 PROCEDURE — 25010000002 ONDANSETRON PER 1 MG: Performed by: COLON & RECTAL SURGERY

## 2024-06-19 PROCEDURE — 25010000002 PROPOFOL 200 MG/20ML EMULSION: Performed by: NURSE ANESTHETIST, CERTIFIED REGISTERED

## 2024-06-19 PROCEDURE — 25810000003 LACTATED RINGERS PER 1000 ML: Performed by: COLON & RECTAL SURGERY

## 2024-06-19 PROCEDURE — 88342 IMHCHEM/IMCYTCHM 1ST ANTB: CPT | Performed by: COLON & RECTAL SURGERY

## 2024-06-19 PROCEDURE — 25010000002 PROPOFOL 1000 MG/100ML EMULSION: Performed by: NURSE ANESTHETIST, CERTIFIED REGISTERED

## 2024-06-19 PROCEDURE — 88305 TISSUE EXAM BY PATHOLOGIST: CPT | Performed by: COLON & RECTAL SURGERY

## 2024-06-19 PROCEDURE — 25810000003 LACTATED RINGERS PER 1000 ML: Performed by: NURSE ANESTHETIST, CERTIFIED REGISTERED

## 2024-06-19 RX ORDER — ONDANSETRON 2 MG/ML
4 INJECTION INTRAMUSCULAR; INTRAVENOUS EVERY 6 HOURS PRN
Status: DISCONTINUED | OUTPATIENT
Start: 2024-06-19 | End: 2024-06-19 | Stop reason: HOSPADM

## 2024-06-19 RX ORDER — SODIUM CHLORIDE, SODIUM LACTATE, POTASSIUM CHLORIDE, CALCIUM CHLORIDE 600; 310; 30; 20 MG/100ML; MG/100ML; MG/100ML; MG/100ML
INJECTION, SOLUTION INTRAVENOUS CONTINUOUS PRN
Status: DISCONTINUED | OUTPATIENT
Start: 2024-06-19 | End: 2024-06-19 | Stop reason: SURG

## 2024-06-19 RX ORDER — PROPOFOL 10 MG/ML
INJECTION, EMULSION INTRAVENOUS CONTINUOUS PRN
Status: DISCONTINUED | OUTPATIENT
Start: 2024-06-19 | End: 2024-06-19 | Stop reason: SURG

## 2024-06-19 RX ORDER — SODIUM CHLORIDE, SODIUM LACTATE, POTASSIUM CHLORIDE, CALCIUM CHLORIDE 600; 310; 30; 20 MG/100ML; MG/100ML; MG/100ML; MG/100ML
1000 INJECTION, SOLUTION INTRAVENOUS CONTINUOUS
Status: DISCONTINUED | OUTPATIENT
Start: 2024-06-19 | End: 2024-06-19 | Stop reason: HOSPADM

## 2024-06-19 RX ORDER — LIDOCAINE HYDROCHLORIDE 20 MG/ML
INJECTION, SOLUTION INFILTRATION; PERINEURAL AS NEEDED
Status: DISCONTINUED | OUTPATIENT
Start: 2024-06-19 | End: 2024-06-19 | Stop reason: SURG

## 2024-06-19 RX ORDER — PROPOFOL 10 MG/ML
INJECTION, EMULSION INTRAVENOUS AS NEEDED
Status: DISCONTINUED | OUTPATIENT
Start: 2024-06-19 | End: 2024-06-19 | Stop reason: SURG

## 2024-06-19 RX ORDER — SODIUM CHLORIDE 0.9 % (FLUSH) 0.9 %
10 SYRINGE (ML) INJECTION AS NEEDED
Status: DISCONTINUED | OUTPATIENT
Start: 2024-06-19 | End: 2024-06-19 | Stop reason: HOSPADM

## 2024-06-19 RX ADMIN — ONDANSETRON 4 MG: 2 INJECTION INTRAMUSCULAR; INTRAVENOUS at 09:16

## 2024-06-19 RX ADMIN — PROPOFOL INJECTABLE EMULSION 70 MG: 10 INJECTION, EMULSION INTRAVENOUS at 08:16

## 2024-06-19 RX ADMIN — SODIUM CHLORIDE, POTASSIUM CHLORIDE, SODIUM LACTATE AND CALCIUM CHLORIDE 1000 ML: 600; 310; 30; 20 INJECTION, SOLUTION INTRAVENOUS at 08:10

## 2024-06-19 RX ADMIN — LIDOCAINE HYDROCHLORIDE 60 MG: 20 INJECTION, SOLUTION INFILTRATION; PERINEURAL at 08:16

## 2024-06-19 RX ADMIN — PROPOFOL 200 MCG/KG/MIN: 10 INJECTION, EMULSION INTRAVENOUS at 08:16

## 2024-06-19 RX ADMIN — SODIUM CHLORIDE, SODIUM LACTATE, POTASSIUM CHLORIDE, AND CALCIUM CHLORIDE: 600; 310; 30; 20 INJECTION, SOLUTION INTRAVENOUS at 08:11

## 2024-06-19 NOTE — DISCHARGE INSTRUCTIONS

## 2024-06-19 NOTE — ANESTHESIA POSTPROCEDURE EVALUATION
Patient: Domitila Paiz    Procedure Summary       Date: 06/19/24 Room / Location: [facility] ENDOSCOPY 6 / [facility] ENDOSCOPY    Anesthesia Start: 0811 Anesthesia Stop: 0840    Procedure: COLONOSCOPY to cecum with cold biopsy polypectomy and epinephrine injection Diagnosis:       History of colon polyps      (History of colon polyps [Z86.010])    Surgeons: Amparo Carrington MD Provider: Papo Mac MD    Anesthesia Type: MAC ASA Status: 3            Anesthesia Type: MAC    Vitals  Vitals Value Taken Time   BP 90/45 06/19/24 0841   Temp     Pulse 75 06/19/24 0842   Resp 16 06/19/24 0841   SpO2 100 % 06/19/24 0842   Vitals shown include unfiled device data.        Post Anesthesia Care and Evaluation    Patient location during evaluation: PACU  Patient participation: complete - patient participated  Level of consciousness: awake and alert  Pain management: adequate    Airway patency: patent  Anesthetic complications: No anesthetic complications    Cardiovascular status: acceptable  Respiratory status: acceptable  Hydration status: acceptable    Comments: --------------------            06/19/24               0841     --------------------   BP:       90/45      Pulse:      74       Resp:       16       SpO2:      100%     --------------------
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
## 2024-06-19 NOTE — NURSING NOTE
Pt states she is feeling better and ready to go home but had some concerns about how much blood was in the toilet. I assured her that it was from the polyp they removed in there rectum and she understood, offered for Dr. Carrington to pop in, she agreed. Dr. Carrington and Marian spoke and both in agreement to be dc and instrusted pt to continue to monitor, she states she understands.

## 2024-06-19 NOTE — ANESTHESIA PREPROCEDURE EVALUATION
Anesthesia Evaluation     Patient summary reviewed and Nursing notes reviewed                Airway   Mallampati: II  Dental      Pulmonary    (+) asthma,  Cardiovascular     ECG reviewed  PT is on anticoagulation therapy  Rhythm: regular  Rate: normal    (+) hypertension, past MI  >12 months, CAD, cardiac stents Drug eluting stent , dysrhythmias Paroxysmal Atrial Fib, angina, hyperlipidemia      Neuro/Psych  (+) headaches, dizziness/light headedness, numbness, psychiatric history Anxiety and Depression  GI/Hepatic/Renal/Endo    (+) PUD, GI bleeding , diabetes mellitus type 2    Musculoskeletal (-) negative ROS    Abdominal    Substance History - negative use     OB/GYN negative ob/gyn ROS         Other                      Anesthesia Plan    ASA 3     MAC     (PAF currently in NSR)  intravenous induction     Anesthetic plan, risks, benefits, and alternatives have been provided, discussed and informed consent has been obtained with: patient.    CODE STATUS:

## 2024-06-24 LAB
LAB AP CASE REPORT: NORMAL
LAB AP SPECIAL STAINS: NORMAL
PATH REPORT.FINAL DX SPEC: NORMAL
PATH REPORT.GROSS SPEC: NORMAL

## 2024-07-30 ENCOUNTER — TELEPHONE (OUTPATIENT)
Dept: CARDIOLOGY | Facility: CLINIC | Age: 69
End: 2024-07-30
Payer: MEDICARE

## 2024-07-30 NOTE — TELEPHONE ENCOUNTER
Caller: ZECHARIAH ERICKSON    Relationship:PT    Callback number: [phone]   Is it ok to leave a message: [x] Yes [] No    Requested medication for samples: XARELTO    How much medication does the patient currently have left: 2 PILLS    Who will be picking up the samples: YES    Do you need information about patient financial assistance for this medication: [] Yes [x] No    Additional details provided:

## 2024-08-20 ENCOUNTER — HOSPITAL ENCOUNTER (OUTPATIENT)
Facility: HOSPITAL | Age: 69
Discharge: HOME | End: 2024-08-20
Attending: EMERGENCY MEDICINE | Admitting: EMERGENCY MEDICINE
Payer: MEDICARE

## 2024-08-20 ENCOUNTER — APPOINTMENT (OUTPATIENT)
Dept: GENERAL RADIOLOGY | Facility: HOSPITAL | Age: 69
End: 2024-08-20
Payer: MEDICARE

## 2024-08-20 VITALS
DIASTOLIC BLOOD PRESSURE: 88 MMHG | SYSTOLIC BLOOD PRESSURE: 185 MMHG | BODY MASS INDEX: 24.19 KG/M2 | RESPIRATION RATE: 18 BRPM | WEIGHT: 120 LBS | HEIGHT: 59 IN | HEART RATE: 82 BPM | OXYGEN SATURATION: 98 % | TEMPERATURE: 98.3 F

## 2024-08-20 DIAGNOSIS — S93.401A SPRAIN OF RIGHT ANKLE, UNSPECIFIED LIGAMENT, INITIAL ENCOUNTER: Primary | ICD-10-CM

## 2024-08-20 DIAGNOSIS — S42.034A CLOSED NONDISPLACED FRACTURE OF ACROMIAL END OF RIGHT CLAVICLE, INITIAL ENCOUNTER: ICD-10-CM

## 2024-08-20 PROCEDURE — G0463 HOSPITAL OUTPT CLINIC VISIT: HCPCS

## 2024-08-20 PROCEDURE — 73610 X-RAY EXAM OF ANKLE: CPT

## 2024-08-20 PROCEDURE — 73562 X-RAY EXAM OF KNEE 3: CPT

## 2024-08-20 PROCEDURE — 73030 X-RAY EXAM OF SHOULDER: CPT

## 2024-08-20 RX ORDER — HYDROCODONE BITARTRATE AND ACETAMINOPHEN 5; 325 MG/1; MG/1
1 TABLET ORAL ONCE AS NEEDED
Status: DISCONTINUED | OUTPATIENT
Start: 2024-08-20 | End: 2024-08-20 | Stop reason: HOSPADM

## 2024-08-20 RX ORDER — TRAMADOL HYDROCHLORIDE 50 MG/1
50 TABLET ORAL EVERY 6 HOURS PRN
Qty: 12 TABLET | Refills: 0 | Status: SHIPPED | OUTPATIENT
Start: 2024-08-20

## 2024-08-20 RX ADMIN — HYDROCODONE BITARTRATE AND ACETAMINOPHEN 1 TABLET: 5; 325 TABLET ORAL at 17:26

## 2024-08-20 NOTE — DISCHARGE INSTRUCTIONS
Rest, ice, compression, elevation recommended.  Take Tylenol as indicated.   have also prescribed tramadol, which is a narcotic.  Can cause drowsiness.  Do not drive.  Recommend follow-up with orthopedics for reevaluation of the shoulder/clavicle.  Wear sling as needed until then.  Return to emergency department for worsening symptoms or other medical emergencies.  Recommended follow-up with PCP.  Refer to the attached instructions for further information.
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
## 2024-08-23 ENCOUNTER — HOSPITAL ENCOUNTER (OUTPATIENT)
Facility: HOSPITAL | Age: 69
Discharge: HOME | End: 2024-08-23
Attending: EMERGENCY MEDICINE | Admitting: EMERGENCY MEDICINE
Payer: MEDICARE

## 2024-08-23 ENCOUNTER — APPOINTMENT (OUTPATIENT)
Dept: GENERAL RADIOLOGY | Facility: HOSPITAL | Age: 69
End: 2024-08-23
Payer: MEDICARE

## 2024-08-23 VITALS
OXYGEN SATURATION: 98 % | BODY MASS INDEX: 24.19 KG/M2 | WEIGHT: 120 LBS | DIASTOLIC BLOOD PRESSURE: 77 MMHG | RESPIRATION RATE: 16 BRPM | SYSTOLIC BLOOD PRESSURE: 163 MMHG | HEART RATE: 89 BPM | HEIGHT: 59 IN | TEMPERATURE: 98.4 F

## 2024-08-23 DIAGNOSIS — J06.9 UPPER RESPIRATORY TRACT INFECTION, UNSPECIFIED TYPE: Primary | ICD-10-CM

## 2024-08-23 PROCEDURE — 87636 SARSCOV2 & INF A&B AMP PRB: CPT

## 2024-08-23 PROCEDURE — G0463 HOSPITAL OUTPT CLINIC VISIT: HCPCS

## 2024-08-23 PROCEDURE — 99214 OFFICE O/P EST MOD 30 MIN: CPT

## 2024-08-23 PROCEDURE — 87651 STREP A DNA AMP PROBE: CPT

## 2024-08-23 PROCEDURE — 25010000002 DEXAMETHASONE PER 1 MG

## 2024-08-23 PROCEDURE — 71046 X-RAY EXAM CHEST 2 VIEWS: CPT

## 2024-08-23 RX ORDER — DEXTROMETHORPHAN HYDROBROMIDE AND PROMETHAZINE HYDROCHLORIDE 15; 6.25 MG/5ML; MG/5ML
5 SYRUP ORAL 4 TIMES DAILY PRN
Qty: 118 ML | Refills: 0 | Status: SHIPPED | OUTPATIENT
Start: 2024-08-23

## 2024-08-23 RX ORDER — DOXYCYCLINE 100 MG/1
100 CAPSULE ORAL 2 TIMES DAILY
Qty: 14 CAPSULE | Refills: 0 | Status: SHIPPED | OUTPATIENT
Start: 2024-08-23 | End: 2024-08-30

## 2024-08-23 RX ADMIN — DEXAMETHASONE SODIUM PHOSPHATE 10 MG: 10 INJECTION, SOLUTION INTRAMUSCULAR; INTRAVENOUS at 18:17
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
## 2024-08-23 NOTE — DISCHARGE INSTRUCTIONS
You most likely have a viral upper respiratory infection.  Continue your over-the-counter cold and cough medications and albuterol inhaler as needed.  I have also prescribed a Phenergan DM cough syrup.  Can cause drowsiness.  Do not drive.  Monitor your symptoms x 2 to 3 days.  If not improved, start antibiotic and take as prescribed until completion.  Return to emergency department for worsening symptoms or other medical emergencies.  Recommended follow-up with PCP.  Refer to the attached instructions for further information.

## 2024-08-27 ENCOUNTER — TELEPHONE (OUTPATIENT)
Dept: CARDIOLOGY | Facility: CLINIC | Age: 69
End: 2024-08-27
Payer: MEDICARE

## 2024-08-27 NOTE — TELEPHONE ENCOUNTER
Caller: Domitila Paiz    Relationship to patient: Self    Best call back number:  [phone]    Patient is needing: PATIENT REQUESTING SAMPLES OF XARLETO TO  ON THURSDAY.

## 2024-08-29 ENCOUNTER — NURSE TRIAGE (OUTPATIENT)
Dept: CALL CENTER | Facility: HOSPITAL | Age: 69
End: 2024-08-29
Payer: MEDICARE

## 2024-08-29 ENCOUNTER — HOSPITAL ENCOUNTER (OUTPATIENT)
Facility: HOSPITAL | Age: 69
Discharge: HOME | End: 2024-08-29
Attending: EMERGENCY MEDICINE | Admitting: EMERGENCY MEDICINE
Payer: MEDICARE

## 2024-08-29 ENCOUNTER — APPOINTMENT (OUTPATIENT)
Dept: GENERAL RADIOLOGY | Facility: HOSPITAL | Age: 69
End: 2024-08-29
Payer: MEDICARE

## 2024-08-29 VITALS
TEMPERATURE: 98.8 F | HEIGHT: 59 IN | RESPIRATION RATE: 18 BRPM | SYSTOLIC BLOOD PRESSURE: 133 MMHG | BODY MASS INDEX: 24.18 KG/M2 | DIASTOLIC BLOOD PRESSURE: 69 MMHG | WEIGHT: 119.93 LBS | OXYGEN SATURATION: 99 % | HEART RATE: 104 BPM

## 2024-08-29 DIAGNOSIS — R05.1 ACUTE COUGH: ICD-10-CM

## 2024-08-29 DIAGNOSIS — U07.1 COVID-19: Primary | ICD-10-CM

## 2024-08-29 LAB
FLUAV SUBTYP SPEC NAA+PROBE: NOT DETECTED
FLUBV RNA ISLT QL NAA+PROBE: NOT DETECTED
SARS-COV-2 RNA RESP QL NAA+PROBE: DETECTED
STREP A PCR: NOT DETECTED

## 2024-08-29 PROCEDURE — 87651 STREP A DNA AMP PROBE: CPT | Performed by: EMERGENCY MEDICINE

## 2024-08-29 PROCEDURE — 71046 X-RAY EXAM CHEST 2 VIEWS: CPT

## 2024-08-29 PROCEDURE — G0463 HOSPITAL OUTPT CLINIC VISIT: HCPCS | Performed by: PHYSICIAN ASSISTANT

## 2024-08-29 PROCEDURE — 87636 SARSCOV2 & INF A&B AMP PRB: CPT | Performed by: EMERGENCY MEDICINE

## 2024-08-29 PROCEDURE — 99214 OFFICE O/P EST MOD 30 MIN: CPT | Performed by: PHYSICIAN ASSISTANT

## 2024-08-29 RX ORDER — BROMPHENIRAMINE MALEATE, PSEUDOEPHEDRINE HYDROCHLORIDE, AND DEXTROMETHORPHAN HYDROBROMIDE 2; 30; 10 MG/5ML; MG/5ML; MG/5ML
5 SYRUP ORAL 4 TIMES DAILY PRN
Qty: 118 ML | Refills: 0 | Status: SHIPPED | OUTPATIENT
Start: 2024-08-29 | End: 2024-09-03

## 2024-08-29 RX ORDER — PREDNISONE 20 MG/1
20 TABLET ORAL 2 TIMES DAILY
Qty: 10 TABLET | Refills: 0 | Status: SHIPPED | OUTPATIENT
Start: 2024-08-29 | End: 2024-09-03

## 2024-08-29 NOTE — TELEPHONE ENCOUNTER
Was seen 2024  URI prescribed an antibiotic and cough medication  Negative for COVID Strep  Antibiotic started on Friday and symptoms are worse.    Went to [facility] yesterday 2024  Negative for everything    Coughing a lot just not getting any better.  She saw Tangela Licea on Friday and Tangela told them if she didn't get better to call back and maybe she could prescribe something stronger.  Advised to call PCP Can't see PCP until next month    Triage nurse called [facility] Tangela Licea is not working today.  Advised to call PCP or UC for re evaluation  Reason for Disposition   [1] Recent medical visit within 24 hours AND [2] condition / symptoms WORSE    Additional Information   Negative: Shock suspected (e.g., cold/pale/clammy skin, too weak to stand, low BP, rapid pulse)   Negative: Difficult to awaken or acting confused (e.g., disoriented, slurred speech)   Negative: Sounds like a life-threatening emergency to the triager   Negative: Recent (within last 24 hours) medical visit for an injury   Negative: Recent surgery or surgical procedure   Negative: Recent discharge from the hospital   Negative: Asthma attack diagnosed recently   Negative: Flu (influenza) diagnosed recently   Negative: Ear infection (otitis media, middle ear infection) diagnosed recently   Negative: Ear infection (otitis externa, swimmer's ear) diagnosed recently   Negative: [1] Sinus infection AND [2] taking an antibiotic   Negative: Skin infection (cellulitis) diagnosed recently   Negative: Strep throat (strep pharyngitis) diagnosed recently   Negative: Strep throat test result   Negative: Threatened miscarriage (threatened ) recently diagnosed   Negative: Urine infection (FEMALE; cystitis, pyelonephritis, urethritis) diagnosed recently   Negative: Urine infection (MALE; cystitis, pyelonephritis, prostatitis, epididymitis, orchitis, urethritis) diagnosed recently   Negative: Taking antibiotic for other  "infection   Negative: More than 24 hours since medical visit   Negative: [1] Drinking very little AND [2] dehydration suspected (e.g., no urine > 12 hours, very dry mouth, very lightheaded)   Negative: Patient sounds very sick or weak to the triager   Negative: Fever > 104 F (40 C)   Negative: [1] SEVERE pain (e.g., excruciating, pain scale 8-10) AND [2] not improved after pain medications    Answer Assessment - Initial Assessment Questions  1. MAIN CONCERN OR SYMPTOM:  \"What is your main concern right now?\" \"What question do you have?\" \"What's the main symptom you're worried about?\" (e.g., breathing difficulty, cough, fever. pain)  Coughing and just just not getting any better  2. ONSET: \"When did the  *No Answer*  start?\"      *No Answer*  3. BETTER-SAME-WORSE: \"Are you getting better, staying the same, or getting worse compared to how you felt at your last visit to the doctor (most recent medical visit)?\"    worse  4. VISIT DATE: \"When were you seen?\" (Date)    08/23/2024  5. VISIT DOCTOR: \"What is the name of the doctor taking care of you now?\"      Tangela shah [facility]  6. VISIT DIAGNOSIS:  \"What was the main symptom or problem that you were seen for?\" \"Were you given a diagnosis?\"      *No Answer*URI   7. VISIT MEDICINES: \"Did the doctor order any new medicines for you to use?\" If Yes, ask: \"Have you filled the prescription and started taking the medicine?\"       Antibiotic and cough medication  8. NEXT APPOINTMENT: \"Have you scheduled a follow-up appointment with your doctor?\"      *No Answer*  9. PAIN: \"Is there any pain?\" If Yes, ask: \"How bad is it?\"  (Scale 0-10; or mild, moderate, severe)     - NONE (0): no pain     - MILD (1-3): doesn't interfere with normal activities      - MODERATE (4-7): interferes with normal activities or awakens from sleep      - SEVERE (8-10): excruciating pain, unable to do any normal activities      *No Answer*  10. FEVER: \"Do you have a fever?\" If Yes, ask: \"What is " "it, how was it measured  and when did it start?\"  No fever  11. OTHER SYMPTOMS: \"Do you have any other symptoms?\"        *No Answer*    Protocols used: Recent Medical Visit for Illness Follow-up Call-ADULT-AH    "
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
## 2024-08-29 NOTE — DISCHARGE INSTRUCTIONS
Tested positive for COVID.  Chest x-ray does not indicate any signs of pneumonia.  Take the medications as prescribed.  Advise a follow-up with your primary care doctor next week to recheck your symptoms.

## 2024-09-08 ENCOUNTER — APPOINTMENT (OUTPATIENT)
Dept: GENERAL RADIOLOGY | Facility: HOSPITAL | Age: 69
End: 2024-09-08
Payer: MEDICARE

## 2024-09-08 ENCOUNTER — HOSPITAL ENCOUNTER (EMERGENCY)
Facility: HOSPITAL | Age: 69
Discharge: HOME | End: 2024-09-09
Attending: EMERGENCY MEDICINE | Admitting: EMERGENCY MEDICINE
Payer: MEDICARE

## 2024-09-08 DIAGNOSIS — S70.02XA CONTUSION OF LEFT HIP, INITIAL ENCOUNTER: ICD-10-CM

## 2024-09-08 DIAGNOSIS — S63.519A: ICD-10-CM

## 2024-09-08 DIAGNOSIS — S16.1XXA STRAIN OF NECK MUSCLE, INITIAL ENCOUNTER: ICD-10-CM

## 2024-09-08 DIAGNOSIS — S20.211A CONTUSION OF RIGHT CHEST WALL, INITIAL ENCOUNTER: Primary | ICD-10-CM

## 2024-09-08 PROCEDURE — 73110 X-RAY EXAM OF WRIST: CPT

## 2024-09-08 PROCEDURE — 73090 X-RAY EXAM OF FOREARM: CPT

## 2024-09-08 PROCEDURE — 73030 X-RAY EXAM OF SHOULDER: CPT

## 2024-09-08 PROCEDURE — 99284 EMERGENCY DEPT VISIT MOD MDM: CPT

## 2024-09-08 PROCEDURE — 73560 X-RAY EXAM OF KNEE 1 OR 2: CPT

## 2024-09-08 RX ORDER — HYDROCODONE BITARTRATE AND ACETAMINOPHEN 5; 325 MG/1; MG/1
1 TABLET ORAL ONCE AS NEEDED
Status: DISCONTINUED | OUTPATIENT
Start: 2024-09-08 | End: 2024-09-09 | Stop reason: HOSPADM

## 2024-09-08 RX ADMIN — HYDROCODONE BITARTRATE AND ACETAMINOPHEN 1 TABLET: 5; 325 TABLET ORAL at 22:54

## 2024-09-09 ENCOUNTER — APPOINTMENT (OUTPATIENT)
Dept: CT IMAGING | Facility: HOSPITAL | Age: 69
End: 2024-09-09
Payer: MEDICARE

## 2024-09-09 VITALS
OXYGEN SATURATION: 98 % | DIASTOLIC BLOOD PRESSURE: 84 MMHG | HEART RATE: 86 BPM | RESPIRATION RATE: 20 BRPM | BODY MASS INDEX: 24.18 KG/M2 | TEMPERATURE: 97.4 F | HEIGHT: 59 IN | SYSTOLIC BLOOD PRESSURE: 162 MMHG | WEIGHT: 119.93 LBS

## 2024-09-09 PROCEDURE — 71250 CT THORAX DX C-: CPT

## 2024-09-09 PROCEDURE — 72125 CT NECK SPINE W/O DYE: CPT

## 2024-09-09 PROCEDURE — 72131 CT LUMBAR SPINE W/O DYE: CPT

## 2024-09-09 PROCEDURE — 73700 CT LOWER EXTREMITY W/O DYE: CPT

## 2024-09-09 PROCEDURE — 99284 EMERGENCY DEPT VISIT MOD MDM: CPT | Performed by: EMERGENCY MEDICINE

## 2024-09-09 PROCEDURE — 70450 CT HEAD/BRAIN W/O DYE: CPT

## 2024-09-09 RX ORDER — CYCLOBENZAPRINE HCL 10 MG
10 TABLET ORAL 2 TIMES DAILY PRN
Qty: 14 TABLET | Refills: 0 | Status: SHIPPED | OUTPATIENT
Start: 2024-09-09 | End: 2024-09-16

## 2024-09-09 RX ORDER — HYDROCODONE BITARTRATE AND ACETAMINOPHEN 5; 325 MG/1; MG/1
1 TABLET ORAL EVERY 6 HOURS PRN
Qty: 12 TABLET | Refills: 0 | Status: SHIPPED | OUTPATIENT
Start: 2024-09-09

## 2024-09-09 RX ORDER — LIDOCAINE 50 MG/G
1 PATCH TOPICAL EVERY 24 HOURS
Qty: 30 PATCH | Refills: 0 | Status: SHIPPED | OUTPATIENT
Start: 2024-09-09

## 2024-09-09 RX ORDER — ACETAMINOPHEN 500 MG
500 TABLET ORAL EVERY 6 HOURS PRN
Qty: 30 TABLET | Refills: 0 | Status: SHIPPED | OUTPATIENT
Start: 2024-09-09

## 2024-09-09 NOTE — DISCHARGE INSTRUCTIONS
Return to EMD for increased pain, fever, vomiting or difficulty breathing. Drink plenty of fluids. No heavy lifting/exertion x 1 week.  Follow up with your PCM with the next week.  Follow-up with your PCM to get repeat x-rays of your wrist for full evaluation to assess for possible latent fracture
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
## 2024-10-01 ENCOUNTER — OFFICE VISIT (OUTPATIENT)
Dept: CARDIOLOGY | Facility: CLINIC | Age: 69
End: 2024-10-01
Payer: MEDICARE

## 2024-10-01 VITALS
WEIGHT: 123 LBS | HEIGHT: 59 IN | BODY MASS INDEX: 24.8 KG/M2 | OXYGEN SATURATION: 97 % | DIASTOLIC BLOOD PRESSURE: 80 MMHG | HEART RATE: 77 BPM | SYSTOLIC BLOOD PRESSURE: 158 MMHG

## 2024-10-01 DIAGNOSIS — I73.9 PVD (PERIPHERAL VASCULAR DISEASE) WITH CLAUDICATION: Primary | ICD-10-CM

## 2024-10-01 DIAGNOSIS — I25.118 CORONARY ARTERY DISEASE OF NATIVE ARTERY OF NATIVE HEART WITH STABLE ANGINA PECTORIS: ICD-10-CM

## 2024-10-01 PROCEDURE — 1159F MED LIST DOCD IN RCRD: CPT | Performed by: NURSE PRACTITIONER

## 2024-10-01 PROCEDURE — 1160F RVW MEDS BY RX/DR IN RCRD: CPT | Performed by: NURSE PRACTITIONER

## 2024-10-01 PROCEDURE — 93000 ELECTROCARDIOGRAM COMPLETE: CPT | Performed by: NURSE PRACTITIONER

## 2024-10-01 PROCEDURE — 3077F SYST BP >= 140 MM HG: CPT | Performed by: NURSE PRACTITIONER

## 2024-10-01 PROCEDURE — 3079F DIAST BP 80-89 MM HG: CPT | Performed by: NURSE PRACTITIONER

## 2024-10-01 PROCEDURE — 99214 OFFICE O/P EST MOD 30 MIN: CPT | Performed by: NURSE PRACTITIONER

## 2024-10-01 RX ORDER — OMEPRAZOLE 40 MG/1
40 CAPSULE, DELAYED RELEASE ORAL DAILY
COMMUNITY
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
## 2024-10-15 ENCOUNTER — TELEPHONE (OUTPATIENT)
Dept: CARDIOLOGY | Facility: CLINIC | Age: 69
End: 2024-10-15
Payer: MEDICARE

## 2024-10-15 NOTE — TELEPHONE ENCOUNTER
Caller: ZECHARIAH    Relationship:PATIENT    Callback number: [phone]   Is it ok to leave a message: [x] Yes [] No    Requested medication for samples: XARELTO 15 MG SAMPLES    How much medication does the patient currently have left: 5 DAYS    Who will be picking up the samples: PATIENT    Do you need information about patient financial assistance for this medication: [] Yes [] No    Additional details provided: PATIENT WOULD LIKE TO PICK XARELTO 15 MG SAMPLES UP TOMORROW BECAUSE SHE WILL BE IN THE AREA. PLEASE CONTACT PATIENT. THANK YOU.

## 2024-10-23 ENCOUNTER — TELEPHONE (OUTPATIENT)
Dept: CARDIOLOGY | Facility: CLINIC | Age: 69
End: 2024-10-23
Payer: MEDICARE

## 2024-10-23 ENCOUNTER — HOSPITAL ENCOUNTER (OUTPATIENT)
Dept: CARDIOLOGY | Facility: HOSPITAL | Age: 69
Discharge: HOME | End: 2024-10-23
Admitting: NURSE PRACTITIONER
Payer: MEDICARE

## 2024-10-23 DIAGNOSIS — I73.9 PVD (PERIPHERAL VASCULAR DISEASE) WITH CLAUDICATION: ICD-10-CM

## 2024-10-23 LAB
BH CV LOWER ARTERIAL LEFT ABI RATIO: 1.22
BH CV LOWER ARTERIAL LEFT DORSALIS PEDIS SYS MAX: 205
BH CV LOWER ARTERIAL LEFT GREAT TOE SYS MAX: 244
BH CV LOWER ARTERIAL LEFT POST TIBIAL SYS MAX: 201
BH CV LOWER ARTERIAL LEFT TBI RATIO: 1.45
BH CV LOWER ARTERIAL RIGHT ABI RATIO: 1.21
BH CV LOWER ARTERIAL RIGHT DORSALIS PEDIS SYS MAX: 197
BH CV LOWER ARTERIAL RIGHT GREAT TOE SYS MAX: 217
BH CV LOWER ARTERIAL RIGHT POST TIBIAL SYS MAX: 203
BH CV LOWER ARTERIAL RIGHT TBI RATIO: 1.29
UPPER ARTERIAL LEFT ARM BRACHIAL SYS MAX: 155
UPPER ARTERIAL RIGHT ARM BRACHIAL SYS MAX: 168

## 2024-10-23 PROCEDURE — 93923 UPR/LXTR ART STDY 3+ LVLS: CPT

## 2024-10-23 PROCEDURE — 93922 UPR/L XTREMITY ART 2 LEVELS: CPT

## 2024-10-24 NOTE — TELEPHONE ENCOUNTER
Results called to pt.  Instructed to call with any further questions or concerns.  Verbalized understanding.    Laine Peters RN  Triage Nurse, [facility] [place]  10/24/24 11:29 EDT

## 2024-10-28 ENCOUNTER — TELEPHONE (OUTPATIENT)
Age: 69
End: 2024-10-28
Payer: MEDICARE

## 2024-10-28 NOTE — TELEPHONE ENCOUNTER
Caller Name: Domitila Paiz      Relationship: Self      Best Contact Number: [phone]      Patient is requesting samples of rivaroxaban (Xarelto) 15 MG tablet       How many days of medication do you have left? 14 PILLS        Additional Information:  PT IS GOING OUT OF TOWN WED 10.30.2024. DR. FOREMAN NURSE TOLD PT TO CALL HER FOR SAMPLES BEFORE SHE LEFT SO SHE WOULD NOT RUN OUT ON VACATION

## 2024-10-29 RX ORDER — RANOLAZINE 500 MG/1
TABLET, EXTENDED RELEASE ORAL
Qty: 180 TABLET | Refills: 3 | Status: SHIPPED | OUTPATIENT
Start: 2024-10-29

## 2024-11-25 ENCOUNTER — TELEPHONE (OUTPATIENT)
Age: 69
End: 2024-11-25
Payer: MEDICARE

## 2024-11-25 NOTE — TELEPHONE ENCOUNTER
Caller Name: TreDomitila yuong MAYNOR      Relationship: Self      Best Contact Number: [phone]      Patient is requesting samples of: XARELTO 15

## 2024-12-13 ENCOUNTER — HOSPITAL ENCOUNTER (OUTPATIENT)
Facility: HOSPITAL | Age: 69
Discharge: HOME | End: 2024-12-13
Attending: EMERGENCY MEDICINE | Admitting: EMERGENCY MEDICINE
Payer: MEDICARE

## 2024-12-13 ENCOUNTER — APPOINTMENT (OUTPATIENT)
Dept: GENERAL RADIOLOGY | Facility: HOSPITAL | Age: 69
End: 2024-12-13
Payer: MEDICARE

## 2024-12-13 VITALS
TEMPERATURE: 97.2 F | WEIGHT: 120 LBS | SYSTOLIC BLOOD PRESSURE: 150 MMHG | OXYGEN SATURATION: 98 % | HEART RATE: 88 BPM | BODY MASS INDEX: 25.19 KG/M2 | DIASTOLIC BLOOD PRESSURE: 57 MMHG | RESPIRATION RATE: 18 BRPM | HEIGHT: 58 IN

## 2024-12-13 DIAGNOSIS — J01.00 SUBACUTE MAXILLARY SINUSITIS: ICD-10-CM

## 2024-12-13 DIAGNOSIS — R05.2 SUBACUTE COUGH: Primary | ICD-10-CM

## 2024-12-13 PROCEDURE — 87636 SARSCOV2 & INF A&B AMP PRB: CPT | Performed by: EMERGENCY MEDICINE

## 2024-12-13 PROCEDURE — 99214 OFFICE O/P EST MOD 30 MIN: CPT | Performed by: EMERGENCY MEDICINE

## 2024-12-13 PROCEDURE — G0463 HOSPITAL OUTPT CLINIC VISIT: HCPCS | Performed by: EMERGENCY MEDICINE

## 2024-12-13 PROCEDURE — 63710000001 PREDNISONE PER 1 MG: Performed by: EMERGENCY MEDICINE

## 2024-12-13 PROCEDURE — 71046 X-RAY EXAM CHEST 2 VIEWS: CPT

## 2024-12-13 PROCEDURE — 70220 X-RAY EXAM OF SINUSES: CPT

## 2024-12-13 PROCEDURE — 87651 STREP A DNA AMP PROBE: CPT | Performed by: EMERGENCY MEDICINE

## 2024-12-13 RX ORDER — CEFDINIR 300 MG/1
300 CAPSULE ORAL 2 TIMES DAILY
Qty: 14 CAPSULE | Refills: 0 | Status: SHIPPED | OUTPATIENT
Start: 2024-12-13 | End: 2024-12-20

## 2024-12-13 RX ORDER — PREDNISONE 20 MG/1
40 TABLET ORAL DAILY
Qty: 14 TABLET | Refills: 0 | Status: SHIPPED | OUTPATIENT
Start: 2024-12-13 | End: 2024-12-20

## 2024-12-13 RX ORDER — BROMPHENIRAMINE MALEATE, PSEUDOEPHEDRINE HYDROCHLORIDE, AND DEXTROMETHORPHAN HYDROBROMIDE 2; 30; 10 MG/5ML; MG/5ML; MG/5ML
5 SYRUP ORAL 3 TIMES DAILY PRN
Qty: 180 ML | Refills: 0 | Status: SHIPPED | OUTPATIENT
Start: 2024-12-13

## 2024-12-13 RX ORDER — PREDNISONE 20 MG/1
40 TABLET ORAL ONCE
Status: COMPLETED | OUTPATIENT
Start: 2024-12-13 | End: 2024-12-13

## 2024-12-13 RX ADMIN — PREDNISONE 40 MG: 20 TABLET ORAL at 19:31
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
## 2024-12-23 ENCOUNTER — TELEPHONE (OUTPATIENT)
Age: 69
End: 2024-12-23
Payer: MEDICARE

## 2024-12-23 NOTE — TELEPHONE ENCOUNTER
Caller Name: Domitila Paiz MAYNOR      Relationship: Self      Best Contact Number: [phone]  Patient is requesting samples of: XARELTO 15MG      How many days of medication do you have left?

## 2024-12-30 ENCOUNTER — OFFICE VISIT (OUTPATIENT)
Age: 69
End: 2024-12-30
Payer: MEDICARE

## 2024-12-30 VITALS
SYSTOLIC BLOOD PRESSURE: 170 MMHG | DIASTOLIC BLOOD PRESSURE: 84 MMHG | BODY MASS INDEX: 26.87 KG/M2 | HEART RATE: 82 BPM | WEIGHT: 128 LBS | HEIGHT: 58 IN

## 2024-12-30 DIAGNOSIS — I73.9 PVD (PERIPHERAL VASCULAR DISEASE) WITH CLAUDICATION: ICD-10-CM

## 2024-12-30 DIAGNOSIS — I10 HYPERTENSION, UNSPECIFIED TYPE: ICD-10-CM

## 2024-12-30 DIAGNOSIS — E78.2 MIXED HYPERLIPIDEMIA: ICD-10-CM

## 2024-12-30 DIAGNOSIS — I48.0 PAROXYSMAL ATRIAL FIBRILLATION: ICD-10-CM

## 2024-12-30 DIAGNOSIS — I25.118 CORONARY ARTERY DISEASE OF NATIVE ARTERY OF NATIVE HEART WITH STABLE ANGINA PECTORIS: Primary | ICD-10-CM

## 2024-12-30 PROCEDURE — 1159F MED LIST DOCD IN RCRD: CPT | Performed by: INTERNAL MEDICINE

## 2024-12-30 PROCEDURE — 1160F RVW MEDS BY RX/DR IN RCRD: CPT | Performed by: INTERNAL MEDICINE

## 2024-12-30 PROCEDURE — 3077F SYST BP >= 140 MM HG: CPT | Performed by: INTERNAL MEDICINE

## 2024-12-30 PROCEDURE — 93000 ELECTROCARDIOGRAM COMPLETE: CPT | Performed by: INTERNAL MEDICINE

## 2024-12-30 PROCEDURE — 3079F DIAST BP 80-89 MM HG: CPT | Performed by: INTERNAL MEDICINE

## 2024-12-30 PROCEDURE — 99214 OFFICE O/P EST MOD 30 MIN: CPT | Performed by: INTERNAL MEDICINE

## 2024-12-30 RX ORDER — HYDRALAZINE HYDROCHLORIDE 25 MG/1
25 TABLET, FILM COATED ORAL 3 TIMES DAILY
Qty: 90 TABLET | Refills: 3 | Status: SHIPPED | OUTPATIENT
Start: 2024-12-30
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
## 2025-01-02 ENCOUNTER — TELEPHONE (OUTPATIENT)
Age: 70
End: 2025-01-02
Payer: MEDICARE

## 2025-01-02 NOTE — TELEPHONE ENCOUNTER
Received request for clearance to hold Xarelto prior to EGD which is planned for 4/15.  From a cardiac standpoint patient is well compensated and doing well.  She is on Xarelto for history of atrial fibrillation may hold her Xarelto 2 days prior to her procedure

## 2025-01-08 RX ORDER — RIVAROXABAN 15 MG/1
15 TABLET, FILM COATED ORAL DAILY
Qty: 90 TABLET | Refills: 0 | Status: SHIPPED | OUTPATIENT
Start: 2025-01-08

## 2025-01-09 ENCOUNTER — TRANSCRIBE ORDERS (OUTPATIENT)
Dept: ADMINISTRATIVE | Facility: HOSPITAL | Age: 70
End: 2025-01-09
Payer: MEDICARE

## 2025-01-09 DIAGNOSIS — Z78.0 MENOPAUSE: Primary | ICD-10-CM

## 2025-02-07 ENCOUNTER — TELEPHONE (OUTPATIENT)
Age: 70
End: 2025-02-07
Payer: MEDICARE

## 2025-02-07 NOTE — TELEPHONE ENCOUNTER
Spoke with patient and with Joel. Samples are available for her on the 4th floor and pt is aware.    Thank you,    Zeynep Paris, RN  [place] [facility]  02/07/25 11:09 EST

## 2025-02-07 NOTE — TELEPHONE ENCOUNTER
Caller Name: Domitila Paiz MAYNOR  Relationship: Self  Best Contact Number: [phone]    Patient is requesting samples of XARELTO 15 MG  How many days of medication do you have left? 2 PILLS     Additional Information:

## 2025-02-24 ENCOUNTER — TELEPHONE (OUTPATIENT)
Age: 70
End: 2025-02-24
Payer: MEDICARE

## 2025-02-24 NOTE — TELEPHONE ENCOUNTER
Caller:ZECHARIAH ERICKSON    Relationship:SELF    Callback number: [phone]   Is it ok to leave a message: [x] Yes [] No    Requested medication for samples: XARELTO    How much medication does the patient currently have left: ONE DAY    Who will be picking up the samples: SELF    Do you need information about patient financial assistance for this medication: [] Yes [x] No    Additional details provided: [address]

## 2025-02-28 ENCOUNTER — TRANSCRIBE ORDERS (OUTPATIENT)
Dept: ADMINISTRATIVE | Facility: HOSPITAL | Age: 70
End: 2025-02-28
Payer: MEDICARE

## 2025-02-28 DIAGNOSIS — Z12.31 BREAST CANCER SCREENING BY MAMMOGRAM: Primary | ICD-10-CM

## 2025-03-05 ENCOUNTER — TELEPHONE (OUTPATIENT)
Age: 70
End: 2025-03-05
Payer: MEDICARE

## 2025-03-05 NOTE — TELEPHONE ENCOUNTER
Received paperwork from [facility]. Patient is scheduled to have an EGD on 04/15/25 w/.They are needing surgical clearance and guidance on holding her Xarelto. She is also taking 81 mg aspirin. Please advise.     Their office can be reached at [phone]. Fax number [phone]

## 2025-03-10 ENCOUNTER — TELEPHONE (OUTPATIENT)
Age: 70
End: 2025-03-10
Payer: MEDICARE

## 2025-03-10 NOTE — TELEPHONE ENCOUNTER
Caller: FREDERICK APPLE    Callback number: [phone]   Is it ok to leave a message: [x] Yes [] No    Requested medication for samples:XARELTO 15 MG    How much medication does the patient currently have left: 1 DAY    Who will be picking up the samples: PT    Do you need information about patient financial assistance for this medication: [] Yes [x] No    Additional details provided: PLEASE CALL PT TO  SAMPLES

## 2025-03-25 ENCOUNTER — APPOINTMENT (OUTPATIENT)
Dept: BONE DENSITY | Facility: HOSPITAL | Age: 70
End: 2025-03-25
Payer: MEDICARE

## 2025-03-25 ENCOUNTER — HOSPITAL ENCOUNTER (OUTPATIENT)
Dept: MAMMOGRAPHY | Facility: HOSPITAL | Age: 70
Discharge: HOME | End: 2025-03-25
Admitting: NURSE PRACTITIONER
Payer: MEDICARE

## 2025-03-25 DIAGNOSIS — Z12.31 BREAST CANCER SCREENING BY MAMMOGRAM: ICD-10-CM

## 2025-03-25 PROCEDURE — 77063 BREAST TOMOSYNTHESIS BI: CPT

## 2025-03-25 PROCEDURE — 77067 SCR MAMMO BI INCL CAD: CPT

## 2025-03-26 ENCOUNTER — HOSPITAL ENCOUNTER (OUTPATIENT)
Dept: BONE DENSITY | Facility: HOSPITAL | Age: 70
Discharge: HOME | End: 2025-03-26
Admitting: NURSE PRACTITIONER
Payer: MEDICARE

## 2025-03-26 DIAGNOSIS — Z78.0 MENOPAUSE: ICD-10-CM

## 2025-03-26 PROCEDURE — 77080 DXA BONE DENSITY AXIAL: CPT

## 2025-03-31 ENCOUNTER — TELEPHONE (OUTPATIENT)
Age: 70
End: 2025-03-31
Payer: MEDICARE

## 2025-03-31 NOTE — TELEPHONE ENCOUNTER
Caller: Domitila Paiz    Relationship: Self    Best call back number: [phone]      PATIENT IS NEEDING SAMPLES OF XARELTO 15MG FOR  AT APPT 4.1.25

## 2025-04-01 ENCOUNTER — OFFICE VISIT (OUTPATIENT)
Dept: CARDIOLOGY | Age: 70
End: 2025-04-01
Payer: MEDICARE

## 2025-04-01 VITALS
DIASTOLIC BLOOD PRESSURE: 78 MMHG | BODY MASS INDEX: 26.75 KG/M2 | HEART RATE: 78 BPM | HEIGHT: 58 IN | SYSTOLIC BLOOD PRESSURE: 128 MMHG

## 2025-04-01 DIAGNOSIS — I21.4 NSTEMI (NON-ST ELEVATED MYOCARDIAL INFARCTION): ICD-10-CM

## 2025-04-01 DIAGNOSIS — I48.0 PAROXYSMAL ATRIAL FIBRILLATION: ICD-10-CM

## 2025-04-01 DIAGNOSIS — I24.9 ACS (ACUTE CORONARY SYNDROME): ICD-10-CM

## 2025-04-01 DIAGNOSIS — R07.2 PRECORDIAL PAIN: ICD-10-CM

## 2025-04-01 DIAGNOSIS — Z95.5 PRESENCE OF STENT IN CORONARY ARTERY: Primary | ICD-10-CM

## 2025-04-01 DIAGNOSIS — I10 HYPERTENSION, UNSPECIFIED TYPE: ICD-10-CM

## 2025-04-01 DIAGNOSIS — E78.2 MIXED HYPERLIPIDEMIA: ICD-10-CM

## 2025-04-01 PROCEDURE — 99214 OFFICE O/P EST MOD 30 MIN: CPT | Performed by: NURSE PRACTITIONER

## 2025-04-01 PROCEDURE — 1160F RVW MEDS BY RX/DR IN RCRD: CPT | Performed by: NURSE PRACTITIONER

## 2025-04-01 PROCEDURE — 3078F DIAST BP <80 MM HG: CPT | Performed by: NURSE PRACTITIONER

## 2025-04-01 PROCEDURE — 1159F MED LIST DOCD IN RCRD: CPT | Performed by: NURSE PRACTITIONER

## 2025-04-01 PROCEDURE — 3074F SYST BP LT 130 MM HG: CPT | Performed by: NURSE PRACTITIONER

## 2025-04-01 PROCEDURE — 93000 ELECTROCARDIOGRAM COMPLETE: CPT | Performed by: NURSE PRACTITIONER

## 2025-04-01 RX ORDER — RANOLAZINE 1000 MG/1
1000 TABLET, EXTENDED RELEASE ORAL 2 TIMES DAILY
Start: 2025-04-01
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
## 2025-04-02 DIAGNOSIS — R07.2 PRECORDIAL PAIN: Primary | ICD-10-CM

## 2025-04-02 DIAGNOSIS — I25.10 CORONARY ARTERY DISEASE INVOLVING NATIVE CORONARY ARTERY OF NATIVE HEART WITHOUT ANGINA PECTORIS: ICD-10-CM

## 2025-04-04 ENCOUNTER — TELEPHONE (OUTPATIENT)
Age: 70
End: 2025-04-04

## 2025-04-04 NOTE — TELEPHONE ENCOUNTER
Caller: Domitila Paiz    Relationship: Self    Best call back number: [phone]     What was the call regarding: PT IS CURRENTLY SCHEDULED FOR ECHO ON 04.09.25 BUT WHEN RECEIVING A CALL ABOUT SCHEDULING STRESS TEST, SHE WAS ADVISED THE ECHO WAS NOT NEEDED - PT STATES SHE IS SCHEDULED AND NO NOTES SHOWING IT IS UNNEEDED - SHE WANTS TO CONFIRM JUST SO SHE ISN'T DOING IT FOR NO REASON -

## 2025-04-09 ENCOUNTER — HOSPITAL ENCOUNTER (OUTPATIENT)
Dept: CARDIOLOGY | Facility: HOSPITAL | Age: 70
Discharge: HOME | End: 2025-04-09
Admitting: NURSE PRACTITIONER
Payer: MEDICARE

## 2025-04-09 VITALS
BODY MASS INDEX: 26.87 KG/M2 | HEIGHT: 58 IN | DIASTOLIC BLOOD PRESSURE: 80 MMHG | SYSTOLIC BLOOD PRESSURE: 160 MMHG | HEART RATE: 70 BPM | WEIGHT: 128 LBS

## 2025-04-09 DIAGNOSIS — R07.2 PRECORDIAL PAIN: ICD-10-CM

## 2025-04-09 DIAGNOSIS — I10 HYPERTENSION, UNSPECIFIED TYPE: ICD-10-CM

## 2025-04-09 LAB
AORTIC ARCH: 1.8 CM
AORTIC DIMENSIONLESS INDEX: 0.72 (DI)
ASCENDING AORTA: 3.5 CM
AV MEAN PRESS GRAD SYS DOP V1V2: 4 MMHG
AV VMAX SYS DOP: 140 CM/SEC
BH CV ECHO MEAS - ACS: 2 CM
BH CV ECHO MEAS - AO MAX PG: 7.8 MMHG
BH CV ECHO MEAS - AO ROOT DIAM: 3.3 CM
BH CV ECHO MEAS - AO V2 VTI: 25.6 CM
BH CV ECHO MEAS - AVA(I,D): 2.15 CM2
BH CV ECHO MEAS - EDV(CUBED): 97.3 ML
BH CV ECHO MEAS - EDV(MOD-SP2): 55 ML
BH CV ECHO MEAS - EDV(MOD-SP4): 49 ML
BH CV ECHO MEAS - EF(MOD-SP2): 52.7 %
BH CV ECHO MEAS - EF(MOD-SP4): 57.1 %
BH CV ECHO MEAS - ESV(CUBED): 25.9 ML
BH CV ECHO MEAS - ESV(MOD-SP2): 26 ML
BH CV ECHO MEAS - ESV(MOD-SP4): 21 ML
BH CV ECHO MEAS - FS: 35.7 %
BH CV ECHO MEAS - IVS/LVPW: 1 CM
BH CV ECHO MEAS - IVSD: 1.1 CM
BH CV ECHO MEAS - LAT PEAK E' VEL: 7.2 CM/SEC
BH CV ECHO MEAS - LV DIASTOLIC VOL/BSA (35-75): 32.5 CM2
BH CV ECHO MEAS - LV MASS(C)D: 181.2 GRAMS
BH CV ECHO MEAS - LV MAX PG: 3.7 MMHG
BH CV ECHO MEAS - LV MEAN PG: 2 MMHG
BH CV ECHO MEAS - LV SYSTOLIC VOL/BSA (12-30): 13.9 CM2
BH CV ECHO MEAS - LV V1 MAX: 96.4 CM/SEC
BH CV ECHO MEAS - LV V1 VTI: 18.4 CM
BH CV ECHO MEAS - LVIDD: 4.6 CM
BH CV ECHO MEAS - LVIDS: 3 CM
BH CV ECHO MEAS - LVOT AREA: 3 CM2
BH CV ECHO MEAS - LVOT DIAM: 1.95 CM
BH CV ECHO MEAS - LVPWD: 1.1 CM
BH CV ECHO MEAS - MED PEAK E' VEL: 7.5 CM/SEC
BH CV ECHO MEAS - MR MAX PG: 64.6 MMHG
BH CV ECHO MEAS - MR MAX VEL: 401.8 CM/SEC
BH CV ECHO MEAS - MV A DUR: 0.11 SEC
BH CV ECHO MEAS - MV A MAX VEL: 102 CM/SEC
BH CV ECHO MEAS - MV DEC SLOPE: 414.3 CM/SEC2
BH CV ECHO MEAS - MV DEC TIME: 0.17 SEC
BH CV ECHO MEAS - MV E MAX VEL: 64.3 CM/SEC
BH CV ECHO MEAS - MV E/A: 0.63
BH CV ECHO MEAS - MV MAX PG: 5.9 MMHG
BH CV ECHO MEAS - MV MEAN PG: 2.16 MMHG
BH CV ECHO MEAS - MV P1/2T: 49 MSEC
BH CV ECHO MEAS - MV V2 VTI: 19.9 CM
BH CV ECHO MEAS - MVA(P1/2T): 4.5 CM2
BH CV ECHO MEAS - MVA(VTI): 2.8 CM2
BH CV ECHO MEAS - PA ACC TIME: 0.11 SEC
BH CV ECHO MEAS - PA V2 MAX: 79.3 CM/SEC
BH CV ECHO MEAS - PULM A REVS DUR: 0.11 SEC
BH CV ECHO MEAS - PULM A REVS VEL: 34.7 CM/SEC
BH CV ECHO MEAS - PULM DIAS VEL: 33.8 CM/SEC
BH CV ECHO MEAS - PULM S/D: 1.76
BH CV ECHO MEAS - PULM SYS VEL: 59.6 CM/SEC
BH CV ECHO MEAS - QP/QS: 0.65
BH CV ECHO MEAS - RAP SYSTOLE: 3 MMHG
BH CV ECHO MEAS - RV MAX PG: 2.2 MMHG
BH CV ECHO MEAS - RV V1 MAX: 74.1 CM/SEC
BH CV ECHO MEAS - RV V1 VTI: 13.8 CM
BH CV ECHO MEAS - RVOT DIAM: 1.82 CM
BH CV ECHO MEAS - RVSP: 21 MMHG
BH CV ECHO MEAS - SUP REN AO DIAM: 2.1 CM
BH CV ECHO MEAS - SV(LVOT): 55.1 ML
BH CV ECHO MEAS - SV(MOD-SP2): 29 ML
BH CV ECHO MEAS - SV(MOD-SP4): 28 ML
BH CV ECHO MEAS - SV(RVOT): 35.6 ML
BH CV ECHO MEAS - SVI(LVOT): 36.5 ML/M2
BH CV ECHO MEAS - SVI(MOD-SP2): 19.2 ML/M2
BH CV ECHO MEAS - SVI(MOD-SP4): 18.6 ML/M2
BH CV ECHO MEAS - TAPSE (>1.6): 2.04 CM
BH CV ECHO MEAS - TR MAX PG: 17.7 MMHG
BH CV ECHO MEAS - TR MAX VEL: 210.2 CM/SEC
BH CV ECHO MEASUREMENTS AVERAGE E/E' RATIO: 8.75
BH CV XLRA - RV BASE: 2.6 CM
BH CV XLRA - RV LENGTH: 6.6 CM
BH CV XLRA - RV MID: 1.82 CM
BH CV XLRA - TDI S': 14.7 CM/SEC
LEFT ATRIUM VOLUME INDEX: 31.1 ML/M2
LV EF BIPLANE MOD: 56.6 %
SINUS: 3.4 CM
STJ: 2.8 CM

## 2025-04-09 PROCEDURE — 93306 TTE W/DOPPLER COMPLETE: CPT

## 2025-04-10 ENCOUNTER — RESULTS FOLLOW-UP (OUTPATIENT)
Dept: CARDIOLOGY | Age: 70
End: 2025-04-10
Payer: MEDICARE

## 2025-04-18 ENCOUNTER — TELEPHONE (OUTPATIENT)
Dept: CARDIOLOGY | Age: 70
End: 2025-04-18
Payer: MEDICARE

## 2025-04-21 ENCOUNTER — HOSPITAL ENCOUNTER (OUTPATIENT)
Dept: CARDIOLOGY | Facility: HOSPITAL | Age: 70
Discharge: HOME | End: 2025-04-21
Admitting: NURSE PRACTITIONER
Payer: MEDICARE

## 2025-04-21 VITALS — WEIGHT: 128.09 LBS | HEIGHT: 58 IN | BODY MASS INDEX: 26.89 KG/M2

## 2025-04-21 DIAGNOSIS — R07.2 PRECORDIAL PAIN: ICD-10-CM

## 2025-04-21 DIAGNOSIS — I25.10 CORONARY ARTERY DISEASE INVOLVING NATIVE CORONARY ARTERY OF NATIVE HEART WITHOUT ANGINA PECTORIS: ICD-10-CM

## 2025-04-21 LAB
BH CV NUCLEAR PRIOR STUDY: 2
BH CV REST NUCLEAR ISOTOPE DOSE: 15.3 MCI
BH CV STRESS BP STAGE 1: NORMAL
BH CV STRESS COMMENTS STAGE 1: NORMAL
BH CV STRESS DOSE REGADENOSON STAGE 1: 0.4
BH CV STRESS DURATION MIN STAGE 1: 0
BH CV STRESS DURATION SEC STAGE 1: 10
BH CV STRESS HR STAGE 1: 115
BH CV STRESS NUCLEAR ISOTOPE DOSE: 15.3 MCI
BH CV STRESS O2 STAGE 1: 99
BH CV STRESS PROTOCOL 1: NORMAL
BH CV STRESS RECOVERY BP: NORMAL MMHG
BH CV STRESS RECOVERY HR: 93 BPM
BH CV STRESS RECOVERY O2: 98 %
BH CV STRESS STAGE 1: 1
MAXIMAL PREDICTED HEART RATE: 151 BPM
PERCENT MAX PREDICTED HR: 76.16 %
SPECT HRT GATED+EF W RNC IV: 60 %
STRESS BASELINE BP: NORMAL MMHG
STRESS BASELINE HR: 75 BPM
STRESS O2 SAT REST: 95 %
STRESS PERCENT HR: 90 %
STRESS POST EXERCISE DUR SEC: 10 SEC
STRESS POST O2 SAT PEAK: 99 %
STRESS POST PEAK BP: NORMAL MMHG
STRESS POST PEAK HR: 115 BPM
STRESS TARGET HR: 128 BPM

## 2025-04-21 PROCEDURE — 93016 CV STRESS TEST SUPVJ ONLY: CPT | Performed by: INTERNAL MEDICINE

## 2025-04-21 PROCEDURE — 25010000002 REGADENOSON 0.4 MG/5ML SOLUTION: Performed by: NURSE PRACTITIONER

## 2025-04-21 PROCEDURE — 78492 MYOCRD IMG PET MLT RST&STRS: CPT | Performed by: INTERNAL MEDICINE

## 2025-04-21 PROCEDURE — 25010000002 AMINOPHYLLINE PER 250 MG: Performed by: NURSE PRACTITIONER

## 2025-04-21 PROCEDURE — 93018 CV STRESS TEST I&R ONLY: CPT | Performed by: INTERNAL MEDICINE

## 2025-04-21 PROCEDURE — 93017 CV STRESS TEST TRACING ONLY: CPT

## 2025-04-21 PROCEDURE — 34310000005 RUBIDIUM CHLORIDE: Performed by: NURSE PRACTITIONER

## 2025-04-21 PROCEDURE — A9555 RB82 RUBIDIUM: HCPCS | Performed by: NURSE PRACTITIONER

## 2025-04-21 PROCEDURE — 78492 MYOCRD IMG PET MLT RST&STRS: CPT

## 2025-04-21 RX ORDER — REGADENOSON 0.08 MG/ML
0.4 INJECTION, SOLUTION INTRAVENOUS
Status: COMPLETED | OUTPATIENT
Start: 2025-04-21 | End: 2025-04-21

## 2025-04-21 RX ORDER — AMINOPHYLLINE 25 MG/ML
125 INJECTION, SOLUTION INTRAVENOUS ONCE
Status: COMPLETED | OUTPATIENT
Start: 2025-04-21 | End: 2025-04-21

## 2025-04-21 RX ADMIN — REGADENOSON 0.4 MG: 0.08 INJECTION, SOLUTION INTRAVENOUS at 07:45

## 2025-04-21 RX ADMIN — AMINOPHYLLINE 125 MG: 25 INJECTION, SOLUTION INTRAVENOUS at 08:05

## 2025-04-28 ENCOUNTER — OFFICE VISIT (OUTPATIENT)
Dept: OBSTETRICS AND GYNECOLOGY | Facility: CLINIC | Age: 70
End: 2025-04-28
Payer: MEDICARE

## 2025-04-28 VITALS — SYSTOLIC BLOOD PRESSURE: 134 MMHG | WEIGHT: 126 LBS | BODY MASS INDEX: 26.34 KG/M2 | DIASTOLIC BLOOD PRESSURE: 73 MMHG

## 2025-04-28 DIAGNOSIS — Z01.419 ENCOUNTER FOR WELL WOMAN EXAM: Primary | ICD-10-CM

## 2025-04-28 DIAGNOSIS — Z78.0 POSTMENOPAUSAL STATUS: ICD-10-CM

## 2025-04-28 DIAGNOSIS — N81.9 VAGINAL VAULT PROLAPSE: ICD-10-CM

## 2025-04-28 DIAGNOSIS — Z78.0 OSTEOPENIA AFTER MENOPAUSE: ICD-10-CM

## 2025-04-28 DIAGNOSIS — M85.80 OSTEOPENIA AFTER MENOPAUSE: ICD-10-CM

## 2025-04-28 RX ORDER — ALENDRONATE SODIUM 70 MG/1
70 TABLET ORAL
Qty: 12 TABLET | Refills: 3 | Status: SHIPPED | OUTPATIENT
Start: 2025-04-28 | End: 2025-07-27
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
## 2025-05-01 RX ORDER — HYDRALAZINE HYDROCHLORIDE 25 MG/1
25 TABLET, FILM COATED ORAL 3 TIMES DAILY
Qty: 90 TABLET | Refills: 3 | Status: SHIPPED | OUTPATIENT
Start: 2025-05-01

## 2025-05-07 ENCOUNTER — OFFICE VISIT (OUTPATIENT)
Dept: OBSTETRICS AND GYNECOLOGY | Facility: CLINIC | Age: 70
End: 2025-05-07
Payer: MEDICARE

## 2025-05-07 VITALS — DIASTOLIC BLOOD PRESSURE: 84 MMHG | WEIGHT: 126 LBS | SYSTOLIC BLOOD PRESSURE: 168 MMHG | BODY MASS INDEX: 26.34 KG/M2

## 2025-05-07 DIAGNOSIS — Z46.89 ENCOUNTER FOR FITTING AND ADJUSTMENT OF PESSARY: Primary | ICD-10-CM

## 2025-05-07 NOTE — PROGRESS NOTES
Pessary insertion:    She presents for pessary insertion for management of incomplete uterovaginal prolapse noted on previous exam last week.  Tried size 1 ring with support but had difficulty getting it inserted. Tried size 2 ring with support but once patient had in place and lead to lower abdominal pressure with movement. Finally, tried Donut size 2 in pessary by [employer].   Pessary inserted without difficulty.     She had no discomfort with the pessary in place. She ambulated and strained multiple times during the visit without the pessary getting dislodged or causing any discomfort.    Instructed in pessary care. Will have the patient return tomorrow afternoon to see how she does with this and then we can order her one. Given precautions to return with pain, bleeding, inability to urinate, or other concerning sign/symptom.   Patient expressed understanding.    Karen Tompkins MD

## 2025-05-08 ENCOUNTER — OFFICE VISIT (OUTPATIENT)
Dept: OBSTETRICS AND GYNECOLOGY | Facility: CLINIC | Age: 70
End: 2025-05-08
Payer: MEDICARE

## 2025-05-08 VITALS — BODY MASS INDEX: 26.34 KG/M2 | DIASTOLIC BLOOD PRESSURE: 83 MMHG | WEIGHT: 126 LBS | SYSTOLIC BLOOD PRESSURE: 187 MMHG

## 2025-05-08 DIAGNOSIS — T83.9XXD PROBLEM WITH VAGINAL PESSARY, SUBSEQUENT ENCOUNTER: ICD-10-CM

## 2025-05-08 DIAGNOSIS — N99.3 INCOMPLETE PROLAPSE OF VAGINAL VAULT: Primary | ICD-10-CM

## 2025-05-08 DIAGNOSIS — N39.41 URGE INCONTINENCE: ICD-10-CM

## 2025-05-08 RX ORDER — VIBEGRON 75 MG/1
75 TABLET, FILM COATED ORAL DAILY
Qty: 90 TABLET | Refills: 3 | Status: SHIPPED | OUTPATIENT
Start: 2025-05-08
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
## 2025-05-09 ENCOUNTER — TELEPHONE (OUTPATIENT)
Age: 70
End: 2025-05-09
Payer: MEDICARE

## 2025-05-09 NOTE — TELEPHONE ENCOUNTER
Patient is on Xarelto for paroxysmal atrial fibrillation.  She may hold her Xarelto 3 days prior as requested.  And restart once today from a postoperative bleeding perspective.  If you could call her and let her know that would be great.  Thank you

## 2025-05-09 NOTE — TELEPHONE ENCOUNTER
Patient is having surgery Monday 05/12/25. She is having a Cyst removed from her foot. They wanted her to stop the Xarelto 3 days prior. She wanted to double check with you to make sure this was right. They did not mention the Aspirin. Please advise.     Pt can be reached at [phone]. She stated that if she does not , you can leave the information on her voicemail.

## 2025-05-29 ENCOUNTER — TELEPHONE (OUTPATIENT)
Age: 70
End: 2025-05-29
Payer: MEDICARE

## 2025-05-29 NOTE — TELEPHONE ENCOUNTER
"  Caller: Domitila Paiz \"Marian\"    Relationship: Self    Best call back number:       PATIENT IS REQUESTING SAMPLES OF XARELTO 15MG    SHE HAS ABOUT 6 DAYS LEFT  "

## 2025-06-09 ENCOUNTER — APPOINTMENT (OUTPATIENT)
Dept: CT IMAGING | Facility: HOSPITAL | Age: 70
End: 2025-06-09
Payer: MEDICARE

## 2025-06-09 ENCOUNTER — APPOINTMENT (OUTPATIENT)
Dept: GENERAL RADIOLOGY | Facility: HOSPITAL | Age: 70
End: 2025-06-09
Payer: MEDICARE

## 2025-06-09 ENCOUNTER — HOSPITAL ENCOUNTER (EMERGENCY)
Facility: HOSPITAL | Age: 70
Discharge: HOME | End: 2025-06-09
Attending: EMERGENCY MEDICINE | Admitting: EMERGENCY MEDICINE
Payer: MEDICARE

## 2025-06-09 VITALS
OXYGEN SATURATION: 99 % | HEIGHT: 58 IN | WEIGHT: 122 LBS | RESPIRATION RATE: 16 BRPM | TEMPERATURE: 97.6 F | SYSTOLIC BLOOD PRESSURE: 174 MMHG | HEART RATE: 79 BPM | DIASTOLIC BLOOD PRESSURE: 87 MMHG | BODY MASS INDEX: 25.61 KG/M2

## 2025-06-09 DIAGNOSIS — S70.02XA CONTUSION OF LEFT HIP, INITIAL ENCOUNTER: ICD-10-CM
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
## 2025-06-09 DIAGNOSIS — S20.212A CONTUSION OF LEFT CHEST WALL, INITIAL ENCOUNTER: ICD-10-CM

## 2025-06-09 DIAGNOSIS — S16.1XXA STRAIN OF NECK MUSCLE, INITIAL ENCOUNTER: ICD-10-CM

## 2025-06-09 DIAGNOSIS — S40.012A CONTUSION OF LEFT SHOULDER, INITIAL ENCOUNTER: ICD-10-CM

## 2025-06-09 DIAGNOSIS — S09.90XA INJURY OF HEAD, INITIAL ENCOUNTER: ICD-10-CM

## 2025-06-09 PROCEDURE — 99285 EMERGENCY DEPT VISIT HI MDM: CPT

## 2025-06-09 PROCEDURE — 25510000001 IOPAMIDOL PER 1 ML: Performed by: EMERGENCY MEDICINE

## 2025-06-09 PROCEDURE — 73030 X-RAY EXAM OF SHOULDER: CPT

## 2025-06-09 PROCEDURE — 72125 CT NECK SPINE W/O DYE: CPT

## 2025-06-09 PROCEDURE — 73502 X-RAY EXAM HIP UNI 2-3 VIEWS: CPT

## 2025-06-09 PROCEDURE — 74177 CT ABD & PELVIS W/CONTRAST: CPT

## 2025-06-09 PROCEDURE — 71260 CT THORAX DX C+: CPT

## 2025-06-09 PROCEDURE — 70450 CT HEAD/BRAIN W/O DYE: CPT

## 2025-06-09 PROCEDURE — 76377 3D RENDER W/INTRP POSTPROCES: CPT

## 2025-06-09 RX ORDER — LIDOCAINE 50 MG/G
1 PATCH TOPICAL EVERY 24 HOURS
Qty: 14 PATCH | Refills: 0 | Status: SHIPPED | OUTPATIENT
Start: 2025-06-09

## 2025-06-09 RX ORDER — HYDROCODONE BITARTRATE AND ACETAMINOPHEN 5; 325 MG/1; MG/1
1 TABLET ORAL ONCE
Refills: 0 | Status: DISCONTINUED | OUTPATIENT
Start: 2025-06-09 | End: 2025-06-09 | Stop reason: HOSPADM

## 2025-06-09 RX ORDER — HYDROCODONE BITARTRATE AND ACETAMINOPHEN 5; 325 MG/1; MG/1
1 TABLET ORAL ONCE
Refills: 0 | Status: COMPLETED | OUTPATIENT
Start: 2025-06-09 | End: 2025-06-09

## 2025-06-09 RX ORDER — IOPAMIDOL 755 MG/ML
100 INJECTION, SOLUTION INTRAVASCULAR
Status: COMPLETED | OUTPATIENT
Start: 2025-06-09 | End: 2025-06-09

## 2025-06-09 RX ORDER — HYDROCODONE BITARTRATE AND ACETAMINOPHEN 5; 325 MG/1; MG/1
1 TABLET ORAL ONCE AS NEEDED
Refills: 0 | Status: DISCONTINUED | OUTPATIENT
Start: 2025-06-09 | End: 2025-06-09 | Stop reason: HOSPADM

## 2025-06-09 RX ORDER — METHOCARBAMOL 750 MG/1
750 TABLET, FILM COATED ORAL 3 TIMES DAILY
Qty: 21 TABLET | Refills: 0 | Status: SHIPPED | OUTPATIENT
Start: 2025-06-09

## 2025-06-09 RX ADMIN — HYDROCODONE BITARTRATE AND ACETAMINOPHEN 1 TABLET: 5; 325 TABLET ORAL at 14:09

## 2025-06-09 RX ADMIN — HYDROCODONE BITARTRATE AND ACETAMINOPHEN 1 TABLET: 5; 325 TABLET ORAL at 16:36

## 2025-06-09 RX ADMIN — IOPAMIDOL 85 ML: 755 INJECTION, SOLUTION INTRAVENOUS at 15:10

## 2025-06-09 NOTE — DISCHARGE INSTRUCTIONS
Ice as much as tolerated for the next 3 to 5 days.  This will help significantly with the swelling and pain.  Do not put directly on the skin.    Rest and avoid those movements that aggravate your symptoms.  Follow-up closely with primary care for repeat evaluation in a week's time.  If you continue to have shoulder and hip pain follow-up with the orthopedist above.
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
## 2025-07-09 ENCOUNTER — APPOINTMENT (OUTPATIENT)
Dept: CT IMAGING | Facility: HOSPITAL | Age: 70
DRG: 392 | End: 2025-07-09
Payer: MEDICARE

## 2025-07-09 ENCOUNTER — HOSPITAL ENCOUNTER (INPATIENT)
Facility: HOSPITAL | Age: 70
LOS: 2 days | Discharge: HOME | DRG: 392 | End: 2025-07-12
Attending: EMERGENCY MEDICINE | Admitting: INTERNAL MEDICINE
Payer: MEDICARE

## 2025-07-09 DIAGNOSIS — K57.32 SIGMOID DIVERTICULITIS: ICD-10-CM

## 2025-07-09 DIAGNOSIS — N39.41 URGE INCONTINENCE: ICD-10-CM

## 2025-07-09 LAB
ALBUMIN SERPL-MCNC: 4.3 G/DL (ref 3.5–5.2)
ALBUMIN/GLOB SERPL: 1.2 G/DL
ALP SERPL-CCNC: 118 U/L (ref 39–117)
ALT SERPL W P-5'-P-CCNC: 20 U/L (ref 1–33)
ANION GAP SERPL CALCULATED.3IONS-SCNC: 11.3 MMOL/L (ref 5–15)
AST SERPL-CCNC: 21 U/L (ref 1–32)
BACTERIA UR QL AUTO: ABNORMAL /HPF
BASOPHILS # BLD AUTO: 0.05 10*3/MM3 (ref 0–0.2)
BASOPHILS NFR BLD AUTO: 0.3 % (ref 0–1.5)
BILIRUB SERPL-MCNC: 0.4 MG/DL (ref 0–1.2)
BILIRUB UR QL STRIP: NEGATIVE
BUN SERPL-MCNC: 10 MG/DL (ref 8–23)
BUN/CREAT SERPL: 12.3 (ref 7–25)
CALCIUM SPEC-SCNC: 9.4 MG/DL (ref 8.6–10.5)
CHLORIDE SERPL-SCNC: 101 MMOL/L (ref 98–107)
CLARITY UR: CLEAR
CO2 SERPL-SCNC: 24.7 MMOL/L (ref 22–29)
COLOR UR: YELLOW
CREAT SERPL-MCNC: 0.81 MG/DL (ref 0.57–1)
D-LACTATE SERPL-SCNC: 1.1 MMOL/L (ref 0.5–2)
DEPRECATED RDW RBC AUTO: 44.6 FL (ref 37–54)
EGFRCR SERPLBLD CKD-EPI 2021: 78.7 ML/MIN/1.73
EOSINOPHIL # BLD AUTO: 0.08 10*3/MM3 (ref 0–0.4)
EOSINOPHIL NFR BLD AUTO: 0.6 % (ref 0.3–6.2)
ERYTHROCYTE [DISTWIDTH] IN BLOOD BY AUTOMATED COUNT: 16.3 % (ref 12.3–15.4)
GLOBULIN UR ELPH-MCNC: 3.5 GM/DL
GLUCOSE BLDC GLUCOMTR-MCNC: 115 MG/DL (ref 70–130)
GLUCOSE SERPL-MCNC: 132 MG/DL (ref 65–99)
GLUCOSE UR STRIP-MCNC: NEGATIVE MG/DL
HCT VFR BLD AUTO: 33.2 % (ref 34–46.6)
HGB BLD-MCNC: 10 G/DL (ref 12–15.9)
HGB UR QL STRIP.AUTO: ABNORMAL
HOLD SPECIMEN: NORMAL
HOLD SPECIMEN: NORMAL
HYALINE CASTS UR QL AUTO: ABNORMAL /LPF
IMM GRANULOCYTES # BLD AUTO: 0.07 10*3/MM3 (ref 0–0.05)
IMM GRANULOCYTES NFR BLD AUTO: 0.5 % (ref 0–0.5)
KETONES UR QL STRIP: NEGATIVE
LEUKOCYTE ESTERASE UR QL STRIP.AUTO: ABNORMAL
LIPASE SERPL-CCNC: 28 U/L (ref 13–60)
LYMPHOCYTES # BLD AUTO: 1.99 10*3/MM3 (ref 0.7–3.1)
LYMPHOCYTES NFR BLD AUTO: 13.9 % (ref 19.6–45.3)
MCH RBC QN AUTO: 22.9 PG (ref 26.6–33)
MCHC RBC AUTO-ENTMCNC: 30.1 G/DL (ref 31.5–35.7)
MCV RBC AUTO: 76.1 FL (ref 79–97)
MONOCYTES # BLD AUTO: 1.45 10*3/MM3 (ref 0.1–0.9)
MONOCYTES NFR BLD AUTO: 10.1 % (ref 5–12)
NEUTROPHILS NFR BLD AUTO: 10.71 10*3/MM3 (ref 1.7–7)
NEUTROPHILS NFR BLD AUTO: 74.6 % (ref 42.7–76)
NITRITE UR QL STRIP: NEGATIVE
NRBC BLD AUTO-RTO: 0 /100 WBC (ref 0–0.2)
PH UR STRIP.AUTO: 5.5 [PH] (ref 5–8)
PLATELET # BLD AUTO: 347 10*3/MM3 (ref 140–450)
PMV BLD AUTO: 8.8 FL (ref 6–12)
POTASSIUM SERPL-SCNC: 3.9 MMOL/L (ref 3.5–5.2)
PROT SERPL-MCNC: 7.8 G/DL (ref 6–8.5)
PROT UR QL STRIP: NEGATIVE
RBC # BLD AUTO: 4.36 10*6/MM3 (ref 3.77–5.28)
RBC # UR STRIP: ABNORMAL /HPF
REF LAB TEST METHOD: ABNORMAL
SODIUM SERPL-SCNC: 137 MMOL/L (ref 136–145)
SP GR UR STRIP: 1.02 (ref 1–1.03)
SQUAMOUS #/AREA URNS HPF: ABNORMAL /HPF
UROBILINOGEN UR QL STRIP: ABNORMAL
WBC # UR STRIP: ABNORMAL /HPF
WBC NRBC COR # BLD AUTO: 14.35 10*3/MM3 (ref 3.4–10.8)
WHOLE BLOOD HOLD COAG: NORMAL
WHOLE BLOOD HOLD SPECIMEN: NORMAL

## 2025-07-09 PROCEDURE — 82948 REAGENT STRIP/BLOOD GLUCOSE: CPT

## 2025-07-09 PROCEDURE — 83605 ASSAY OF LACTIC ACID: CPT | Performed by: NURSE PRACTITIONER

## 2025-07-09 PROCEDURE — 81001 URINALYSIS AUTO W/SCOPE: CPT | Performed by: NURSE PRACTITIONER

## 2025-07-09 PROCEDURE — 25010000002 PIPERACILLIN SOD-TAZOBACTAM PER 1 G: Performed by: INTERNAL MEDICINE

## 2025-07-09 PROCEDURE — 83690 ASSAY OF LIPASE: CPT | Performed by: NURSE PRACTITIONER

## 2025-07-09 PROCEDURE — 25010000002 FAMOTIDINE 10 MG/ML SOLUTION: Performed by: NURSE PRACTITIONER

## 2025-07-09 PROCEDURE — 25010000002 MORPHINE PER 10 MG: Performed by: INTERNAL MEDICINE

## 2025-07-09 PROCEDURE — 25010000002 MORPHINE PER 10 MG: Performed by: EMERGENCY MEDICINE

## 2025-07-09 PROCEDURE — 25010000002 ONDANSETRON PER 1 MG: Performed by: INTERNAL MEDICINE

## 2025-07-09 PROCEDURE — 99285 EMERGENCY DEPT VISIT HI MDM: CPT

## 2025-07-09 PROCEDURE — 25010000002 AMPICILLIN-SULBACTAM PER 1.5 G: Performed by: EMERGENCY MEDICINE

## 2025-07-09 PROCEDURE — 25510000001 IOPAMIDOL 61 % SOLUTION: Performed by: EMERGENCY MEDICINE

## 2025-07-09 PROCEDURE — G0378 HOSPITAL OBSERVATION PER HR: HCPCS

## 2025-07-09 PROCEDURE — 87040 BLOOD CULTURE FOR BACTERIA: CPT | Performed by: EMERGENCY MEDICINE

## 2025-07-09 PROCEDURE — 74177 CT ABD & PELVIS W/CONTRAST: CPT

## 2025-07-09 PROCEDURE — 80053 COMPREHEN METABOLIC PANEL: CPT | Performed by: NURSE PRACTITIONER

## 2025-07-09 PROCEDURE — 25010000002 MORPHINE PER 10 MG: Performed by: NURSE PRACTITIONER

## 2025-07-09 PROCEDURE — 25010000002 ONDANSETRON PER 1 MG: Performed by: NURSE PRACTITIONER

## 2025-07-09 PROCEDURE — 36415 COLL VENOUS BLD VENIPUNCTURE: CPT | Performed by: EMERGENCY MEDICINE

## 2025-07-09 PROCEDURE — 85025 COMPLETE CBC W/AUTO DIFF WBC: CPT | Performed by: NURSE PRACTITIONER

## 2025-07-09 RX ORDER — OXYBUTYNIN CHLORIDE 10 MG/1
10 TABLET, EXTENDED RELEASE ORAL DAILY
Status: DISCONTINUED | OUTPATIENT
Start: 2025-07-09 | End: 2025-07-12 | Stop reason: HOSPADM

## 2025-07-09 RX ORDER — FAMOTIDINE 20 MG/1
20 TABLET, FILM COATED ORAL
Status: DISCONTINUED | OUTPATIENT
Start: 2025-07-10 | End: 2025-07-10

## 2025-07-09 RX ORDER — IOPAMIDOL 612 MG/ML
100 INJECTION, SOLUTION INTRAVASCULAR
Status: COMPLETED | OUTPATIENT
Start: 2025-07-09 | End: 2025-07-09

## 2025-07-09 RX ORDER — HYDRALAZINE HYDROCHLORIDE 25 MG/1
25 TABLET, FILM COATED ORAL 3 TIMES DAILY
Status: DISCONTINUED | OUTPATIENT
Start: 2025-07-09 | End: 2025-07-12 | Stop reason: HOSPADM

## 2025-07-09 RX ORDER — ALBUTEROL SULFATE 0.83 MG/ML
2.5 SOLUTION RESPIRATORY (INHALATION) EVERY 6 HOURS PRN
Status: DISCONTINUED | OUTPATIENT
Start: 2025-07-09 | End: 2025-07-12 | Stop reason: HOSPADM

## 2025-07-09 RX ORDER — BISACODYL 5 MG/1
5 TABLET, DELAYED RELEASE ORAL DAILY PRN
Status: DISCONTINUED | OUTPATIENT
Start: 2025-07-09 | End: 2025-07-12 | Stop reason: HOSPADM

## 2025-07-09 RX ORDER — INSULIN LISPRO 100 [IU]/ML
2-7 INJECTION, SOLUTION INTRAVENOUS; SUBCUTANEOUS
Status: DISCONTINUED | OUTPATIENT
Start: 2025-07-09 | End: 2025-07-12 | Stop reason: HOSPADM

## 2025-07-09 RX ORDER — MULTIVITAMIN WITH IRON
500 TABLET ORAL DAILY
COMMUNITY

## 2025-07-09 RX ORDER — POLYETHYLENE GLYCOL 3350 17 G/17G
17 POWDER, FOR SOLUTION ORAL DAILY PRN
Status: DISCONTINUED | OUTPATIENT
Start: 2025-07-09 | End: 2025-07-12 | Stop reason: HOSPADM

## 2025-07-09 RX ORDER — ONDANSETRON 2 MG/ML
4 INJECTION INTRAMUSCULAR; INTRAVENOUS EVERY 6 HOURS PRN
Status: DISCONTINUED | OUTPATIENT
Start: 2025-07-09 | End: 2025-07-12 | Stop reason: HOSPADM

## 2025-07-09 RX ORDER — RANOLAZINE 500 MG/1
1000 TABLET, EXTENDED RELEASE ORAL 2 TIMES DAILY
Status: DISCONTINUED | OUTPATIENT
Start: 2025-07-09 | End: 2025-07-09

## 2025-07-09 RX ORDER — FAMOTIDINE 10 MG/ML
20 INJECTION, SOLUTION INTRAVENOUS ONCE
Status: COMPLETED | OUTPATIENT
Start: 2025-07-09 | End: 2025-07-09

## 2025-07-09 RX ORDER — MULTIPLE VITAMINS W/ MINERALS TAB 9MG-400MCG
1 TAB ORAL DAILY
Status: DISCONTINUED | OUTPATIENT
Start: 2025-07-09 | End: 2025-07-12 | Stop reason: HOSPADM

## 2025-07-09 RX ORDER — ONDANSETRON 4 MG/1
4 TABLET, ORALLY DISINTEGRATING ORAL EVERY 6 HOURS PRN
Status: DISCONTINUED | OUTPATIENT
Start: 2025-07-09 | End: 2025-07-12 | Stop reason: HOSPADM

## 2025-07-09 RX ORDER — RANOLAZINE 500 MG/1
500 TABLET, EXTENDED RELEASE ORAL 2 TIMES DAILY
COMMUNITY

## 2025-07-09 RX ORDER — MORPHINE SULFATE 2 MG/ML
4 INJECTION, SOLUTION INTRAMUSCULAR; INTRAVENOUS ONCE
Status: COMPLETED | OUTPATIENT
Start: 2025-07-09 | End: 2025-07-09

## 2025-07-09 RX ORDER — NICOTINE POLACRILEX 4 MG
15 LOZENGE BUCCAL
Status: DISCONTINUED | OUTPATIENT
Start: 2025-07-09 | End: 2025-07-12 | Stop reason: HOSPADM

## 2025-07-09 RX ORDER — ASPIRIN 81 MG/1
81 TABLET ORAL DAILY
Status: DISCONTINUED | OUTPATIENT
Start: 2025-07-09 | End: 2025-07-12 | Stop reason: HOSPADM

## 2025-07-09 RX ORDER — DEXTROSE MONOHYDRATE 25 G/50ML
25 INJECTION, SOLUTION INTRAVENOUS
Status: DISCONTINUED | OUTPATIENT
Start: 2025-07-09 | End: 2025-07-12 | Stop reason: HOSPADM

## 2025-07-09 RX ORDER — ACETAMINOPHEN 325 MG/1
650 TABLET ORAL EVERY 4 HOURS PRN
Status: DISCONTINUED | OUTPATIENT
Start: 2025-07-09 | End: 2025-07-12 | Stop reason: HOSPADM

## 2025-07-09 RX ORDER — SODIUM CHLORIDE 0.9 % (FLUSH) 0.9 %
10 SYRINGE (ML) INJECTION AS NEEDED
Status: DISCONTINUED | OUTPATIENT
Start: 2025-07-09 | End: 2025-07-12 | Stop reason: HOSPADM

## 2025-07-09 RX ORDER — AMOXICILLIN 250 MG
2 CAPSULE ORAL 2 TIMES DAILY PRN
Status: DISCONTINUED | OUTPATIENT
Start: 2025-07-09 | End: 2025-07-12 | Stop reason: HOSPADM

## 2025-07-09 RX ORDER — RANOLAZINE 500 MG/1
500 TABLET, EXTENDED RELEASE ORAL 2 TIMES DAILY
Status: DISCONTINUED | OUTPATIENT
Start: 2025-07-10 | End: 2025-07-12 | Stop reason: HOSPADM

## 2025-07-09 RX ORDER — IBUPROFEN 600 MG/1
1 TABLET ORAL
Status: DISCONTINUED | OUTPATIENT
Start: 2025-07-09 | End: 2025-07-12 | Stop reason: HOSPADM

## 2025-07-09 RX ORDER — ONDANSETRON 2 MG/ML
4 INJECTION INTRAMUSCULAR; INTRAVENOUS ONCE
Status: COMPLETED | OUTPATIENT
Start: 2025-07-09 | End: 2025-07-09

## 2025-07-09 RX ORDER — MORPHINE SULFATE 2 MG/ML
4 INJECTION, SOLUTION INTRAMUSCULAR; INTRAVENOUS EVERY 4 HOURS PRN
Status: DISCONTINUED | OUTPATIENT
Start: 2025-07-09 | End: 2025-07-10

## 2025-07-09 RX ORDER — BISACODYL 10 MG
10 SUPPOSITORY, RECTAL RECTAL DAILY PRN
Status: DISCONTINUED | OUTPATIENT
Start: 2025-07-09 | End: 2025-07-12 | Stop reason: HOSPADM

## 2025-07-09 RX ORDER — ACETAMINOPHEN 650 MG/1
650 SUPPOSITORY RECTAL EVERY 4 HOURS PRN
Status: DISCONTINUED | OUTPATIENT
Start: 2025-07-09 | End: 2025-07-12 | Stop reason: HOSPADM

## 2025-07-09 RX ORDER — ACETAMINOPHEN 160 MG/5ML
650 SOLUTION ORAL EVERY 4 HOURS PRN
Status: DISCONTINUED | OUTPATIENT
Start: 2025-07-09 | End: 2025-07-12 | Stop reason: HOSPADM

## 2025-07-09 RX ADMIN — MORPHINE SULFATE 4 MG: 2 INJECTION, SOLUTION INTRAMUSCULAR; INTRAVENOUS at 12:56

## 2025-07-09 RX ADMIN — ONDANSETRON 4 MG: 2 INJECTION, SOLUTION INTRAMUSCULAR; INTRAVENOUS at 12:56

## 2025-07-09 RX ADMIN — PIPERACILLIN AND TAZOBACTAM 3.38 G: 3; .375 INJECTION, POWDER, FOR SOLUTION INTRAVENOUS at 22:00

## 2025-07-09 RX ADMIN — AMPICILLIN SODIUM AND SULBACTAM SODIUM 3 G: 2; 1 INJECTION, POWDER, FOR SOLUTION INTRAMUSCULAR; INTRAVENOUS at 18:18

## 2025-07-09 RX ADMIN — MORPHINE SULFATE 4 MG: 2 INJECTION, SOLUTION INTRAMUSCULAR; INTRAVENOUS at 22:12

## 2025-07-09 RX ADMIN — Medication 2.5 MG: at 21:59

## 2025-07-09 RX ADMIN — DICLOFENAC SODIUM 4 G: 10 GEL TOPICAL at 21:59

## 2025-07-09 RX ADMIN — IOPAMIDOL 85 ML: 612 INJECTION, SOLUTION INTRAVENOUS at 15:16

## 2025-07-09 RX ADMIN — MORPHINE SULFATE 4 MG: 2 INJECTION, SOLUTION INTRAMUSCULAR; INTRAVENOUS at 18:05

## 2025-07-09 RX ADMIN — ONDANSETRON 4 MG: 2 INJECTION, SOLUTION INTRAMUSCULAR; INTRAVENOUS at 22:18

## 2025-07-09 RX ADMIN — FAMOTIDINE 20 MG: 10 INJECTION INTRAVENOUS at 12:56

## 2025-07-09 RX ADMIN — RANOLAZINE 500 MG: 500 TABLET, FILM COATED, EXTENDED RELEASE ORAL at 22:00
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
## 2025-07-09 NOTE — ED PROVIDER NOTES
MD ATTESTATION NOTE  I supervised care provided by the APC. We have discussed this patient's history, physical exam, and treatment plan. I have reviewed the APC's note and I agree with the APC's findings and plan of care.   SHARED VISIT: This visit was performed by BOTH a physician and an APC. The substantive portion of the medical decision making was performed by this attesting physician who made or approved the management plan and takes responsibility for patient management. All studies in the APC note (if performed) were independently interpreted by me.   I have personally had a face to face encounter with the patient.     PCP: Melissa Billy APRN  Patient Care Team:  Melissa Billy APRN as PCP - General (Family Medicine)  Dilma Burch PA-C as Physician Assistant (Colon and Rectal Surgery)     Domitila Paiz is a 69 y.o. female who presents to the ED c/o abdominal pain.  Patient has had 2 days of left lower quadrant abdominal pain.  She has had associated nausea without vomiting.  No fevers or chills.  No diarrhea or constipation.     On exam:  General: NAD.  Head: NCAT.  ENT: nares patent, no scleral icterus  Neck: Supple, trachea midline.  Cardiac: regular rate and rhythm.  Lungs: normal effort, clear to auscultation bilaterally  Abdomen: Soft, nondistended, tender to palpation left lower quadrant, no rebound tenderness, no guarding or rigidity.   MS: Moves all extremities well, no peripheral edema  Neuro: alert, MAEW, follows commands  Psych: calm, cooperative  Skin: Warm, dry.    Medical Decision Making:  After the initial H&P, I discussed pertinent information from history and physical exam with patient/family.  Discussed differential diagnosis.  Discussed plan for ED evaluation/work-up/treatment.  All questions answered.  Patient/family is agreeable with plan.    ED Course as of 07/09/25 1653   Wed Jul 09, 2025   1310 WBC(!): 14.35 [JG]   1311 Hemoglobin(!): 10.0 [JG]   1311  Hematocrit(!): 33.2 [JG]   1311 MCV(!): 76.1 [JG]   1311 MCH(!): 22.9 [JG]   1311 MCHC(!): 30.1 [JG]   1311 RDW(!): 16.3 [JG]   1311 Squamous Epithelial Cells, UA: 0-2 [JG]   1311 Bacteria, UA: None Seen [JG]   1311 WBC, UA: 0-2 [JG]   1311 RBC, UA(!): 21-50 [JG]   1311 Blood, UA(!): Moderate (2+) [JG]   1327 Glucose(!): 132 [JG]   1327 BUN: 10.0 [JG]   1327 Creatinine: 0.81 [JG]   1327 Sodium: 137 [JG]   1327 Potassium: 3.9 [JG]   1327 ALT (SGPT): 20 [JG]   1327 AST (SGOT): 21 [JG]   1327 Total Bilirubin: 0.4 [JG]   1440 Lactate: 1.1 [JG]   1532 CT Abdomen Pelvis With Contrast  My independent interpretation of the imaging study is sigmoid diverticulitis without abscess or perforation [AB]   1557 Turnover to Dr. Radford pending CT abdomen and pelvis and dispo.  [JG]   1631 Updated patient on results and indication for admission.  She is agreeable. [AB]   1652 Phone call with Dr. Zapata with [facility].  Discussed the patient, relevant history, exam, diagnostics, ED findings/progress, and concerns. They agree to admit the patient to telemetry. Care assumed by the admitting physician at this time.   [AB]      ED Course User Index  [AB] Romario Radford MD  [JG] Melony Berrios APRN       Diagnosis  Final diagnoses:   Sigmoid diverticulitis        Romario Radford MD  07/09/25 [id number]
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
## 2025-07-09 NOTE — ED NOTES
Nursing report ED to floor  Domitila Paiz  69 y.o.  female    HPI :  HPI  Stated Reason for Visit: From home with c/o L lower abd cramping since last night  History Obtained From: patient    Chief Complaint  Chief Complaint   Patient presents with    Abdominal Pain       Admitting doctor:   Brigitte Zapata MD    Admitting diagnosis:   The encounter diagnosis was Sigmoid diverticulitis.    Code status:   Current Code Status       Date Active Code Status Order ID Comments User Context       Prior            Allergies:   Morphine, Nsaids, Wasp venom, Amlodipine, Lisinopril, Pravastatin, Betadine [povidone iodine], Butorphanol, Other, Poison ivy extract, and Valsartan    Isolation:   No active isolations    Intake and Output  No intake or output data in the 24 hours ending 07/09/25 1732    Weight:   There were no vitals filed for this visit.    Most recent vitals:   Vitals:    07/09/25 1224 07/09/25 1225 07/09/25 1251   BP:  171/83    BP Location:  Right arm    Patient Position:  Sitting    Pulse: 104     Resp: 16     Temp: 97.7 °F (36.5 °C)  99.1 °F (37.3 °C)   TempSrc:   Oral   SpO2: 99%         Active LDAs/IV Access:   Lines, Drains & Airways       Active LDAs       Name Placement date Placement time Site Days    Peripheral IV 07/09/25 1454 20 G Anterior;Left Forearm 07/09/25  1454  Forearm  less than 1                    Labs (abnormal labs have a star):   Labs Reviewed   COMPREHENSIVE METABOLIC PANEL - Abnormal; Notable for the following components:       Result Value    Glucose 132 (*)     Alkaline Phosphatase 118 (*)     All other components within normal limits    Narrative:     GFR Categories in Chronic Kidney Disease (CKD)              GFR Category          GFR (mL/min/1.73)    Interpretation  G1                    90 or greater        Normal or high (1)  G2                    60-89                Mild decrease (1)  G3a                   45-59                Mild to moderate decrease  G3b                    30-44                Moderate to severe decrease  G4                    15-29                Severe decrease  G5                    14 or less           Kidney failure    (1)In the absence of evidence of kidney disease, neither GFR category G1 or G2 fulfill the criteria for CKD.    eGFR calculation 2021 CKD-EPI creatinine equation, which does not include race as a factor   URINALYSIS W/ MICROSCOPIC IF INDICATED (NO CULTURE) - Abnormal; Notable for the following components:    Blood, UA Moderate (2+) (*)     Leuk Esterase, UA Trace (*)     All other components within normal limits   CBC WITH AUTO DIFFERENTIAL - Abnormal; Notable for the following components:    WBC 14.35 (*)     Hemoglobin 10.0 (*)     Hematocrit 33.2 (*)     MCV 76.1 (*)     MCH 22.9 (*)     MCHC 30.1 (*)     RDW 16.3 (*)     Lymphocyte % 13.9 (*)     Neutrophils, Absolute 10.71 (*)     Monocytes, Absolute 1.45 (*)     Immature Grans, Absolute 0.07 (*)     All other components within normal limits   URINALYSIS, MICROSCOPIC ONLY - Abnormal; Notable for the following components:    RBC, UA 21-50 (*)     All other components within normal limits   LIPASE - Normal   LACTIC ACID, PLASMA - Normal   BLOOD CULTURE   BLOOD CULTURE   RAINBOW DRAW    Narrative:     The following orders were created for panel order [place] Draw.  Procedure                               Abnormality         Status                     ---------                               -----------         ------                     Green Top (Gel)[[id number]]                                  Final result               Lavender Top[[id number]]                                     Final result               Gold Top - SST[[id number]]                                   Final result               Light Blue Top[[id number]]                                   Final result                 Please view results for these tests on the individual orders.   CBC AND DIFFERENTIAL    Narrative:     The  following orders were created for panel order CBC & Differential.  Procedure                               Abnormality         Status                     ---------                               -----------         ------                     CBC Auto Differential[[id number]]        Abnormal            Final result                 Please view results for these tests on the individual orders.   GREEN TOP   LAVENDER TOP   GOLD TOP - SST   LIGHT BLUE TOP       EKG:   No orders to display       Meds given in ED:   Medications   sodium chloride 0.9 % flush 10 mL (has no administration in time range)   ampicillin-sulbactam (UNASYN) 3 g in sodium chloride 0.9 % 100 mL MBP (has no administration in time range)   morphine injection 4 mg (has no administration in time range)   ondansetron (ZOFRAN) injection 4 mg (4 mg Intravenous Given 7/9/25 1256)   morphine injection 4 mg (4 mg Intravenous Given 7/9/25 1256)   famotidine (PEPCID) injection 20 mg (20 mg Intravenous Given 7/9/25 1256)   iopamidol (ISOVUE-300) 61 % injection 100 mL (85 mL Intravenous Given 7/9/25 1516)       Imaging results:  No radiology results for the last day    Ambulatory status:   - ad jasmin    Social issues:   Social History     Socioeconomic History    Marital status:    Tobacco Use    Smoking status: Never     Passive exposure: Never    Smokeless tobacco: Never   Vaping Use    Vaping status: Never Used   Substance and Sexual Activity    Alcohol use: No     Comment: caffeine use     Drug use: No    Sexual activity: Yes     Partners: Male     Birth control/protection: Surgical, Hysterectomy, Post-menopausal, Tubal ligation       Peripheral Neurovascular  Peripheral Neurovascular (Adult)  Peripheral Neurovascular WDL: WDL    Neuro Cognitive  Neuro Cognitive (Adult)  Cognitive/Neuro/Behavioral WDL: WDL    Learning       Respiratory  Respiratory WDL  Respiratory WDL: WDL    Abdominal Pain       Pain Assessments  Pain (Adult)  (0-10) Pain Rating: Rest:  9  (0-10) Pain Rating: Activity: 9  Pain Location: flank  Pain Side/Orientation: left    NIH Stroke Scale       Zoltan Palacios RN  07/09/25 17:32 EDT

## 2025-07-10 ENCOUNTER — TELEPHONE (OUTPATIENT)
Age: 70
End: 2025-07-10

## 2025-07-10 LAB
ANION GAP SERPL CALCULATED.3IONS-SCNC: 12 MMOL/L (ref 5–15)
BUN SERPL-MCNC: 12 MG/DL (ref 8–23)
BUN/CREAT SERPL: 12.9 (ref 7–25)
CALCIUM SPEC-SCNC: 8.5 MG/DL (ref 8.6–10.5)
CHLORIDE SERPL-SCNC: 97 MMOL/L (ref 98–107)
CO2 SERPL-SCNC: 25 MMOL/L (ref 22–29)
CREAT SERPL-MCNC: 0.93 MG/DL (ref 0.57–1)
DEPRECATED RDW RBC AUTO: 44.6 FL (ref 37–54)
EGFRCR SERPLBLD CKD-EPI 2021: 66.7 ML/MIN/1.73
ERYTHROCYTE [DISTWIDTH] IN BLOOD BY AUTOMATED COUNT: 16.2 % (ref 12.3–15.4)
GLUCOSE BLDC GLUCOMTR-MCNC: 154 MG/DL (ref 70–130)
GLUCOSE BLDC GLUCOMTR-MCNC: 165 MG/DL (ref 70–130)
GLUCOSE BLDC GLUCOMTR-MCNC: 98 MG/DL (ref 70–130)
GLUCOSE BLDC GLUCOMTR-MCNC: 98 MG/DL (ref 70–130)
GLUCOSE SERPL-MCNC: 115 MG/DL (ref 65–99)
HCT VFR BLD AUTO: 26 % (ref 34–46.6)
HCT VFR BLD AUTO: 27 % (ref 34–46.6)
HGB BLD-MCNC: 8 G/DL (ref 12–15.9)
HGB BLD-MCNC: 8.1 G/DL (ref 12–15.9)
MCH RBC QN AUTO: 23.7 PG (ref 26.6–33)
MCHC RBC AUTO-ENTMCNC: 30.8 G/DL (ref 31.5–35.7)
MCV RBC AUTO: 76.9 FL (ref 79–97)
PLATELET # BLD AUTO: 264 10*3/MM3 (ref 140–450)
PMV BLD AUTO: 9.1 FL (ref 6–12)
POTASSIUM SERPL-SCNC: 3.6 MMOL/L (ref 3.5–5.2)
RBC # BLD AUTO: 3.38 10*6/MM3 (ref 3.77–5.28)
SODIUM SERPL-SCNC: 134 MMOL/L (ref 136–145)
WBC NRBC COR # BLD AUTO: 8.25 10*3/MM3 (ref 3.4–10.8)

## 2025-07-10 PROCEDURE — 99221 1ST HOSP IP/OBS SF/LOW 40: CPT | Performed by: PHYSICIAN ASSISTANT

## 2025-07-10 PROCEDURE — 80048 BASIC METABOLIC PNL TOTAL CA: CPT | Performed by: INTERNAL MEDICINE

## 2025-07-10 PROCEDURE — 85014 HEMATOCRIT: CPT | Performed by: INTERNAL MEDICINE

## 2025-07-10 PROCEDURE — 25010000002 ONDANSETRON PER 1 MG: Performed by: INTERNAL MEDICINE

## 2025-07-10 PROCEDURE — 63710000001 INSULIN LISPRO (HUMAN) PER 5 UNITS: Performed by: INTERNAL MEDICINE

## 2025-07-10 PROCEDURE — 25010000002 PIPERACILLIN SOD-TAZOBACTAM PER 1 G: Performed by: INTERNAL MEDICINE

## 2025-07-10 PROCEDURE — 85018 HEMOGLOBIN: CPT | Performed by: INTERNAL MEDICINE

## 2025-07-10 PROCEDURE — 85027 COMPLETE CBC AUTOMATED: CPT | Performed by: INTERNAL MEDICINE

## 2025-07-10 PROCEDURE — 25010000002 MORPHINE PER 10 MG: Performed by: INTERNAL MEDICINE

## 2025-07-10 PROCEDURE — 82948 REAGENT STRIP/BLOOD GLUCOSE: CPT

## 2025-07-10 RX ORDER — HYDROCODONE BITARTRATE AND ACETAMINOPHEN 5; 325 MG/1; MG/1
1 TABLET ORAL EVERY 4 HOURS PRN
Refills: 0 | Status: DISCONTINUED | OUTPATIENT
Start: 2025-07-10 | End: 2025-07-12 | Stop reason: HOSPADM

## 2025-07-10 RX ORDER — HYDROMORPHONE HYDROCHLORIDE 1 MG/ML
0.5 INJECTION, SOLUTION INTRAMUSCULAR; INTRAVENOUS; SUBCUTANEOUS
Refills: 0 | Status: DISCONTINUED | OUTPATIENT
Start: 2025-07-10 | End: 2025-07-12 | Stop reason: HOSPADM

## 2025-07-10 RX ORDER — PANTOPRAZOLE SODIUM 40 MG/1
40 TABLET, DELAYED RELEASE ORAL
Status: DISCONTINUED | OUTPATIENT
Start: 2025-07-10 | End: 2025-07-12 | Stop reason: HOSPADM

## 2025-07-10 RX ORDER — FAMOTIDINE 20 MG/1
20 TABLET, FILM COATED ORAL DAILY PRN
Status: DISCONTINUED | OUTPATIENT
Start: 2025-07-10 | End: 2025-07-12 | Stop reason: HOSPADM

## 2025-07-10 RX ADMIN — PIPERACILLIN AND TAZOBACTAM 3.38 G: 3; .375 INJECTION, POWDER, FOR SOLUTION INTRAVENOUS at 19:55

## 2025-07-10 RX ADMIN — HYDRALAZINE HYDROCHLORIDE 12.5 MG: 25 TABLET ORAL at 19:55

## 2025-07-10 RX ADMIN — RANOLAZINE 500 MG: 500 TABLET, FILM COATED, EXTENDED RELEASE ORAL at 19:55

## 2025-07-10 RX ADMIN — Medication 1 TABLET: at 09:27

## 2025-07-10 RX ADMIN — ONDANSETRON 4 MG: 2 INJECTION, SOLUTION INTRAMUSCULAR; INTRAVENOUS at 09:47

## 2025-07-10 RX ADMIN — HYDROCODONE BITARTRATE AND ACETAMINOPHEN 0.5 TABLET: 5; 325 TABLET ORAL at 21:02

## 2025-07-10 RX ADMIN — DICLOFENAC SODIUM 4 G: 10 GEL TOPICAL at 15:32

## 2025-07-10 RX ADMIN — OXYBUTYNIN CHLORIDE 10 MG: 10 TABLET, EXTENDED RELEASE ORAL at 09:27

## 2025-07-10 RX ADMIN — PIPERACILLIN AND TAZOBACTAM 3.38 G: 3; .375 INJECTION, POWDER, FOR SOLUTION INTRAVENOUS at 04:25

## 2025-07-10 RX ADMIN — INSULIN LISPRO 2 UNITS: 100 INJECTION, SOLUTION INTRAVENOUS; SUBCUTANEOUS at 21:01

## 2025-07-10 RX ADMIN — ASPIRIN 81 MG: 81 TABLET, COATED ORAL at 09:27

## 2025-07-10 RX ADMIN — PIPERACILLIN AND TAZOBACTAM 3.38 G: 3; .375 INJECTION, POWDER, FOR SOLUTION INTRAVENOUS at 12:01

## 2025-07-10 RX ADMIN — RANOLAZINE 500 MG: 500 TABLET, FILM COATED, EXTENDED RELEASE ORAL at 09:27

## 2025-07-10 RX ADMIN — DICLOFENAC SODIUM 4 G: 10 GEL TOPICAL at 09:27

## 2025-07-10 RX ADMIN — MORPHINE SULFATE 4 MG: 2 INJECTION, SOLUTION INTRAMUSCULAR; INTRAVENOUS at 09:47

## 2025-07-10 RX ADMIN — HYDRALAZINE HYDROCHLORIDE 25 MG: 25 TABLET ORAL at 09:27

## 2025-07-10 RX ADMIN — PANTOPRAZOLE SODIUM 40 MG: 40 TABLET, DELAYED RELEASE ORAL at 09:27
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
## 2025-07-10 NOTE — PLAN OF CARE
Goal Outcome Evaluation:  Plan of Care Reviewed With: patient        Progress: no change  Outcome Evaluation: Tolerating CL diet.  Did not havenausea this shift until Morphine IV given for pain.  Up ad jasmin to BR.  Pain is generalized to abd, but wose on left side.

## 2025-07-10 NOTE — PLAN OF CARE
Goal Outcome Evaluation:              Outcome Evaluation: VSS, up to bathroom ad jasmin, prn pain meds,  at bedside, will continue plan of care.

## 2025-07-10 NOTE — PROGRESS NOTES
Name: Domitila Paiz ADMIT: 2025   : 1955  PCP: Melissa Billy APRN    MRN: [id number] LOS: 0 days   AGE/SEX: 69 y.o. female  ROOM: [facility]     Subjective   Subjective   Chief Complaint   Patient presents with    Abdominal Pain     Pain improved but still intermittent. Nausea improved. Had worse nausea and pruritus after iv morphine. Tolerated Norco in the past after she had a previous surgery. No CP or SOA.     Objective   Objective   Vital Signs  Temp:  [97.9 °F (36.6 °C)-98.8 °F (37.1 °C)] 98.4 °F (36.9 °C)  Heart Rate:  [] 87  Resp:  [18] 18  BP: ()/(47-88) 109/47  SpO2:  [92 %-97 %] 97 %  on   ;   Device (Oxygen Therapy): room air  Body mass index is 25.29 kg/m².    Physical Exam  Vitals and nursing note reviewed.   Constitutional:       General: She is not in acute distress.     Appearance: She is not diaphoretic.   Cardiovascular:      Rate and Rhythm: Normal rate and regular rhythm.      Pulses: Normal pulses.   Pulmonary:      Effort: Pulmonary effort is normal.      Breath sounds: No wheezing.   Abdominal:      General: There is no distension.      Palpations: Abdomen is soft.      Tenderness: There is abdominal tenderness.   Musculoskeletal:      Right lower leg: No edema.      Left lower leg: No edema.   Skin:     General: Skin is warm and dry.   Neurological:      Mental Status: She is alert.      Cranial Nerves: No cranial nerve deficit.   Psychiatric:         Mood and Affect: Mood normal.         Behavior: Behavior normal.       Results Review  I reviewed the patient's new clinical results.    Results from last 7 days   Lab Units 07/10/25  0649 25  1250   WBC 10*3/mm3 8.25 14.35*   HEMOGLOBIN g/dL 8.0* 10.0*   PLATELETS 10*3/mm3 264 347     Results from last 7 days   Lab Units 07/10/25  0649 25  1250   SODIUM mmol/L 134* 137   POTASSIUM mmol/L 3.6 3.9   CHLORIDE mmol/L 97* 101   CO2 mmol/L 25.0 24.7   BUN mg/dL 12.0 10.0   CREATININE mg/dL 0.93 0.81    GLUCOSE mg/dL 115* 132*   EGFR mL/min/1.73 66.7 78.7     Results from last 7 days   Lab Units 07/09/25  1250   ALBUMIN g/dL 4.3   BILIRUBIN mg/dL 0.4   ALK PHOS U/L 118*   AST (SGOT) U/L 21   ALT (SGPT) U/L 20     Results from last 7 days   Lab Units 07/10/25  0649 07/09/25  1250   CALCIUM mg/dL 8.5* 9.4   ALBUMIN g/dL  --  4.3     Results from last 7 days   Lab Units 07/09/25  1304   LACTATE mmol/L 1.1     Glucose   Date/Time Value Ref Range Status   07/10/2025 1138 98 70 - 130 mg/dL Final   07/10/2025 0604 154 (H) 70 - 130 mg/dL Final   07/09/2025 2021 115 70 - 130 mg/dL Final       No radiology results for the last day    I have personally reviewed all medications:  Scheduled Medications  aspirin, 81 mg, Oral, Daily  Diclofenac Sodium, 4 g, Topical, TID  hydrALAZINE, 25 mg, Oral, TID  insulin lispro, 2-7 Units, Subcutaneous, 4x Daily AC & at Bedtime  melatonin, 2.5 mg, Oral, Nightly  multivitamin with minerals, 1 tablet, Oral, Daily  oxybutynin XL, 10 mg, Oral, Daily  pantoprazole, 40 mg, Oral, Daily With Breakfast  piperacillin-tazobactam, 3.375 g, Intravenous, Q8H  ranolazine, 500 mg, Oral, BID      Infusions     Diet  Diet: Liquid; Clear Liquid; Fluid Consistency: Thin (IDDSI 0)       Assessment/Plan     Active Hospital Problems    Diagnosis  POA    **Sigmoid diverticulitis [K57.32]  Yes      Resolved Hospital Problems   No resolved problems to display.       69 y.o. female admitted with Sigmoid diverticulitis.    Diverticulitis with Microperforation: Possible colovaginal fistula noted on CT. Continue supportive care and IV antibiotics. Reporting improving symptoms. Adjusting off IV morphine due to itching and nausea. Colorectal surgery following.  Anemia: Microcytic. Repeat HH. Check iron levels. Transfuse as needed.  PAF: Rate ok. Xarelto held acutely. Resume when able.  Hyperglycemia: SSI ordered. Check A1c.  PPX: SCD pending resumption of full AC  Disposition: Home/1-2 days    Expected discharge date/  time has not been documented.     Zane Hoang MD  [facility]  07/10/25  14:33 EDT    Dictated portions of note using Dragon dictation software.  Copied text in this note has been reviewed by me and remains accurate as of 07/10/25

## 2025-07-11 LAB
ALBUMIN SERPL-MCNC: 3.3 G/DL (ref 3.5–5.2)
ALBUMIN/GLOB SERPL: 1.3 G/DL
ALP SERPL-CCNC: 106 U/L (ref 39–117)
ALT SERPL W P-5'-P-CCNC: 12 U/L (ref 1–33)
ANION GAP SERPL CALCULATED.3IONS-SCNC: 10.2 MMOL/L (ref 5–15)
AST SERPL-CCNC: 14 U/L (ref 1–32)
BILIRUB SERPL-MCNC: 0.3 MG/DL (ref 0–1.2)
BUN SERPL-MCNC: 7 MG/DL (ref 8–23)
BUN/CREAT SERPL: 10.1 (ref 7–25)
CALCIUM SPEC-SCNC: 8.5 MG/DL (ref 8.6–10.5)
CHLORIDE SERPL-SCNC: 106 MMOL/L (ref 98–107)
CO2 SERPL-SCNC: 25.8 MMOL/L (ref 22–29)
CREAT SERPL-MCNC: 0.69 MG/DL (ref 0.57–1)
DEPRECATED RDW RBC AUTO: 43.8 FL (ref 37–54)
EGFRCR SERPLBLD CKD-EPI 2021: 94.1 ML/MIN/1.73
ERYTHROCYTE [DISTWIDTH] IN BLOOD BY AUTOMATED COUNT: 16.1 % (ref 12.3–15.4)
FERRITIN SERPL-MCNC: 49.9 NG/ML (ref 13–150)
GLOBULIN UR ELPH-MCNC: 2.6 GM/DL
GLUCOSE BLDC GLUCOMTR-MCNC: 106 MG/DL (ref 70–130)
GLUCOSE BLDC GLUCOMTR-MCNC: 106 MG/DL (ref 70–130)
GLUCOSE BLDC GLUCOMTR-MCNC: 147 MG/DL (ref 70–130)
GLUCOSE BLDC GLUCOMTR-MCNC: 187 MG/DL (ref 70–130)
GLUCOSE SERPL-MCNC: 92 MG/DL (ref 65–99)
HBA1C MFR BLD: 5.7 % (ref 4.8–5.6)
HCT VFR BLD AUTO: 25.9 % (ref 34–46.6)
HGB BLD-MCNC: 7.8 G/DL (ref 12–15.9)
IRON 24H UR-MRATE: 18 MCG/DL (ref 37–145)
IRON SATN MFR SERPL: 6 % (ref 20–50)
MAGNESIUM SERPL-MCNC: 2 MG/DL (ref 1.6–2.4)
MCH RBC QN AUTO: 22.9 PG (ref 26.6–33)
MCHC RBC AUTO-ENTMCNC: 30.1 G/DL (ref 31.5–35.7)
MCV RBC AUTO: 76.2 FL (ref 79–97)
PHOSPHATE SERPL-MCNC: 3.4 MG/DL (ref 2.5–4.5)
PLATELET # BLD AUTO: 273 10*3/MM3 (ref 140–450)
PMV BLD AUTO: 9.2 FL (ref 6–12)
POTASSIUM SERPL-SCNC: 3.6 MMOL/L (ref 3.5–5.2)
PROT SERPL-MCNC: 5.9 G/DL (ref 6–8.5)
RBC # BLD AUTO: 3.4 10*6/MM3 (ref 3.77–5.28)
SODIUM SERPL-SCNC: 142 MMOL/L (ref 136–145)
TIBC SERPL-MCNC: 319 MCG/DL (ref 298–536)
TRANSFERRIN SERPL-MCNC: 214 MG/DL (ref 200–360)
WBC NRBC COR # BLD AUTO: 4.92 10*3/MM3 (ref 3.4–10.8)

## 2025-07-11 PROCEDURE — 83735 ASSAY OF MAGNESIUM: CPT | Performed by: INTERNAL MEDICINE

## 2025-07-11 PROCEDURE — 25010000002 NA FERRIC GLUC CPLX PER 12.5 MG: Performed by: INTERNAL MEDICINE

## 2025-07-11 PROCEDURE — 85027 COMPLETE CBC AUTOMATED: CPT | Performed by: INTERNAL MEDICINE

## 2025-07-11 PROCEDURE — 82728 ASSAY OF FERRITIN: CPT | Performed by: INTERNAL MEDICINE

## 2025-07-11 PROCEDURE — 84466 ASSAY OF TRANSFERRIN: CPT | Performed by: INTERNAL MEDICINE

## 2025-07-11 PROCEDURE — 83540 ASSAY OF IRON: CPT | Performed by: INTERNAL MEDICINE

## 2025-07-11 PROCEDURE — 63710000001 INSULIN LISPRO (HUMAN) PER 5 UNITS: Performed by: INTERNAL MEDICINE

## 2025-07-11 PROCEDURE — 84100 ASSAY OF PHOSPHORUS: CPT | Performed by: INTERNAL MEDICINE

## 2025-07-11 PROCEDURE — 25010000002 PIPERACILLIN SOD-TAZOBACTAM PER 1 G: Performed by: INTERNAL MEDICINE

## 2025-07-11 PROCEDURE — 82948 REAGENT STRIP/BLOOD GLUCOSE: CPT

## 2025-07-11 PROCEDURE — 83036 HEMOGLOBIN GLYCOSYLATED A1C: CPT | Performed by: INTERNAL MEDICINE

## 2025-07-11 PROCEDURE — 80053 COMPREHEN METABOLIC PANEL: CPT | Performed by: INTERNAL MEDICINE

## 2025-07-11 PROCEDURE — 99232 SBSQ HOSP IP/OBS MODERATE 35: CPT | Performed by: PHYSICIAN ASSISTANT

## 2025-07-11 PROCEDURE — 25810000003 SODIUM CHLORIDE 0.9 % SOLUTION: Performed by: INTERNAL MEDICINE

## 2025-07-11 RX ORDER — ACETAMINOPHEN 325 MG/1
650 TABLET ORAL EVERY 24 HOURS
Status: DISCONTINUED | OUTPATIENT
Start: 2025-07-11 | End: 2025-07-12 | Stop reason: HOSPADM

## 2025-07-11 RX ORDER — CETIRIZINE HYDROCHLORIDE 10 MG/1
10 TABLET ORAL EVERY 24 HOURS
Status: DISCONTINUED | OUTPATIENT
Start: 2025-07-11 | End: 2025-07-12 | Stop reason: HOSPADM

## 2025-07-11 RX ADMIN — ASPIRIN 81 MG: 81 TABLET, COATED ORAL at 08:54

## 2025-07-11 RX ADMIN — RANOLAZINE 500 MG: 500 TABLET, FILM COATED, EXTENDED RELEASE ORAL at 21:02

## 2025-07-11 RX ADMIN — AMOXICILLIN AND CLAVULANATE POTASSIUM 1 TABLET: 875; 125 TABLET, COATED ORAL at 21:04

## 2025-07-11 RX ADMIN — HYDRALAZINE HYDROCHLORIDE 25 MG: 25 TABLET ORAL at 16:41

## 2025-07-11 RX ADMIN — PANTOPRAZOLE SODIUM 40 MG: 40 TABLET, DELAYED RELEASE ORAL at 08:54

## 2025-07-11 RX ADMIN — SODIUM CHLORIDE 250 MG: 9 INJECTION, SOLUTION INTRAVENOUS at 16:39

## 2025-07-11 RX ADMIN — CETIRIZINE HYDROCHLORIDE 10 MG: 10 TABLET, FILM COATED ORAL at 15:45

## 2025-07-11 RX ADMIN — SENNOSIDES AND DOCUSATE SODIUM 2 TABLET: 50; 8.6 TABLET ORAL at 21:20

## 2025-07-11 RX ADMIN — RANOLAZINE 500 MG: 500 TABLET, FILM COATED, EXTENDED RELEASE ORAL at 08:54

## 2025-07-11 RX ADMIN — HYDRALAZINE HYDROCHLORIDE 25 MG: 25 TABLET ORAL at 21:02

## 2025-07-11 RX ADMIN — Medication 1 TABLET: at 08:54

## 2025-07-11 RX ADMIN — INSULIN LISPRO 2 UNITS: 100 INJECTION, SOLUTION INTRAVENOUS; SUBCUTANEOUS at 21:02

## 2025-07-11 RX ADMIN — ACETAMINOPHEN 650 MG: 325 TABLET, FILM COATED ORAL at 15:45

## 2025-07-11 RX ADMIN — OXYBUTYNIN CHLORIDE 10 MG: 10 TABLET, EXTENDED RELEASE ORAL at 08:54

## 2025-07-11 RX ADMIN — PIPERACILLIN AND TAZOBACTAM 3.38 G: 3; .375 INJECTION, POWDER, FOR SOLUTION INTRAVENOUS at 02:49

## 2025-07-11 RX ADMIN — HYDROCODONE BITARTRATE AND ACETAMINOPHEN 0.5 TABLET: 5; 325 TABLET ORAL at 08:53

## 2025-07-11 RX ADMIN — DICLOFENAC SODIUM 4 G: 10 GEL TOPICAL at 08:55

## 2025-07-11 RX ADMIN — HYDROCODONE BITARTRATE AND ACETAMINOPHEN 1 TABLET: 5; 325 TABLET ORAL at 02:48

## 2025-07-11 RX ADMIN — DICLOFENAC SODIUM 4 G: 10 GEL TOPICAL at 16:42

## 2025-07-11 RX ADMIN — HYDRALAZINE HYDROCHLORIDE 25 MG: 25 TABLET ORAL at 08:54

## 2025-07-11 RX ADMIN — PIPERACILLIN AND TAZOBACTAM 3.38 G: 3; .375 INJECTION, POWDER, FOR SOLUTION INTRAVENOUS at 11:11

## 2025-07-11 NOTE — PROGRESS NOTES
CRS attending  Patient doing really well  Tolerating regular diet  Has not had a bowel movement    Afebrile vital signs are stable  Abdomen soft    Assessment: Complicated diverticulitis  Plan  Start Augmentin  Follow-up appointment with me in 2 weeks

## 2025-07-11 NOTE — PLAN OF CARE
Goal Outcome Evaluation:  Plan of Care Reviewed With: patient        Progress: improving  Outcome Evaluation:     A&O x4. SR on telemetry. Room air.       VSS.   Temp borderline (99's)       C/O LLQ abdominal pain and headache.   PRN Norco given with improvement.       IV abx.       Accuchecks.   Daily weights.         Will continue to monitor.

## 2025-07-11 NOTE — PROGRESS NOTES
Progress Note    LOS 2      S: Pt feeling much better today. Still some tenderness in the LLQ and lower back, but with decreased severity. Tolerating liquid diet and requesting solid foods. No additional bowel movements overnight.     O:  Temp:  [97.7 °F (36.5 °C)-99.9 °F (37.7 °C)] 97.7 °F (36.5 °C)  Heart Rate:  [76-95] 76  Resp:  [18] 18  BP: ()/(47-69) 144/62      Intake/Output Summary (Last 24 hours) at 7/11/2025 0751  Last data filed at 7/10/2025 2004  Gross per 24 hour   Intake 120 ml   Output --   Net 120 ml       Abd: Soft, non-distended. Mild TTP in the LLQ.       A/P: 69 y.o. female with acute sigmoid diverticulitis with colovaginal fistula.     - Morning labs pending  - Continue IV Zosyn  - Start regular diet today  - Pt clinically improving with medical management. Plan to perform elective sigmoid resection in 4-6 weeks.

## 2025-07-11 NOTE — PROGRESS NOTES
Name: Domitila Paiz ADMIT: 2025   : 1955  PCP: Melissa Billy APRN    MRN: [id number] LOS: 1 days   AGE/SEX: 69 y.o. female  ROOM: [facility]     Subjective   Subjective   Chief Complaint   Patient presents with    Abdominal Pain     Pain improving.  No nausea vomiting reported.  She is not having any chest pain or shortness of breath.  She did have a migraine headache last night which is now resolved.     Objective   Objective   Vital Signs  Temp:  [97.7 °F (36.5 °C)-99.9 °F (37.7 °C)] 97.7 °F (36.5 °C)  Heart Rate:  [76-95] 76  Resp:  [18] 18  BP: ()/(47-69) 144/62  SpO2:  [95 %-99 %] 96 %  on   ;   Device (Oxygen Therapy): room air  Body mass index is 26.03 kg/m².    Physical Exam  Vitals and nursing note reviewed.   Constitutional:       General: She is not in acute distress.     Appearance: She is not diaphoretic.   Cardiovascular:      Rate and Rhythm: Normal rate and regular rhythm.      Pulses: Normal pulses.   Pulmonary:      Effort: Pulmonary effort is normal.      Breath sounds: No wheezing.   Abdominal:      General: There is no distension.      Palpations: Abdomen is soft.      Tenderness: There is abdominal tenderness.   Musculoskeletal:      Right lower leg: No edema.      Left lower leg: No edema.   Skin:     General: Skin is warm and dry.   Neurological:      Mental Status: She is alert.      Cranial Nerves: No cranial nerve deficit.   Psychiatric:         Mood and Affect: Mood normal.         Behavior: Behavior normal.       Results Review  I reviewed the patient's new clinical results.    Results from last 7 days   Lab Units 25  0701 07/10/25  1605 07/10/25  0649 25  1250   WBC 10*3/mm3 4.92  --  8.25 14.35*   HEMOGLOBIN g/dL 7.8* 8.1* 8.0* 10.0*   PLATELETS 10*3/mm3 273  --  264 347     Results from last 7 days   Lab Units 25  0701 07/10/25  0649 25  1250   SODIUM mmol/L 142 134* 137   POTASSIUM mmol/L 3.6 3.6 3.9   CHLORIDE mmol/L 106 97*  101   CO2 mmol/L 25.8 25.0 24.7   BUN mg/dL 7.0* 12.0 10.0   CREATININE mg/dL 0.69 0.93 0.81   GLUCOSE mg/dL 92 115* 132*   EGFR mL/min/1.73 94.1 66.7 78.7     Results from last 7 days   Lab Units 07/11/25  0701 07/09/25  1250   ALBUMIN g/dL 3.3* 4.3   BILIRUBIN mg/dL 0.3 0.4   ALK PHOS U/L 106 118*   AST (SGOT) U/L 14 21   ALT (SGPT) U/L 12 20     Results from last 7 days   Lab Units 07/11/25  0701 07/10/25  0649 07/09/25  1250   CALCIUM mg/dL 8.5* 8.5* 9.4   ALBUMIN g/dL 3.3*  --  4.3   MAGNESIUM mg/dL 2.0  --   --    PHOSPHORUS mg/dL 3.4  --   --      Results from last 7 days   Lab Units 07/09/25  1304   LACTATE mmol/L 1.1     Hemoglobin A1C   Date/Time Value Ref Range Status   07/11/2025 0701 5.70 (H) 4.80 - 5.60 % Final     Glucose   Date/Time Value Ref Range Status   07/11/2025 1109 106 70 - 130 mg/dL Final   07/11/2025 0601 106 70 - 130 mg/dL Final   07/10/2025 2024 165 (H) 70 - 130 mg/dL Final   07/10/2025 1643 98 70 - 130 mg/dL Final   07/10/2025 1138 98 70 - 130 mg/dL Final   07/10/2025 0604 154 (H) 70 - 130 mg/dL Final   07/09/2025 2021 115 70 - 130 mg/dL Final       No radiology results for the last day    I have personally reviewed all medications:  Scheduled Medications  aspirin, 81 mg, Oral, Daily  Diclofenac Sodium, 4 g, Topical, TID  hydrALAZINE, 25 mg, Oral, TID  insulin lispro, 2-7 Units, Subcutaneous, 4x Daily AC & at Bedtime  melatonin, 2.5 mg, Oral, Nightly  multivitamin with minerals, 1 tablet, Oral, Daily  oxybutynin XL, 10 mg, Oral, Daily  pantoprazole, 40 mg, Oral, Daily With Breakfast  piperacillin-tazobactam, 3.375 g, Intravenous, Q8H  ranolazine, 500 mg, Oral, BID      Infusions     Diet  Diet: Regular/House; Texture: Soft to Chew (NDD 3); Soft to Chew: Whole Meat; Fluid Consistency: Thin (IDDSI 0)       Assessment/Plan     Active Hospital Problems    Diagnosis  POA    **Sigmoid diverticulitis [K57.32]  Yes    Paroxysmal atrial fibrillation [I48.0]  Yes    Hypertension [I10]  Yes       Resolved Hospital Problems   No resolved problems to display.       69 y.o. female admitted with Sigmoid diverticulitis.    Diverticulitis with Microperforation: Possible colovaginal fistula noted on CT. continue IV antibiotics today.  Diet has been advanced.  Possible transition to oral antibiotics tomorrow.  Planned elective sigmoid resection in 4 to 6 weeks.  Colorectal surgery following.  Anemia: Iron saturation is 6%.  Ferritin of 49.  No overt GI bleed reported.  Blood cultures are negative so can give IV iron.  GI screening per colorectal as part of her procedure workup.  PAF: Rate ok. Xarelto held acutely.  Potentially resume if hemoglobin is stable tomorrow  Hyperglycemia: A1c 5.7.  PPX: SCD pending resumption of full AC  Disposition: Home/possibly tomorrow    Expected Discharge Date: 7/12/2025; Expected Discharge Time:      Zane Hoang MD  [facility]  07/11/25  12:05 EDT    Dictated portions of note using Dragon dictation software.  Copied text in this note has been reviewed by me and remains accurate as of 07/11/25

## 2025-07-11 NOTE — CASE MANAGEMENT/SOCIAL WORK
Discharge Planning Assessment  [facility]     Patient Name: Domitila Paiz  MRN: [id number]  Today's Date: 7/11/2025    Admit Date: 7/9/2025    Plan: Home with family   Discharge Needs Assessment       Row Name 07/11/25 1542       Living Environment    People in Home spouse;child(les), adult;grandchild(les)    Name(s) of People in Home , daughter, son-in-law, and grandchildren    Current Living Arrangements home    Potentially Unsafe Housing Conditions none    Primary Care Provided by self    Provides Primary Care For no one    Family Caregiver if Needed spouse;child(les), adult    Family Caregiver Names  Chepe, daughter, son-in-law    Quality of Family Relationships supportive    Able to Return to Prior Arrangements yes       Resource/Environmental Concerns    Resource/Environmental Concerns none    Transportation Concerns none       Transition Planning    Patient/Family Anticipates Transition to home with family    Patient/Family Anticipated Services at Transition none    Transportation Anticipated family or friend will provide       Discharge Needs Assessment    Equipment Currently Used at Home none    Concerns to be Addressed no discharge needs identified    Anticipated Changes Related to Illness none    Equipment Needed After Discharge none                   Discharge Plan       Row Name 07/11/25 1545       Plan    Plan Home with family    Patient/Family in Agreement with Plan yes    Plan Comments Spoke with pt and her  on the phone. They stated that the plan is for the pt to return home with family at discharge. Her  will provide transportation. She lives with her , daughter, son-in-law, and grandchildren. She has a knee scooter at home but does not use any medical equipment currently. They denied any need for rehab, HH, or DME at this time. CCP following. Benjamin JOLLEY RN                  Continued Care and Services - Admitted Since 7/9/2025    No active coordination  exists.       Expected Discharge Date and Time       Expected Discharge Date Expected Discharge Time    Jul 12, 2025            Demographic Summary       Row Name 07/11/25 1542       General Information    Admission Type inpatient    Referral Source admission list    Preferred Language English       Contact Information    Permission Granted to Share Info With                    Functional Status       Row Name 07/11/25 1542       Functional Status    Usual Activity Tolerance good    Current Activity Tolerance good                   Psychosocial    No documentation.                  Abuse/Neglect    No documentation.                  Legal    No documentation.                  Substance Abuse    No documentation.                  Patient Forms    No documentation.                     Benjamin Kaba RN

## 2025-07-11 NOTE — PLAN OF CARE
Problem: Adult Inpatient Plan of Care  Goal: Plan of Care Review  Flowsheets (Taken 7/11/2025 0690)  Outcome Evaluation: AOX4. Room air. Ad jasmin. IV abx. Medicated prn for abdominal pain. IV iron. Tolerating upgraded diet. Family at bedside, call light within reach.   Goal Outcome Evaluation:              Outcome Evaluation: AOX4. Room air. Ad jasmin. IV abx. Medicated prn for abdominal pain. IV iron. Tolerating upgraded diet. Family at bedside, call light within reach.

## 2025-07-12 VITALS
HEIGHT: 58 IN | RESPIRATION RATE: 17 BRPM | HEART RATE: 69 BPM | DIASTOLIC BLOOD PRESSURE: 79 MMHG | OXYGEN SATURATION: 95 % | TEMPERATURE: 98.8 F | WEIGHT: 126.7 LBS | BODY MASS INDEX: 26.6 KG/M2 | SYSTOLIC BLOOD PRESSURE: 170 MMHG

## 2025-07-12 LAB
ANION GAP SERPL CALCULATED.3IONS-SCNC: 9.6 MMOL/L (ref 5–15)
BUN SERPL-MCNC: 8 MG/DL (ref 8–23)
BUN/CREAT SERPL: 11.1 (ref 7–25)
CALCIUM SPEC-SCNC: 8.5 MG/DL (ref 8.6–10.5)
CHLORIDE SERPL-SCNC: 107 MMOL/L (ref 98–107)
CO2 SERPL-SCNC: 24.4 MMOL/L (ref 22–29)
CREAT SERPL-MCNC: 0.72 MG/DL (ref 0.57–1)
DEPRECATED RDW RBC AUTO: 46.9 FL (ref 37–54)
EGFRCR SERPLBLD CKD-EPI 2021: 90.6 ML/MIN/1.73
ERYTHROCYTE [DISTWIDTH] IN BLOOD BY AUTOMATED COUNT: 16.5 % (ref 12.3–15.4)
GLUCOSE BLDC GLUCOMTR-MCNC: 136 MG/DL (ref 70–130)
GLUCOSE SERPL-MCNC: 127 MG/DL (ref 65–99)
HCT VFR BLD AUTO: 26.8 % (ref 34–46.6)
HGB BLD-MCNC: 7.9 G/DL (ref 12–15.9)
MCH RBC QN AUTO: 23 PG (ref 26.6–33)
MCHC RBC AUTO-ENTMCNC: 29.5 G/DL (ref 31.5–35.7)
MCV RBC AUTO: 78.1 FL (ref 79–97)
PLATELET # BLD AUTO: 296 10*3/MM3 (ref 140–450)
PMV BLD AUTO: 9.1 FL (ref 6–12)
POTASSIUM SERPL-SCNC: 3.8 MMOL/L (ref 3.5–5.2)
RBC # BLD AUTO: 3.43 10*6/MM3 (ref 3.77–5.28)
SODIUM SERPL-SCNC: 141 MMOL/L (ref 136–145)
WBC NRBC COR # BLD AUTO: 5.21 10*3/MM3 (ref 3.4–10.8)

## 2025-07-12 PROCEDURE — 82948 REAGENT STRIP/BLOOD GLUCOSE: CPT

## 2025-07-12 PROCEDURE — 80048 BASIC METABOLIC PNL TOTAL CA: CPT | Performed by: INTERNAL MEDICINE

## 2025-07-12 PROCEDURE — 85027 COMPLETE CBC AUTOMATED: CPT | Performed by: INTERNAL MEDICINE

## 2025-07-12 RX ORDER — HYDRALAZINE HYDROCHLORIDE 25 MG/1
25 TABLET, FILM COATED ORAL 3 TIMES DAILY
Qty: 90 TABLET | Refills: 0 | Status: SHIPPED | OUTPATIENT
Start: 2025-07-12 | End: 2025-07-14 | Stop reason: ALTCHOICE

## 2025-07-12 RX ORDER — HYDROCODONE BITARTRATE AND ACETAMINOPHEN 5; 325 MG/1; MG/1
1 TABLET ORAL EVERY 4 HOURS PRN
Qty: 12 TABLET | Refills: 0 | Status: SHIPPED | OUTPATIENT
Start: 2025-07-12 | End: 2025-07-20

## 2025-07-12 RX ADMIN — ASPIRIN 81 MG: 81 TABLET, COATED ORAL at 08:26

## 2025-07-12 RX ADMIN — HYDRALAZINE HYDROCHLORIDE 25 MG: 25 TABLET ORAL at 08:26

## 2025-07-12 RX ADMIN — RANOLAZINE 500 MG: 500 TABLET, FILM COATED, EXTENDED RELEASE ORAL at 08:26

## 2025-07-12 RX ADMIN — DICLOFENAC SODIUM 4 G: 10 GEL TOPICAL at 08:29

## 2025-07-12 RX ADMIN — AMOXICILLIN AND CLAVULANATE POTASSIUM 1 TABLET: 875; 125 TABLET, COATED ORAL at 08:27

## 2025-07-12 RX ADMIN — Medication 1 TABLET: at 08:26

## 2025-07-12 RX ADMIN — PANTOPRAZOLE SODIUM 40 MG: 40 TABLET, DELAYED RELEASE ORAL at 08:26

## 2025-07-12 RX ADMIN — OXYBUTYNIN CHLORIDE 10 MG: 10 TABLET, EXTENDED RELEASE ORAL at 08:27

## 2025-07-12 NOTE — PLAN OF CARE
"Goal Outcome Evaluation:  Plan of Care Reviewed With: patient        Progress: improving  Outcome Evaluation:     A&O x4. VSS. SR on telemetry. Room air.     Abdominal pain much improved.   Scoring pain at only a \"1\".       Tolerating Reg/Soft diet.       PO Abx given.   IV iron given on prior shift.       Accuchecks.   Daily weights.         Will continue to monitor.      Possible D/c home today?                             "

## 2025-07-13 NOTE — CASE MANAGEMENT/SOCIAL WORK
Case Management Discharge Note      Final Note: home         Selected Continued Care - Discharged on 7/12/2025 Admission date: 7/9/2025 - Discharge disposition: Home or Self Care      Destination    No services have been selected for the patient.                Durable Medical Equipment    No services have been selected for the patient.                Dialysis/Infusion    No services have been selected for the patient.                Home Medical Care    No services have been selected for the patient.                Therapy    No services have been selected for the patient.                Community Resources    No services have been selected for the patient.                Community & DME    No services have been selected for the patient.                    Transportation Services  Transportation: Private Transportation  Private: Car    Final Discharge Disposition Code: 01 - home or self-care

## 2025-07-13 NOTE — DISCHARGE SUMMARY
Patient Name: Domitila Paiz  : 1955  MRN: [id number]    Date of Admission: 2025  Date of Discharge:  2025  Primary Care Physician: Melissa Billy APRN      Chief Complaint:   Abdominal Pain      Discharge Diagnoses     Active Hospital Problems    Diagnosis  POA    **Sigmoid diverticulitis [K57.32]  Yes    Paroxysmal atrial fibrillation [I48.0]  Yes    Hypertension [I10]  Yes      Resolved Hospital Problems   No resolved problems to display.        Hospital Course   This is a 69-year-old woman with a past medical history of coronary artery disease, atrial fibrillation, hypertension, type 2 diabetes who presented to hospital with left lower quadrant abdominal pain.  She underwent a CT scan that showed acute sigmoid diverticulitis with evidence of possible microperforation.  She was seen in consultation by colorectal surgery and started on IV Zosyn.  The following day her symptoms improved with decreasing severity.  She was started on regular diet which he was able to tolerate.  She was transitioned to oral antibiotics and simply discharged home with plans to follow-up with colorectal surgery in 2 weeks after discharge.    Physical Exam:     Body mass index is 26.48 kg/m².  Physical Exam    General: Alert and oriented x3, no acute distress  HEENT: Normocephalic, atraumatic  CV: Regular rate and rhythm, no murmurs rubs or gallops  Lungs: Clear to auscultation bilaterally, no crackles or wheezes  Abdomen: Soft, nontender, nondistended  Neuro: CN II-XII intact, no FND appreciated     Consultants     Consult Orders (all) (From admission, onward)       Start     Ordered    25 175  Inpatient Colorectal Surgery Consult  Once        Specialty:  Colon and Rectal Surgery  Provider:  Amparo Carrington MD    25 [id number]  [facility] (on-call MD unless specified) Details  Once,   Status:  Canceled        Specialty:  Hospitalist  Provider:  (Not yet assigned)    25 [id number]                   Procedures     Imaging Results (All)       Procedure Component Value Units Date/Time    CT Abdomen Pelvis With Contrast [[id number]] Collected: 07/09/25 1608     Updated: 07/11/25 0835    Narrative:      CT ABDOMEN AND PELVIS WITH IV CONTRAST     HISTORY: 69-year-old female with left lower quadrant pain. Hysterectomy  in the past.     TECHNIQUE: Radiation dose reduction techniques were utilized, including  automated exposure control and exposure modulation based on body size.   3 mm images were obtained through the abdomen and pelvis after the  administration of IV contrast. Compared with prior CT abdomen/pelvis  06/09/2025.     FINDINGS:  1. There is extensive acute sigmoid diverticulitis with no definite free  air, but there are 2 punctate bubbles of air within the inflammatory  change which may represent a microperforation, image 113. -There is a  very small amount of free fluid adjacently, and a smaller volume at the  dependent right paracolic gutter as well, but there is no discrete fluid  collection or abscess.   -There is no bowel ileus or obstruction.  - The extensive inflammation involves the left ovary. There is a tiny  amount of air within the vaginal canal and given the proximity to the  sigmoid diverticulitis, a colovaginal fistula is suspected.  - There is thickening of the adjacent left bladder wall, but there is no  evidence for a colovesicular fistula.     2. Liver, gallbladder, spleen, pancreas, adrenals, and kidneys appear  stable. Right adnexa appears unremarkable.     3. No evidence for pneumonia or pleural effusions at the visualized lung  bases.              This report was finalized on 7/11/2025 8:32 AM by Dr. Zeinab Ames M.D on  Workstation: [id number]               Pertinent Labs     Results from last 7 days   Lab Units 07/12/25  0616 07/11/25  0701 07/10/25  1605 07/10/25  0649 07/09/25  1250   WBC 10*3/mm3 5.21 4.92  --  8.25 14.35*   HEMOGLOBIN g/dL 7.9* 7.8* 8.1* 8.0* 10.0*  "  PLATELETS 10*3/mm3 296 273  --  264 347     Results from last 7 days   Lab Units 07/12/25  0616 07/11/25  0701 07/10/25  0649 07/09/25  1250   SODIUM mmol/L 141 142 134* 137   POTASSIUM mmol/L 3.8 3.6 3.6 3.9   CHLORIDE mmol/L 107 106 97* 101   CO2 mmol/L 24.4 25.8 25.0 24.7   BUN mg/dL 8.0 7.0* 12.0 10.0   CREATININE mg/dL 0.72 0.69 0.93 0.81   GLUCOSE mg/dL 127* 92 115* 132*   Estimated Creatinine Clearance: 59.7 mL/min (by C-G formula based on SCr of 0.72 mg/dL).  Results from last 7 days   Lab Units 07/11/25  0701 07/09/25  1250   ALBUMIN g/dL 3.3* 4.3   BILIRUBIN mg/dL 0.3 0.4   ALK PHOS U/L 106 118*   AST (SGOT) U/L 14 21   ALT (SGPT) U/L 12 20     Results from last 7 days   Lab Units 07/12/25  0616 07/11/25  0701 07/10/25  0649 07/09/25  1250   CALCIUM mg/dL 8.5* 8.5* 8.5* 9.4   ALBUMIN g/dL  --  3.3*  --  4.3   MAGNESIUM mg/dL  --  2.0  --   --    PHOSPHORUS mg/dL  --  3.4  --   --      Results from last 7 days   Lab Units 07/09/25  1250   LIPASE U/L 28             Invalid input(s): \"LDLCALC\"  Results from last 7 days   Lab Units 07/09/25  1815 07/09/25  1742   BLOODCX  No growth at 3 days No growth at 3 days       Test Results Pending at Discharge     Pending Labs       Order Current Status    Blood Culture - Blood, Arm, Left Preliminary result    Blood Culture - Blood, Arm, Right Preliminary result            Discharge Details        Discharge Medications        New Medications        Instructions Start Date   amoxicillin-clavulanate 875-125 MG per tablet  Commonly known as: AUGMENTIN   1 tablet, Oral, Every 12 Hours Scheduled      HYDROcodone-acetaminophen 5-325 MG per tablet  Commonly known as: NORCO   1 tablet, Oral, Every 4 Hours PRN             Changes to Medications        Instructions Start Date   Gemtesa 75 MG tablet  Generic drug: Vibegron  What changed:   when to take this  reasons to take this   75 mg, Oral, Daily             Continue These Medications        Instructions Start Date "   acetaminophen 500 MG tablet  Commonly known as: TYLENOL   500 mg, Oral, Every 6 Hours PRN      albuterol sulfate  (90 Base) MCG/ACT inhaler  Commonly known as: PROVENTIL HFA;VENTOLIN HFA;PROAIR HFA   2 puffs      cyclobenzaprine 5 MG tablet  Commonly known as: FLEXERIL   5 mg, Oral, 3 Times Daily PRN      Diclofenac Sodium 1 % gel gel  Commonly known as: VOLTAREN   4 g, Topical, 3 Times Daily      EPINEPHrine 0.1 % nasal solution  Commonly known as: ADRENALIN   0.5 mL, As Needed      EQ Aspirin Adult Low Dose 81 MG EC tablet  Generic drug: aspirin   81 mg, Oral, Daily      hydrALAZINE 25 MG tablet  Commonly known as: APRESOLINE   25 mg, Oral, 3 Times Daily      lidocaine 5 %  Commonly known as: LIDODERM   1 patch, Transdermal, Every 24 Hours, Remove & Discard patch within 12 hours or as directed by MD      Magnesium 250 MG capsule   250 mg, Daily      methocarbamol 750 MG tablet  Commonly known as: ROBAXIN   750 mg, Oral, 3 Times Daily      multivitamin with minerals tablet tablet   1 tablet, Daily      naloxone 4 MG/0.1ML nasal spray  Commonly known as: NARCAN   Call 911. Don't prime. [place] in 1 nostril for overdose. Repeat in 2-3 minutes in other nostril if no or minimal breathing/responsiveness.      nitroglycerin 0.4 MG SL tablet  Commonly known as: NITROSTAT   0.4 mg, Every 5 Minutes PRN      PROTONIX PO   40 mg, Daily      ranolazine 500 MG 12 hr tablet  Commonly known as: RANEXA   500 mg, 2 Times Daily      rivaroxaban 15 MG tablet  Commonly known as: Xarelto   15 mg, Oral, Daily      rizatriptan 10 MG tablet  Commonly known as: MAXALT   10 mg, Once As Needed      vitamin B-12 500 MCG tablet  Commonly known as: CYANOCOBALAMIN   500 mcg, Daily             Stop These Medications      famotidine 20 MG tablet  Commonly known as: PEPCID              Allergies   Allergen Reactions    Morphine Headache     PT WAS OVER MEDICATED WITH PCA PUMP    Nsaids Other (See Comments)     Told not to take d/t blood  thinner    Wasp Venom Anaphylaxis    Amlodipine Swelling    Lisinopril Cough    Pravastatin Irritability     Unable to sleep    Betadine [Povidone Iodine] Rash     REDNESS    Butorphanol Itching, Rash and Hives    Other Rash     Burn cream    Poison Ivy Extract Hives    Valsartan Unknown - Low Severity         Discharge Disposition:  Home or Self Care    Discharge Diet:  No active diet order      Discharge Activity:       CODE STATUS:    Code Status and Medical Interventions: CPR (Attempt to Resuscitate); Full   Ordered at: 07/09/25 0973     Code Status (Patient has no pulse and is not breathing):    CPR (Attempt to Resuscitate)     Medical Interventions (Patient has pulse or is breathing):    Full       Future Appointments   Date Time Provider Department Center   7/14/2025 11:15 AM Carlos A Vasques MD [id number]      Follow-up Information       Melissa Billy, APRN .    Specialty: Family Medicine  Contact information:  [address] [facility] [address]  [phone]                             Time Spent on Discharge:  Greater than 30 minutes      Mehdi Jorge MD  [facility]  07/13/25  17:58 EDT

## 2025-07-14 ENCOUNTER — OFFICE VISIT (OUTPATIENT)
Age: 70
End: 2025-07-14
Payer: MEDICARE

## 2025-07-14 ENCOUNTER — READMISSION MANAGEMENT (OUTPATIENT)
Dept: CALL CENTER | Facility: HOSPITAL | Age: 70
End: 2025-07-14
Payer: MEDICARE

## 2025-07-14 VITALS
WEIGHT: 124 LBS | HEIGHT: 58 IN | HEART RATE: 77 BPM | BODY MASS INDEX: 26.03 KG/M2 | SYSTOLIC BLOOD PRESSURE: 174 MMHG | DIASTOLIC BLOOD PRESSURE: 80 MMHG

## 2025-07-14 DIAGNOSIS — E78.2 MIXED HYPERLIPIDEMIA: ICD-10-CM

## 2025-07-14 DIAGNOSIS — I48.0 PAROXYSMAL ATRIAL FIBRILLATION: Primary | ICD-10-CM

## 2025-07-14 DIAGNOSIS — I25.10 CORONARY ARTERY DISEASE INVOLVING NATIVE CORONARY ARTERY OF NATIVE HEART WITHOUT ANGINA PECTORIS: ICD-10-CM

## 2025-07-14 LAB
BACTERIA SPEC AEROBE CULT: NORMAL
BACTERIA SPEC AEROBE CULT: NORMAL

## 2025-07-14 PROCEDURE — 1160F RVW MEDS BY RX/DR IN RCRD: CPT | Performed by: INTERNAL MEDICINE

## 2025-07-14 PROCEDURE — 1159F MED LIST DOCD IN RCRD: CPT | Performed by: INTERNAL MEDICINE

## 2025-07-14 PROCEDURE — 3079F DIAST BP 80-89 MM HG: CPT | Performed by: INTERNAL MEDICINE

## 2025-07-14 PROCEDURE — 3077F SYST BP >= 140 MM HG: CPT | Performed by: INTERNAL MEDICINE

## 2025-07-14 PROCEDURE — 93000 ELECTROCARDIOGRAM COMPLETE: CPT | Performed by: INTERNAL MEDICINE

## 2025-07-14 PROCEDURE — 99214 OFFICE O/P EST MOD 30 MIN: CPT | Performed by: INTERNAL MEDICINE

## 2025-07-14 RX ORDER — CHLORTHALIDONE 25 MG/1
25 TABLET ORAL DAILY
Qty: 90 TABLET | Refills: 3 | Status: SHIPPED | OUTPATIENT
Start: 2025-07-14
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
## 2025-07-14 NOTE — OUTREACH NOTE
Prep Survey      Flowsheet Row Responses   [facility] facility patient discharged from? [place]   Is LACE score < 7 ? No   Eligibility Readm Mgmt   Discharge diagnosis Sigmoid diverticulitis   Does the patient have one of the following disease processes/diagnoses(primary or secondary)? Other   Does the patient have Home health ordered? No   Is there a DME ordered? No   Prep survey completed? Yes            KEKE ALFONSO - Registered Nurse

## 2025-07-22 ENCOUNTER — READMISSION MANAGEMENT (OUTPATIENT)
Dept: CALL CENTER | Facility: HOSPITAL | Age: 70
End: 2025-07-22
Payer: MEDICARE

## 2025-07-22 NOTE — OUTREACH NOTE
Medical Week 1 Survey      Flowsheet Row Responses   [facility] patient discharged from? [place]   Does the patient have one of the following disease processes/diagnoses(primary or secondary)? Other   Week 1 attempt successful? Yes   Call start time 1234   Call end time 1236   Discharge diagnosis Sigmoid diverticulitis   Person spoke with today (if not patient) and relationship pt   Meds reviewed with patient/caregiver? Yes   Is the patient having any side effects they believe may be caused by any medication additions or changes? No   Does the patient have all medications ordered at discharge? Yes   Is the patient taking all medications as directed (includes completed medication regime)? Yes   Does the patient have a primary care provider?  Yes   Does the patient have an appointment with their PCP within 7 days of discharge? Greater than 7 days   What is preventing the patient from scheduling follow up appointments within 7 days of discharge? Earlier appointment not available   Has the patient kept scheduled appointments due by today? N/A   Psychosocial issues? No   Did the patient receive a copy of their discharge instructions? Yes   Nursing interventions Reviewed instructions with patient   What is the patient's perception of their health status since discharge? Improving   Is the patient/caregiver able to teach back signs and symptoms related to disease process for when to call PCP? Yes   Is the patient/caregiver able to teach back signs and symptoms related to disease process for when to call 911? Yes   Is the patient/caregiver able to teach back the hierarchy of who to call/visit for symptoms/problems? PCP, Specialist, Home health nurse, Urgent Care, ED, 911 Yes   If the patient is a current smoker, are they able to teach back resources for cessation? Not a smoker   Week 1 call completed? Yes   Graduated Yes   Did the patient feel the follow up calls were helpful during their recovery period? Yes   Was  the number of calls appropriate? Yes   Would this patient benefit from a Referral to [facility] Social Work? No   Is the patient interested in additional calls from an ambulatory ? No   Wrap up additional comments pt doing much better  , stated has qll appts scheduled   Call end time 1236            JENNIE SPARROW - Registered Nurse

## 2025-07-28 ENCOUNTER — OFFICE VISIT (OUTPATIENT)
Dept: SURGERY | Facility: CLINIC | Age: 70
End: 2025-07-28
Payer: MEDICARE

## 2025-07-28 VITALS
SYSTOLIC BLOOD PRESSURE: 130 MMHG | OXYGEN SATURATION: 98 % | HEIGHT: 58 IN | HEART RATE: 82 BPM | DIASTOLIC BLOOD PRESSURE: 78 MMHG | BODY MASS INDEX: 26.03 KG/M2 | WEIGHT: 124 LBS

## 2025-07-28 DIAGNOSIS — K57.33 DIVERTICULITIS LARGE INTESTINE W/O PERFORATION OR ABSCESS W/BLEEDING: Primary | ICD-10-CM

## 2025-07-28 PROCEDURE — 99214 OFFICE O/P EST MOD 30 MIN: CPT | Performed by: COLON & RECTAL SURGERY

## 2025-07-28 PROCEDURE — 3078F DIAST BP <80 MM HG: CPT | Performed by: COLON & RECTAL SURGERY

## 2025-07-28 PROCEDURE — 1160F RVW MEDS BY RX/DR IN RCRD: CPT | Performed by: COLON & RECTAL SURGERY

## 2025-07-28 PROCEDURE — 1159F MED LIST DOCD IN RCRD: CPT | Performed by: COLON & RECTAL SURGERY

## 2025-07-28 PROCEDURE — 3075F SYST BP GE 130 - 139MM HG: CPT | Performed by: COLON & RECTAL SURGERY

## 2025-07-28 RX ORDER — ACETAMINOPHEN 500 MG
1000 TABLET ORAL ONCE
OUTPATIENT
Start: 2025-07-28 | End: 2025-07-28

## 2025-07-28 RX ORDER — CHLORHEXIDINE GLUCONATE 40 MG/ML
1 SOLUTION TOPICAL 2 TIMES DAILY
Qty: 236 ML | Refills: 0 | Status: SHIPPED | OUTPATIENT
Start: 2025-07-28

## 2025-07-28 RX ORDER — ALVIMOPAN 12 MG/1
12 CAPSULE ORAL ONCE
OUTPATIENT
Start: 2025-07-28 | End: 2025-07-28

## 2025-07-28 RX ORDER — SCOPOLAMINE 1 MG/3D
1 PATCH, EXTENDED RELEASE TRANSDERMAL CONTINUOUS
OUTPATIENT
Start: 2025-07-28 | End: 2025-07-31

## 2025-07-28 RX ORDER — GABAPENTIN 100 MG/1
300 CAPSULE ORAL ONCE
OUTPATIENT
Start: 2025-07-28 | End: 2025-07-28

## 2025-07-28 RX ORDER — METRONIDAZOLE 500 MG/100ML
500 INJECTION, SOLUTION INTRAVENOUS ONCE
OUTPATIENT
Start: 2025-07-28 | End: 2025-07-28

## 2025-08-05 ENCOUNTER — TELEPHONE (OUTPATIENT)
Age: 70
End: 2025-08-05
Payer: MEDICARE

## 2025-08-07 ENCOUNTER — PRE-ADMISSION TESTING (OUTPATIENT)
Dept: PREADMISSION TESTING | Facility: HOSPITAL | Age: 70
End: 2025-08-07
Payer: MEDICARE

## 2025-08-07 VITALS
HEIGHT: 58 IN | WEIGHT: 123 LBS | RESPIRATION RATE: 20 BRPM | HEART RATE: 70 BPM | TEMPERATURE: 97.1 F | BODY MASS INDEX: 25.82 KG/M2 | OXYGEN SATURATION: 99 % | SYSTOLIC BLOOD PRESSURE: 154 MMHG | DIASTOLIC BLOOD PRESSURE: 83 MMHG

## 2025-08-07 DIAGNOSIS — K57.33 DIVERTICULITIS LARGE INTESTINE W/O PERFORATION OR ABSCESS W/BLEEDING: ICD-10-CM

## 2025-08-07 LAB
ABO GROUP BLD: NORMAL
ANION GAP SERPL CALCULATED.3IONS-SCNC: 13.2 MMOL/L (ref 5–15)
BLD GP AB SCN SERPL QL: NEGATIVE
BUN SERPL-MCNC: 14 MG/DL (ref 8–23)
BUN/CREAT SERPL: 16.7 (ref 7–25)
CALCIUM SPEC-SCNC: 9.6 MG/DL (ref 8.6–10.5)
CHLORIDE SERPL-SCNC: 101 MMOL/L (ref 98–107)
CO2 SERPL-SCNC: 27.8 MMOL/L (ref 22–29)
CREAT SERPL-MCNC: 0.84 MG/DL (ref 0.57–1)
DEPRECATED RDW RBC AUTO: 57.1 FL (ref 37–54)
EGFRCR SERPLBLD CKD-EPI 2021: 75.3 ML/MIN/1.73
ERYTHROCYTE [DISTWIDTH] IN BLOOD BY AUTOMATED COUNT: 20.2 % (ref 12.3–15.4)
GLUCOSE SERPL-MCNC: 79 MG/DL (ref 65–99)
HCT VFR BLD AUTO: 36.5 % (ref 34–46.6)
HGB BLD-MCNC: 11.2 G/DL (ref 12–15.9)
INR PPP: 1.77 (ref 0.9–1.1)
MCH RBC QN AUTO: 24.7 PG (ref 26.6–33)
MCHC RBC AUTO-ENTMCNC: 30.7 G/DL (ref 31.5–35.7)
MCV RBC AUTO: 80.4 FL (ref 79–97)
PLATELET # BLD AUTO: 286 10*3/MM3 (ref 140–450)
PMV BLD AUTO: 9.4 FL (ref 6–12)
POTASSIUM SERPL-SCNC: 4 MMOL/L (ref 3.5–5.2)
PROTHROMBIN TIME: 20.7 SECONDS (ref 11.7–14.2)
RBC # BLD AUTO: 4.54 10*6/MM3 (ref 3.77–5.28)
RH BLD: POSITIVE
SODIUM SERPL-SCNC: 142 MMOL/L (ref 136–145)
T&S EXPIRATION DATE: NORMAL
WBC NRBC COR # BLD AUTO: 5.91 10*3/MM3 (ref 3.4–10.8)

## 2025-08-07 PROCEDURE — 86901 BLOOD TYPING SEROLOGIC RH(D): CPT

## 2025-08-07 PROCEDURE — 80048 BASIC METABOLIC PNL TOTAL CA: CPT

## 2025-08-07 PROCEDURE — 86900 BLOOD TYPING SEROLOGIC ABO: CPT

## 2025-08-07 PROCEDURE — 85027 COMPLETE CBC AUTOMATED: CPT

## 2025-08-07 PROCEDURE — 36415 COLL VENOUS BLD VENIPUNCTURE: CPT

## 2025-08-07 PROCEDURE — 86850 RBC ANTIBODY SCREEN: CPT

## 2025-08-07 PROCEDURE — 85610 PROTHROMBIN TIME: CPT

## 2025-08-07 RX ORDER — EPINEPHRINE 0.3 MG/.3ML
0.3 INJECTION SUBCUTANEOUS AS NEEDED
COMMUNITY

## 2025-08-18 ENCOUNTER — TELEPHONE (OUTPATIENT)
Age: 70
End: 2025-08-18
Payer: MEDICARE

## 2025-08-20 ENCOUNTER — ANESTHESIA EVENT (OUTPATIENT)
Dept: PERIOP | Facility: HOSPITAL | Age: 70
End: 2025-08-20
Payer: MEDICARE

## 2025-08-21 ENCOUNTER — HOSPITAL ENCOUNTER (INPATIENT)
Facility: HOSPITAL | Age: 70
LOS: 2 days | Discharge: HOME | DRG: 331 | End: 2025-08-23
Attending: COLON & RECTAL SURGERY | Admitting: COLON & RECTAL SURGERY
Payer: MEDICARE

## 2025-08-21 ENCOUNTER — ANESTHESIA (OUTPATIENT)
Dept: PERIOP | Facility: HOSPITAL | Age: 70
End: 2025-08-21
Payer: MEDICARE

## 2025-08-21 PROBLEM — K57.92 DIVERTICULITIS: Status: ACTIVE | Noted: 2025-08-21

## 2025-08-21 PROCEDURE — 25010000002 FENTANYL CITRATE (PF) 50 MCG/ML SOLUTION: Performed by: NURSE ANESTHETIST, CERTIFIED REGISTERED

## 2025-08-21 PROCEDURE — 25010000002 INDOCYANINE GREEN 25 MG RECONSTITUTED SOLUTION: Performed by: NURSE ANESTHETIST, CERTIFIED REGISTERED

## 2025-08-21 PROCEDURE — 25010000002 DEXAMETHASONE SODIUM PHOSPHATE 20 MG/5ML SOLUTION: Performed by: NURSE ANESTHETIST, CERTIFIED REGISTERED

## 2025-08-21 PROCEDURE — 25010000002 SUGAMMADEX 200 MG/2ML SOLUTION: Performed by: NURSE ANESTHETIST, CERTIFIED REGISTERED

## 2025-08-21 PROCEDURE — 25010000002 LIDOCAINE 2% SOLUTION: Performed by: NURSE ANESTHETIST, CERTIFIED REGISTERED

## 2025-08-21 PROCEDURE — 25010000002 MAGNESIUM SULFATE PER 500 MG OF MAGNESIUM: Performed by: NURSE ANESTHETIST, CERTIFIED REGISTERED

## 2025-08-21 PROCEDURE — 25010000002 CEFAZOLIN PER 500 MG: Performed by: NURSE ANESTHETIST, CERTIFIED REGISTERED

## 2025-08-21 PROCEDURE — 25010000002 LIDOCAINE PER 10 MG: Performed by: NURSE ANESTHETIST, CERTIFIED REGISTERED

## 2025-08-21 PROCEDURE — 25010000002 GLYCOPYRROLATE 0.2 MG/ML SOLUTION: Performed by: NURSE ANESTHETIST, CERTIFIED REGISTERED

## 2025-08-21 PROCEDURE — 25010000002 BUPIVACAINE (PF) 0.25 % SOLUTION: Performed by: ANESTHESIOLOGY

## 2025-08-21 PROCEDURE — 25010000002 DIPHENHYDRAMINE PER 50 MG: Performed by: NURSE ANESTHETIST, CERTIFIED REGISTERED

## 2025-08-21 PROCEDURE — 25010000002 BUPIVACAINE LIPOSOME 1.3 % SUSPENSION: Performed by: ANESTHESIOLOGY

## 2025-08-21 PROCEDURE — 25810000003 LACTATED RINGERS PER 1000 ML: Performed by: NURSE ANESTHETIST, CERTIFIED REGISTERED

## 2025-08-21 PROCEDURE — 25010000002 PROPOFOL 10 MG/ML EMULSION: Performed by: NURSE ANESTHETIST, CERTIFIED REGISTERED

## 2025-08-21 RX ORDER — GLYCOPYRROLATE 0.2 MG/ML
INJECTION INTRAMUSCULAR; INTRAVENOUS AS NEEDED
Status: DISCONTINUED | OUTPATIENT
Start: 2025-08-21 | End: 2025-08-21 | Stop reason: SURG

## 2025-08-21 RX ORDER — SODIUM CHLORIDE, SODIUM LACTATE, POTASSIUM CHLORIDE, CALCIUM CHLORIDE 600; 310; 30; 20 MG/100ML; MG/100ML; MG/100ML; MG/100ML
INJECTION, SOLUTION INTRAVENOUS CONTINUOUS PRN
Status: DISCONTINUED | OUTPATIENT
Start: 2025-08-21 | End: 2025-08-21 | Stop reason: SURG

## 2025-08-21 RX ORDER — DIPHENHYDRAMINE HYDROCHLORIDE 50 MG/ML
INJECTION, SOLUTION INTRAMUSCULAR; INTRAVENOUS AS NEEDED
Status: DISCONTINUED | OUTPATIENT
Start: 2025-08-21 | End: 2025-08-21 | Stop reason: SURG

## 2025-08-21 RX ORDER — EPHEDRINE SULFATE 50 MG/ML
INJECTION INTRAVENOUS AS NEEDED
Status: DISCONTINUED | OUTPATIENT
Start: 2025-08-21 | End: 2025-08-21 | Stop reason: SURG

## 2025-08-21 RX ORDER — DEXAMETHASONE SODIUM PHOSPHATE 4 MG/ML
INJECTION, SOLUTION INTRA-ARTICULAR; INTRALESIONAL; INTRAMUSCULAR; INTRAVENOUS; SOFT TISSUE AS NEEDED
Status: DISCONTINUED | OUTPATIENT
Start: 2025-08-21 | End: 2025-08-21 | Stop reason: SURG

## 2025-08-21 RX ORDER — PROPOFOL 10 MG/ML
VIAL (ML) INTRAVENOUS AS NEEDED
Status: DISCONTINUED | OUTPATIENT
Start: 2025-08-21 | End: 2025-08-21 | Stop reason: SURG

## 2025-08-21 RX ORDER — PHENYLEPHRINE HCL IN 0.9% NACL 1 MG/10 ML
SYRINGE (ML) INTRAVENOUS AS NEEDED
Status: DISCONTINUED | OUTPATIENT
Start: 2025-08-21 | End: 2025-08-21 | Stop reason: SURG

## 2025-08-21 RX ORDER — INDOCYANINE GREEN AND WATER 25 MG
KIT INJECTION AS NEEDED
Status: DISCONTINUED | OUTPATIENT
Start: 2025-08-21 | End: 2025-08-21 | Stop reason: SURG

## 2025-08-21 RX ORDER — LIDOCAINE HYDROCHLORIDE ANHYDROUS AND DEXTROSE MONOHYDRATE 5; 400 G/100ML; MG/100ML
INJECTION, SOLUTION INTRAVENOUS CONTINUOUS PRN
Status: DISCONTINUED | OUTPATIENT
Start: 2025-08-21 | End: 2025-08-21 | Stop reason: SURG

## 2025-08-21 RX ORDER — BUPIVACAINE HYDROCHLORIDE 2.5 MG/ML
INJECTION, SOLUTION EPIDURAL; INFILTRATION; INTRACAUDAL; PERINEURAL
Status: COMPLETED | OUTPATIENT
Start: 2025-08-21 | End: 2025-08-21

## 2025-08-21 RX ORDER — LIDOCAINE HYDROCHLORIDE 20 MG/ML
INJECTION, SOLUTION INFILTRATION; PERINEURAL AS NEEDED
Status: DISCONTINUED | OUTPATIENT
Start: 2025-08-21 | End: 2025-08-21 | Stop reason: SURG

## 2025-08-21 RX ORDER — ROCURONIUM BROMIDE 10 MG/ML
INJECTION, SOLUTION INTRAVENOUS AS NEEDED
Status: DISCONTINUED | OUTPATIENT
Start: 2025-08-21 | End: 2025-08-21 | Stop reason: SURG

## 2025-08-21 RX ORDER — MAGNESIUM SULFATE HEPTAHYDRATE 500 MG/ML
INJECTION, SOLUTION INTRAMUSCULAR; INTRAVENOUS AS NEEDED
Status: DISCONTINUED | OUTPATIENT
Start: 2025-08-21 | End: 2025-08-21 | Stop reason: SURG

## 2025-08-21 RX ORDER — KETAMINE HCL IN NACL, ISO-OSM 100MG/10ML
SYRINGE (ML) INJECTION AS NEEDED
Status: DISCONTINUED | OUTPATIENT
Start: 2025-08-21 | End: 2025-08-21 | Stop reason: SURG

## 2025-08-21 RX ORDER — FENTANYL CITRATE 50 UG/ML
INJECTION, SOLUTION INTRAMUSCULAR; INTRAVENOUS AS NEEDED
Status: DISCONTINUED | OUTPATIENT
Start: 2025-08-21 | End: 2025-08-21 | Stop reason: SURG

## 2025-08-21 RX ADMIN — SODIUM CHLORIDE, POTASSIUM CHLORIDE, SODIUM LACTATE AND CALCIUM CHLORIDE: 600; 310; 30; 20 INJECTION, SOLUTION INTRAVENOUS at 06:40

## 2025-08-21 RX ADMIN — MAGNESIUM SULFATE HEPTAHYDRATE 1 G: 500 INJECTION, SOLUTION INTRAMUSCULAR; INTRAVENOUS at 08:45

## 2025-08-21 RX ADMIN — BUPIVACAINE 20 ML: 13.3 INJECTION, SUSPENSION, LIPOSOMAL INFILTRATION at 07:46

## 2025-08-21 RX ADMIN — FENTANYL CITRATE 25 MCG: 50 INJECTION, SOLUTION INTRAMUSCULAR; INTRAVENOUS at 07:40

## 2025-08-21 RX ADMIN — SUGAMMADEX 200 MG: 100 INJECTION, SOLUTION INTRAVENOUS at 10:50

## 2025-08-21 RX ADMIN — BUPIVACAINE HYDROCHLORIDE 20 ML: 2.5 INJECTION, SOLUTION EPIDURAL; INFILTRATION; INTRACAUDAL; PERINEURAL at 07:46

## 2025-08-21 RX ADMIN — DIPHENHYDRAMINE HYDROCHLORIDE 25 MG: 50 INJECTION, SOLUTION INTRAMUSCULAR; INTRAVENOUS at 09:15

## 2025-08-21 RX ADMIN — Medication 30 MG: at 07:45

## 2025-08-21 RX ADMIN — Medication 10 MG: at 09:45

## 2025-08-21 RX ADMIN — LIDOCAINE HYDROCHLORIDE 2 MG/MIN: 4 INJECTION, SOLUTION INTRAVENOUS at 07:50

## 2025-08-21 RX ADMIN — CEFAZOLIN 2 G: 2 INJECTION, POWDER, LYOPHILIZED, FOR SOLUTION INTRAVENOUS at 11:14

## 2025-08-21 RX ADMIN — Medication 100 MCG: at 08:30

## 2025-08-21 RX ADMIN — Medication 10 MG: at 08:45

## 2025-08-21 RX ADMIN — MAGNESIUM SULFATE HEPTAHYDRATE 1 G: 500 INJECTION, SOLUTION INTRAMUSCULAR; INTRAVENOUS at 07:45

## 2025-08-21 RX ADMIN — ROCURONIUM BROMIDE 20 MG: 10 INJECTION, SOLUTION INTRAVENOUS at 08:37

## 2025-08-21 RX ADMIN — DEXAMETHASONE SODIUM PHOSPHATE 6 MG: 4 INJECTION, SOLUTION INTRAMUSCULAR; INTRAVENOUS at 08:00

## 2025-08-21 RX ADMIN — PROPOFOL 130 MG: 10 INJECTION, EMULSION INTRAVENOUS at 07:30

## 2025-08-21 RX ADMIN — FENTANYL CITRATE 25 MCG: 50 INJECTION, SOLUTION INTRAMUSCULAR; INTRAVENOUS at 07:30

## 2025-08-21 RX ADMIN — Medication 100 MCG: at 10:00

## 2025-08-21 RX ADMIN — LIDOCAINE HYDROCHLORIDE 60 MG: 20 INJECTION, SOLUTION INFILTRATION; PERINEURAL at 07:30

## 2025-08-21 RX ADMIN — EPHEDRINE SULFATE 10 MG: 50 INJECTION INTRAVENOUS at 07:33

## 2025-08-21 RX ADMIN — INDOCYANINE GREEN 7.5 MG: KIT INTRAVENOUS at 09:31

## 2025-08-21 RX ADMIN — GLYCOPYRROLATE 0.2 MG: 0.2 INJECTION INTRAMUSCULAR; INTRAVENOUS at 07:54

## 2025-08-21 RX ADMIN — ROCURONIUM BROMIDE 50 MG: 10 INJECTION, SOLUTION INTRAVENOUS at 07:30

## 2025-08-22 ENCOUNTER — APPOINTMENT (OUTPATIENT)
Dept: CARDIOLOGY | Facility: HOSPITAL | Age: 70
DRG: 331 | End: 2025-08-22
Payer: MEDICARE

## 2025-08-24 ENCOUNTER — NURSE TRIAGE (OUTPATIENT)
Dept: CALL CENTER | Facility: HOSPITAL | Age: 70
End: 2025-08-24
Payer: MEDICARE

## 2025-08-25 ENCOUNTER — READMISSION MANAGEMENT (OUTPATIENT)
Dept: CALL CENTER | Facility: HOSPITAL | Age: 70
End: 2025-08-25
Payer: MEDICARE

## 2025-08-26 ENCOUNTER — TELEPHONE (OUTPATIENT)
Dept: CARDIOLOGY | Facility: HOSPITAL | Age: 70
End: 2025-08-26
Payer: MEDICARE

## 2025-08-29 ENCOUNTER — READMISSION MANAGEMENT (OUTPATIENT)
Dept: CALL CENTER | Facility: HOSPITAL | Age: 70
End: 2025-08-29
Payer: MEDICARE

## 2025-08-29 ENCOUNTER — TELEPHONE (OUTPATIENT)
Dept: SURGERY | Facility: CLINIC | Age: 70
End: 2025-08-29
Payer: MEDICARE

## (undated) DEVICE — LN SMPL CO2 SHTRM SD STREAM W/M LUER

## (undated) DEVICE — SENSR O2 OXIMAX FNGR A/ 18IN NONSTR

## (undated) DEVICE — SINGLE-USE BIOPSY FORCEPS: Brand: RADIAL JAW 4

## (undated) DEVICE — CANN O2 ETCO2 FITS ALL CONN CO2 SMPL A/ 7IN DISP LF

## (undated) DEVICE — GLIDESHEATH SLENDER STAINLESS STEEL KIT: Brand: GLIDESHEATH SLENDER

## (undated) DEVICE — KT MANIFLD CARDIAC

## (undated) DEVICE — GW EMR FIX EXCHG J STD .035 3MM 260CM

## (undated) DEVICE — KT ORCA ORCAPOD DISP STRL

## (undated) DEVICE — PK CATH CARD 40

## (undated) DEVICE — TUBING, SUCTION, 1/4" X 10', STRAIGHT: Brand: MEDLINE

## (undated) DEVICE — ADAPT CLN BIOGUARD AIR/H2O DISP

## (undated) DEVICE — RADIFOCUS OPTITORQUE ANGIOGRAPHIC CATHETER: Brand: OPTITORQUE

## (undated) DEVICE — TR BAND RADIAL ARTERY COMPRESSION DEVICE: Brand: TR BAND

## (undated) DEVICE — THE CARR-LOCKE INJECTION NEEDLE IS A SINGLE USE, DISPOSABLE, FLEXIBLE SHEATH INJECTION NEEDLE USED FOR THE INJECTION OF VARIOUS TYPES OF MEDIA THROUGH FLEXIBLE ENDOSCOPES.

## (undated) DEVICE — RADIFOCUS GLIDEWIRE: Brand: GLIDEWIRE